# Patient Record
Sex: MALE | Race: WHITE | Employment: UNEMPLOYED | URBAN - NONMETROPOLITAN AREA
[De-identification: names, ages, dates, MRNs, and addresses within clinical notes are randomized per-mention and may not be internally consistent; named-entity substitution may affect disease eponyms.]

---

## 2017-01-02 PROBLEM — G06.2 EPIDURAL ABSCESS: Status: ACTIVE | Noted: 2017-01-02

## 2017-01-02 PROBLEM — F11.90 HEROIN USE: Status: ACTIVE | Noted: 2017-01-02

## 2017-01-02 PROBLEM — Z91.14 NON COMPLIANCE W MEDICATION REGIMEN: Status: ACTIVE | Noted: 2017-01-02

## 2017-01-02 PROBLEM — Z91.148 NON COMPLIANCE W MEDICATION REGIMEN: Status: ACTIVE | Noted: 2017-01-02

## 2017-01-02 PROBLEM — I10 HYPERTENSION: Status: ACTIVE | Noted: 2017-01-02

## 2017-01-02 PROBLEM — E87.1 HYPONATREMIA: Status: ACTIVE | Noted: 2017-01-02

## 2017-01-10 ENCOUNTER — OFFICE VISIT (OUTPATIENT)
Dept: FAMILY MEDICINE CLINIC | Age: 42
End: 2017-01-10

## 2017-01-10 VITALS
HEIGHT: 74 IN | DIASTOLIC BLOOD PRESSURE: 80 MMHG | HEART RATE: 74 BPM | SYSTOLIC BLOOD PRESSURE: 126 MMHG | WEIGHT: 246 LBS | BODY MASS INDEX: 31.57 KG/M2

## 2017-01-10 DIAGNOSIS — L02.212 ABSCESS OF BACK: Primary | ICD-10-CM

## 2017-01-10 PROCEDURE — 99212 OFFICE O/P EST SF 10 MIN: CPT | Performed by: FAMILY MEDICINE

## 2017-01-10 RX ORDER — HYDROCODONE BITARTRATE AND ACETAMINOPHEN 5; 325 MG/1; MG/1
1 TABLET ORAL EVERY 6 HOURS PRN
Qty: 40 TABLET | Refills: 0 | Status: ON HOLD | OUTPATIENT
Start: 2017-01-10 | End: 2017-02-10 | Stop reason: HOSPADM

## 2017-01-10 RX ORDER — HYDROCODONE BITARTRATE AND ACETAMINOPHEN 5; 325 MG/1; MG/1
1 TABLET ORAL EVERY 6 HOURS PRN
Status: ON HOLD | COMMUNITY
End: 2017-02-10 | Stop reason: HOSPADM

## 2017-01-11 ENCOUNTER — TELEPHONE (OUTPATIENT)
Dept: FAMILY MEDICINE CLINIC | Age: 42
End: 2017-01-11

## 2017-02-13 ENCOUNTER — TELEPHONE (OUTPATIENT)
Dept: FAMILY MEDICINE CLINIC | Age: 42
End: 2017-02-13

## 2017-06-27 PROBLEM — M46.20 OSTEOMYELITIS OF SPINE (HCC): Status: ACTIVE | Noted: 2017-06-27

## 2017-06-27 PROBLEM — M46.40 DISKITIS: Status: ACTIVE | Noted: 2017-06-27

## 2017-06-27 PROBLEM — M79.652 PAIN IN LEFT THIGH: Status: ACTIVE | Noted: 2017-06-27

## 2017-07-04 PROBLEM — F11.10 OPIATE ABUSE, CONTINUOUS (HCC): Status: ACTIVE | Noted: 2017-01-02

## 2017-07-05 ENCOUNTER — TELEPHONE (OUTPATIENT)
Dept: FAMILY MEDICINE CLINIC | Age: 42
End: 2017-07-05

## 2017-07-05 PROBLEM — M46.46 DISCITIS OF LUMBAR REGION: Status: ACTIVE | Noted: 2017-06-27
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
## 2017-07-07 PROBLEM — D50.9 MICROCYTIC ANEMIA: Status: ACTIVE | Noted: 2017-07-07

## 2017-07-07 PROBLEM — F19.90 IVDU (INTRAVENOUS DRUG USER): Status: ACTIVE | Noted: 2017-07-07

## 2017-07-07 PROBLEM — R76.8 HEPATITIS C ANTIBODY TEST POSITIVE: Status: ACTIVE | Noted: 2017-07-07
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
## 2017-07-07 PROBLEM — R20.0 RIGHT LEG NUMBNESS: Status: ACTIVE | Noted: 2017-07-07

## 2017-07-28 ENCOUNTER — OFFICE VISIT (OUTPATIENT)
Dept: FAMILY MEDICINE CLINIC | Age: 42
End: 2017-07-28
Payer: MEDICAID

## 2017-07-28 VITALS
SYSTOLIC BLOOD PRESSURE: 130 MMHG | BODY MASS INDEX: 36.57 KG/M2 | WEIGHT: 270 LBS | DIASTOLIC BLOOD PRESSURE: 90 MMHG | TEMPERATURE: 98.6 F | HEART RATE: 86 BPM | HEIGHT: 72 IN

## 2017-07-28 DIAGNOSIS — G89.29 CHRONIC BILATERAL LOW BACK PAIN WITH RIGHT-SIDED SCIATICA: Primary | ICD-10-CM

## 2017-07-28 DIAGNOSIS — M54.41 CHRONIC BILATERAL LOW BACK PAIN WITH RIGHT-SIDED SCIATICA: Primary | ICD-10-CM

## 2017-07-28 DIAGNOSIS — R20.0 RIGHT LEG NUMBNESS: ICD-10-CM

## 2017-07-28 DIAGNOSIS — F11.10 OPIATE ABUSE, CONTINUOUS (HCC): ICD-10-CM

## 2017-07-28 PROCEDURE — 99213 OFFICE O/P EST LOW 20 MIN: CPT | Performed by: FAMILY MEDICINE

## 2017-09-18 ENCOUNTER — HOSPITAL ENCOUNTER (OUTPATIENT)
Dept: GENERAL RADIOLOGY | Age: 42
Discharge: HOME | End: 2017-09-18
Payer: MEDICAID

## 2017-09-18 ENCOUNTER — HOSPITAL ENCOUNTER (OUTPATIENT)
Age: 42
Discharge: HOME | End: 2017-09-18
Payer: MEDICAID

## 2017-09-18 DIAGNOSIS — Z12.5 ENCOUNTER FOR PROSTATE CANCER SCREENING: ICD-10-CM

## 2017-09-18 LAB
EKG ATRIAL RATE: 63 BPM
EKG P AXIS: 44 DEGREES
EKG P-R INTERVAL: 148 MS
EKG Q-T INTERVAL: 406 MS
EKG QRS DURATION: 98 MS
EKG QTC CALCULATION (BAZETT): 415 MS
EKG R AXIS: 66 DEGREES
EKG T AXIS: 39 DEGREES
EKG VENTRICULAR RATE: 63 BPM

## 2017-09-18 PROCEDURE — 71010 XR CHEST LIMITED: CPT

## 2017-09-18 PROCEDURE — 93005 ELECTROCARDIOGRAM TRACING: CPT

## 2017-12-06 ENCOUNTER — APPOINTMENT (OUTPATIENT)
Dept: CT IMAGING | Age: 42
End: 2017-12-06
Payer: MEDICAID

## 2017-12-06 ENCOUNTER — HOSPITAL ENCOUNTER (EMERGENCY)
Age: 42
Discharge: HOME | End: 2017-12-06
Attending: EMERGENCY MEDICINE
Payer: MEDICAID

## 2017-12-06 VITALS
DIASTOLIC BLOOD PRESSURE: 80 MMHG | HEIGHT: 74 IN | SYSTOLIC BLOOD PRESSURE: 123 MMHG | OXYGEN SATURATION: 97 % | BODY MASS INDEX: 30.8 KG/M2 | HEART RATE: 71 BPM | TEMPERATURE: 98.8 F | RESPIRATION RATE: 16 BRPM | WEIGHT: 240 LBS

## 2017-12-06 DIAGNOSIS — G89.29 ACUTE EXACERBATION OF CHRONIC LOW BACK PAIN: Primary | ICD-10-CM

## 2017-12-06 DIAGNOSIS — M54.50 ACUTE EXACERBATION OF CHRONIC LOW BACK PAIN: Primary | ICD-10-CM

## 2017-12-06 DIAGNOSIS — M43.10 ANTEROLISTHESIS: ICD-10-CM

## 2017-12-06 DIAGNOSIS — M54.16 LUMBAR BACK PAIN WITH RADICULOPATHY AFFECTING RIGHT LOWER EXTREMITY: ICD-10-CM

## 2017-12-06 LAB
ANION GAP SERPL CALCULATED.3IONS-SCNC: 15 MEQ/L (ref 8–16)
ANISOCYTOSIS: ABNORMAL
BASOPHILS # BLD: 0.8 %
BASOPHILS ABSOLUTE: 0 THOU/MM3 (ref 0–0.1)
BUN BLDV-MCNC: 9 MG/DL (ref 7–22)
C-REACTIVE PROTEIN: 0.8 MG/DL (ref 0–1)
CALCIUM SERPL-MCNC: 8.8 MG/DL (ref 8.5–10.5)
CHLORIDE BLD-SCNC: 101 MEQ/L (ref 98–111)
CO2: 25 MEQ/L (ref 23–33)
CREAT SERPL-MCNC: 0.8 MG/DL (ref 0.4–1.2)
EOSINOPHIL # BLD: 3 %
EOSINOPHILS ABSOLUTE: 0.2 THOU/MM3 (ref 0–0.4)
GFR SERPL CREATININE-BSD FRML MDRD: > 90 ML/MIN/1.73M2
GLUCOSE BLD-MCNC: 105 MG/DL (ref 70–108)
HCT VFR BLD CALC: 42.4 % (ref 42–52)
HEMOGLOBIN: 13.9 GM/DL (ref 14–18)
HYPOCHROMIA: ABNORMAL
LYMPHOCYTES # BLD: 11.6 %
LYMPHOCYTES ABSOLUTE: 0.7 THOU/MM3 (ref 1–4.8)
MCH RBC QN AUTO: 24.8 PG (ref 27–31)
MCHC RBC AUTO-ENTMCNC: 32.7 GM/DL (ref 33–37)
MCV RBC AUTO: 75.9 FL (ref 80–94)
MICROCYTES: ABNORMAL
MONOCYTES # BLD: 7.6 %
MONOCYTES ABSOLUTE: 0.4 THOU/MM3 (ref 0.4–1.3)
NUCLEATED RED BLOOD CELLS: 0 /100 WBC
OSMOLALITY CALCULATION: 280.3 MOSMOL/KG (ref 275–300)
PDW BLD-RTO: 17.1 % (ref 11.5–14.5)
PLATELET # BLD: 229 THOU/MM3 (ref 130–400)
PMV BLD AUTO: 7.8 MCM (ref 7.4–10.4)
POTASSIUM SERPL-SCNC: 4.6 MEQ/L (ref 3.5–5.2)
RBC # BLD: 5.6 MILL/MM3 (ref 4.7–6.1)
SEG NEUTROPHILS: 77 %
SEGMENTED NEUTROPHILS ABSOLUTE COUNT: 4.5 THOU/MM3 (ref 1.8–7.7)
SODIUM BLD-SCNC: 141 MEQ/L (ref 135–145)
WBC # BLD: 5.8 THOU/MM3 (ref 4.8–10.8)

## 2017-12-06 PROCEDURE — 85025 COMPLETE CBC W/AUTO DIFF WBC: CPT

## 2017-12-06 PROCEDURE — 6360000002 HC RX W HCPCS: Performed by: EMERGENCY MEDICINE

## 2017-12-06 PROCEDURE — 72132 CT LUMBAR SPINE W/DYE: CPT

## 2017-12-06 PROCEDURE — 36415 COLL VENOUS BLD VENIPUNCTURE: CPT

## 2017-12-06 PROCEDURE — 96375 TX/PRO/DX INJ NEW DRUG ADDON: CPT

## 2017-12-06 PROCEDURE — 2580000003 HC RX 258: Performed by: EMERGENCY MEDICINE

## 2017-12-06 PROCEDURE — 86140 C-REACTIVE PROTEIN: CPT

## 2017-12-06 PROCEDURE — 99284 EMERGENCY DEPT VISIT MOD MDM: CPT

## 2017-12-06 PROCEDURE — 6360000004 HC RX CONTRAST MEDICATION: Performed by: EMERGENCY MEDICINE

## 2017-12-06 PROCEDURE — 96374 THER/PROPH/DIAG INJ IV PUSH: CPT

## 2017-12-06 PROCEDURE — 80048 BASIC METABOLIC PNL TOTAL CA: CPT

## 2017-12-06 RX ORDER — TIZANIDINE 4 MG/1
TABLET ORAL
Qty: 24 TABLET | Refills: 0 | Status: SHIPPED | OUTPATIENT
Start: 2017-12-06

## 2017-12-06 RX ORDER — OXYCODONE HYDROCHLORIDE AND ACETAMINOPHEN 5; 325 MG/1; MG/1
1 TABLET ORAL EVERY 6 HOURS PRN
Qty: 15 TABLET | Refills: 0 | Status: SHIPPED | OUTPATIENT
Start: 2017-12-06

## 2017-12-06 RX ORDER — PREDNISONE 10 MG/1
TABLET ORAL
Qty: 16 TABLET | Refills: 0 | Status: SHIPPED | OUTPATIENT
Start: 2017-12-06

## 2017-12-06 RX ORDER — SODIUM CHLORIDE 9 MG/ML
INJECTION, SOLUTION INTRAVENOUS CONTINUOUS
Status: DISCONTINUED | OUTPATIENT
Start: 2017-12-06 | End: 2017-12-06 | Stop reason: HOSPADM

## 2017-12-06 RX ORDER — FENTANYL CITRATE 50 UG/ML
50 INJECTION, SOLUTION INTRAMUSCULAR; INTRAVENOUS ONCE
Status: COMPLETED | OUTPATIENT
Start: 2017-12-06 | End: 2017-12-06

## 2017-12-06 RX ORDER — DEXAMETHASONE SODIUM PHOSPHATE 4 MG/ML
8 INJECTION, SOLUTION INTRA-ARTICULAR; INTRALESIONAL; INTRAMUSCULAR; INTRAVENOUS; SOFT TISSUE ONCE
Status: COMPLETED | OUTPATIENT
Start: 2017-12-06 | End: 2017-12-06

## 2017-12-06 RX ADMIN — DEXAMETHASONE SODIUM PHOSPHATE 8 MG: 4 INJECTION, SOLUTION INTRAMUSCULAR; INTRAVENOUS at 13:42

## 2017-12-06 RX ADMIN — IOPAMIDOL 80 ML: 755 INJECTION, SOLUTION INTRAVENOUS at 13:54

## 2017-12-06 RX ADMIN — FENTANYL CITRATE 50 MCG: 50 INJECTION INTRAMUSCULAR; INTRAVENOUS at 13:36

## 2017-12-06 RX ADMIN — SODIUM CHLORIDE: 9 INJECTION, SOLUTION INTRAVENOUS at 13:38

## 2017-12-06 ASSESSMENT — PAIN DESCRIPTION - LOCATION
LOCATION: BACK

## 2017-12-06 ASSESSMENT — PAIN DESCRIPTION - PAIN TYPE
TYPE: ACUTE PAIN

## 2017-12-06 ASSESSMENT — ENCOUNTER SYMPTOMS
SORE THROAT: 0
BLOOD IN STOOL: 0
EYE REDNESS: 0
ABDOMINAL PAIN: 0
BACK PAIN: 1
EYE DISCHARGE: 0
SHORTNESS OF BREATH: 0
WHEEZING: 0
DIARRHEA: 0
NAUSEA: 0
VOMITING: 0
COUGH: 0
CONSTIPATION: 0
RHINORRHEA: 0

## 2017-12-06 ASSESSMENT — PAIN DESCRIPTION - ORIENTATION
ORIENTATION: LOWER;RIGHT
ORIENTATION: LOWER;RIGHT
ORIENTATION: RIGHT;LOWER

## 2017-12-06 ASSESSMENT — PAIN SCALES - GENERAL
PAINLEVEL_OUTOF10: 8

## 2017-12-06 ASSESSMENT — PAIN DESCRIPTION - DESCRIPTORS
DESCRIPTORS: SHARP

## 2017-12-06 ASSESSMENT — PAIN DESCRIPTION - FREQUENCY
FREQUENCY: CONTINUOUS
FREQUENCY: CONTINUOUS

## 2017-12-06 NOTE — ED TRIAGE NOTES
Patient presents to ER with complaints of lower right back pain. Patient states that he needs a referral to Dr. Edmundo Bishop.

## 2017-12-06 NOTE — ED PROVIDER NOTES
[address] [address]   eMERGENCY dEPARTMENT eNCOUnter        CHIEF COMPLAINT    Chief Complaint   Patient presents with    Back Pain     lower right    Other     needs referral to Dr. Christopher Gilliam       Nurses Notes reviewed and I agree except as noted in the HPI. HPI    Tami Carter is a 40 y.o. male who presents to the ED for evaluation of right-sided lower back pain. Patient states that he has been experiencing the pain for over 1 year. He states that he has a history of IV drug abuse following which he developed an epidural abscess. He reports that he had surgery to remove that abscess on 12/30/16 by Dr. Jimmie Alex. He reports that he then received repeat bilateral laminectomies by Dr. Christopher Gilliam on 2/17/17. Patient reports that he was being followed by Dr. Christopher Gilliam and receiving therapy until he had insurance problems. He states that he then received a lumbar laminectomy, L3-S1 decompression, and had a reema placed by Dr. Tran Read on 6/30/17. Patient reports that his right-sided lower back pain is still present, and that he called Dr. Lora Null office today where he was advised to receive a referral in order to be seen in his office. He rates his pain at an 8/10 in severity, and states that the pain is sharp and aching in quality. He states that the pain is continuous in duration, and that it radiates down his left leg. Patient denies experiencing any chest pain, shortness of breath, abdominal pain, loss of bowel or bladder control, weakness, numbness, or tingling. He states that he has only been taking over the counter medications for the pain. He denies any illicit drug abuse currently. Patient denies further complaints at initial encounter. REVIEW OF SYSTEMS    Review of Systems   Constitutional: Negative for appetite change, chills, diaphoresis, fatigue and fever. HENT: Negative for congestion, ear pain, rhinorrhea and sore throat.     Eyes: Negative for discharge, redness and body mass index is 30.81 kg/m² as calculated from the following:    Height as of this encounter: 6' 2\" (1.88 m). Weight as of this encounter: 240 lb (108.9 kg). Physical Exam   Constitutional: He is oriented to person, place, and time. He appears well-developed and well-nourished. He is active and cooperative. Non-toxic appearance. HENT:   Head: Normocephalic and atraumatic. Right Ear: External ear normal.   Left Ear: External ear normal.   Eyes: Conjunctivae and EOM are normal. Right eye exhibits no discharge. Left eye exhibits no discharge. No scleral icterus. Neck: Normal range of motion. Neck supple. No JVD present. No tracheal deviation present. Cardiovascular: Normal rate, normal heart sounds, intact distal pulses and normal pulses. Exam reveals no gallop. No murmur heard. Pulmonary/Chest: Effort normal and breath sounds normal. No accessory muscle usage or stridor. No respiratory distress. He has no decreased breath sounds. He has no wheezes. He has no rhonchi. He has no rales. Abdominal: Soft. He exhibits no distension. There is no tenderness. There is no rigidity, no rebound and no guarding. Musculoskeletal: Normal range of motion. He exhibits no edema. Right hip: Normal. He exhibits normal range of motion, normal strength, no tenderness, no swelling, no crepitus and no deformity. Thoracic back: Normal. He exhibits normal range of motion, no tenderness, no bony tenderness, no deformity, no spasm and normal pulse. Lumbar back: He exhibits tenderness (right-sided paraspinal). He exhibits normal range of motion, no swelling, no deformity and normal pulse. Neurological: He is alert and oriented to person, place, and time. He is not disoriented. No cranial nerve deficit or sensory deficit. He exhibits normal muscle tone. GCS eye subscore is 4. GCS verbal subscore is 5. GCS motor subscore is 6. Patient's sensation is intact distally in his lower extremities. Skin: Skin is warm and dry. He is not diaphoretic. No erythema. Psychiatric: He has a normal mood and affect. His speech is normal and behavior is normal.   Nursing note and vitals reviewed. MEDICAL DECISION MAKING    DIFFERENTIAL DIAGNOSIS:  Lumbar radiculopathy, chronic back pain      DIAGNOSTIC RESULTS    RADIOLOGY:  I have reviewed radiologic plain film image(s). The plain films will be read or overread by the radiologist.  All other non-plain film images(s) such as CT, Ultrasound and MRI have been read by the radiologist.  [place] [address]   Final Result   Interval change of the further collapse of the L4 endplate and anterior listhesis of L3 on 4. Active discitis or osteomyelitis is not excludable. **This report has been created using voice recognition software. It may contain minor errors which are inherent in voice recognition technology. **      Final report electronically signed by Dr. Rebecca Randall on 12/6/2017 2:30 PM          LABS:   Labs Reviewed   CBC WITH AUTO DIFFERENTIAL - Abnormal; Notable for the following:        Result Value    Hemoglobin 13.9 (*)     MCV 75.9 (*)     MCH 24.8 (*)     MCHC 32.7 (*)     RDW 17.1 (*)     Lymphocytes # 0.7 (*)     All other components within normal limits   BASIC METABOLIC PANEL   C-REACTIVE PROTEIN   ANION GAP   OSMOLALITY   GLOMERULAR FILTRATION RATE, ESTIMATED     All other unresulted laboratory test above are normal:    Vitals:    Vitals:    12/06/17 1054 12/06/17 1129 12/06/17 1253 12/06/17 1344   BP: (!) 137/91 (!) 140/96 135/87 123/80   Pulse:  92 74 71   Resp:  18 16 16   Temp:  98.8 °F (37.1 °C)     TempSrc:  Oral     SpO2:  96% 97% 97%   Weight:       Height:           EMERGENCY DEPARTMENT COURSE:    Medications   Dexamethasone Sodium Phosphate injection 8 mg (8 mg Intravenous Given 12/6/17 1342)   fentaNYL (SUBLIMAZE) injection 50 mcg (50 mcg Intravenous Given 12/6/17 1336)   iopamidol (ISOVUE-370) 76 % injection 80 mL (80 mLs Intravenous Given 12/6/17 9993)       The pt was seen and evaluated by me. Within the department, I observed the pt's vital signs to be within acceptable range except for his blood pressure. Laboratory and Radiological studies were performed, results were reviewed with the patient. Within the department, the pt was treated with sodium chloride, dexamethasone sodium phosphate, and fentanyl. I observed the pt's condition to improve during the duration of their stay. Pain improved to 2/10. I explained my proposed course of treatment to the pt, and they were amenable to my decision. Discussed the case with neurosurgery service as Dr. Paulette Abad who  accepted the case including follow-up. They were discharged home, and they will return to the ED if their symptoms become more severe in nature, or otherwise change. Controlled Substances Monitoring:   Attestation: The Prescription Monitoring Report for this patient was reviewed today. Tavo Kumar MD)  Documentation: Possible medication side effects, risk of tolerance and/or dependence, and alternative treatments discussed., No signs of potential drug abuse or diversion identified. Tavo Kumar MD)    CRITICAL CARE:   None. CONSULTS:  Dr. Alexx Wilde:  None. FINAL IMPRESSION       1. Acute exacerbation of chronic low back pain    2. Lumbar back pain with radiculopathy affecting right lower extremity    3.  Anterolisthesis L3 on L4          DISPOSITION/PLAN  PATIENT REFERRED TO:  Lauri Kelly MD  [facility]  [facility]  [address] [place] [address] [address] [place] [address]    Schedule an appointment as soon as possible for a visit in 2 days      [facility] EM[facility] DEPT  [address]    As needed, If symptoms worsen    DISCHARGE MEDICATIONS:  Discharge Medication List as of 12/6/2017  2:54 PM      START taking these medications    Details   predniSONE (DELTASONE) 10 MG tablet 2 tablets 2 times a day for 2 days then 1 Tablet 2 times a day for 2 days, then 1 tablet daily till gone, Disp-16 tablet, R-0Print      tiZANidine (ZANAFLEX) 4 MG tablet 1 tablet every 6-8 hours for muscle spasm when necessary, Disp-24 tablet, R-0Print      oxyCODONE-acetaminophen (PERCOCET) 5-325 MG per tablet Take 1 tablet by mouth every 6 hours as needed for Pain ., Disp-15 tablet, R-0Print             (Please note that portions of this note were completed with a voice recognition program.  Efforts were made to edit the dictations but occasionally words are mis-transcribed.)      Scribe:  Carrie Garcia 12/08/17 11:38 AM Scribing for and in the presence of Donell Samson M.D. Signed by: Alejandro Abraham, 12/08/17 12:24 PM    Provider:  I personally performed the services described in the documentation, reviewed and edited the documentation which was dictated to the scribe in my presence, and it accurately records my words and actions.      12/08/17 12:24 PM      Mariah Linda MD        Emergency room physician        Mariah Linda MD  12/08/17 [id number]

## 2018-09-02 ENCOUNTER — HOSPITAL ENCOUNTER (EMERGENCY)
Age: 43
Discharge: HOME | End: 2018-09-02
Payer: MEDICAID

## 2018-09-02 VITALS
SYSTOLIC BLOOD PRESSURE: 112 MMHG | OXYGEN SATURATION: 100 % | RESPIRATION RATE: 16 BRPM | DIASTOLIC BLOOD PRESSURE: 95 MMHG | HEART RATE: 99 BPM

## 2018-09-02 DIAGNOSIS — T50.904A DRUG OVERDOSE, UNDETERMINED INTENT, INITIAL ENCOUNTER: Primary | ICD-10-CM

## 2018-09-02 PROCEDURE — 99284 EMERGENCY DEPT VISIT MOD MDM: CPT

## 2018-09-02 NOTE — ED NOTES
Bed: 003A  Expected date: 9/2/18  Expected time: 2:18 PM  Means of arrival: [facility]  Comments:  Possible MARS Lerma, RN  09/02/18 [id number]

## 2018-09-02 NOTE — ED PROVIDER NOTES
2:34 PM The patient was seen in the ED following an overdose. He was alert and orientated on arrival. The patient refused any treatment, workup, or exam. Despite discussion with patient about reasons for and importance of admission and/or further testing, the patient refuses admission and/or any further testing and wants to leave against medical advice. The patient is alert and oriented times three, not altered or intoxicated in any fashion, and appears capable of making informed medical decisions. I explained plainly to the patient and any available family, the possible risks of leaving against medical advice, including worsening of medical condition that could result in their death, disability, or chronic vegetative state. They verbalize understanding to these risks and accept sole responsibility for leaving today. I did explain to them, that although they are leaving against medical advice today, they should not hesitate to return to the emergency room at any time should they change their mind, need re-evaluation or desire further care. (Please note that portions of this note were completed with a voice recognition program.  Efforts were made to edit the dictations but occasionally words are mis-transcribed.)    Scribe:  Paty Montes De Oca 9/2/18 2:35 PM Scribing for and in the presence of Margie Gutierrez CNP    Signed by: Alejandro Hernandez, 09/02/18 2:35 PM    Provider:  I personally performed the services described in the documentation, reviewed and edited the documentation which was dictated to the scribe in my presence, and it accurately records my words and actions.     Sumit Gutierrez CNP 9/2/18 2:35 PM              PRATIBHA Ortiz CNP  09/02/18 [id number]

## 2018-09-02 NOTE — ED NOTES
Pt refusing to do any further testing wants to leave and go home after he gets his ticket.   Pt leaves after signing out AMA>     Isaiah Moore RN  09/02/18 [id number]

## 2024-07-18 ENCOUNTER — APPOINTMENT (OUTPATIENT)
Dept: MRI IMAGING | Age: 49
End: 2024-07-18
Payer: COMMERCIAL

## 2024-07-18 ENCOUNTER — HOSPITAL ENCOUNTER (INPATIENT)
Age: 49
LOS: 6 days | Discharge: NURSING, REHAB OR LONG-TERM CARE | End: 2024-07-24
Attending: STUDENT IN AN ORGANIZED HEALTH CARE EDUCATION/TRAINING PROGRAM | Admitting: STUDENT IN AN ORGANIZED HEALTH CARE EDUCATION/TRAINING PROGRAM
Payer: COMMERCIAL

## 2024-07-18 DIAGNOSIS — M86.10 OTHER ACUTE OSTEOMYELITIS, UNSPECIFIED SITE (HCC): ICD-10-CM

## 2024-07-18 DIAGNOSIS — M54.9 INTRACTABLE BACK PAIN: Primary | ICD-10-CM

## 2024-07-18 PROBLEM — M86.9 OSTEOMYELITIS (HCC): Status: ACTIVE | Noted: 2024-07-18

## 2024-07-18 LAB
ALBUMIN SERPL BCG-MCNC: 3.8 G/DL (ref 3.5–5.1)
ALP SERPL-CCNC: 74 U/L (ref 38–126)
ALT SERPL W/O P-5'-P-CCNC: 9 U/L (ref 11–66)
AMPHETAMINES UR QL SCN: POSITIVE
ANION GAP SERPL CALC-SCNC: 10 MEQ/L (ref 8–16)
AST SERPL-CCNC: 13 U/L (ref 5–40)
BACTERIA: ABNORMAL
BARBITURATES UR QL SCN: NEGATIVE
BASOPHILS ABSOLUTE: 0 THOU/MM3 (ref 0–0.1)
BASOPHILS NFR BLD AUTO: 0.3 %
BENZODIAZ UR QL SCN: POSITIVE
BILIRUB SERPL-MCNC: 0.5 MG/DL (ref 0.3–1.2)
BILIRUB UR QL STRIP: NEGATIVE
BUN SERPL-MCNC: 16 MG/DL (ref 7–22)
BZE UR QL SCN: NEGATIVE
CALCIUM SERPL-MCNC: 8.4 MG/DL (ref 8.5–10.5)
CANNABINOIDS UR QL SCN: NEGATIVE
CASTS #/AREA URNS LPF: ABNORMAL /LPF
CASTS #/AREA URNS LPF: ABNORMAL /LPF
CHARACTER UR: CLEAR
CHARCOAL URNS QL MICRO: ABNORMAL
CHLORIDE SERPL-SCNC: 102 MEQ/L (ref 98–111)
CO2 SERPL-SCNC: 26 MEQ/L (ref 23–33)
COLOR UR: YELLOW
CREAT SERPL-MCNC: 0.7 MG/DL (ref 0.4–1.2)
CRP SERPL-MCNC: 3.95 MG/DL (ref 0–1)
CRYSTALS URNS QL MICRO: ABNORMAL
DEPRECATED RDW RBC AUTO: 43.9 FL (ref 35–45)
EKG ATRIAL RATE: 91 BPM
EKG P AXIS: 39 DEGREES
EKG P-R INTERVAL: 152 MS
EKG Q-T INTERVAL: 350 MS
EKG QRS DURATION: 90 MS
EKG QTC CALCULATION (BAZETT): 430 MS
EKG R AXIS: 83 DEGREES
EKG T AXIS: 61 DEGREES
EKG VENTRICULAR RATE: 91 BPM
EOSINOPHIL NFR BLD AUTO: 0.1 %
EOSINOPHILS ABSOLUTE: 0 THOU/MM3 (ref 0–0.4)
EPITHELIAL CELLS, UA: ABNORMAL /HPF
ERYTHROCYTE [DISTWIDTH] IN BLOOD BY AUTOMATED COUNT: 14.4 % (ref 11.5–14.5)
ERYTHROCYTE [SEDIMENTATION RATE] IN BLOOD BY WESTERGREN METHOD: 12 MM/HR (ref 0–10)
FENTANYL: POSITIVE
GFR SERPL CREATININE-BSD FRML MDRD: > 90 ML/MIN/1.73M2
GLUCOSE SERPL-MCNC: 114 MG/DL (ref 70–108)
GLUCOSE UR QL STRIP.AUTO: NEGATIVE MG/DL
HCT VFR BLD AUTO: 42.1 % (ref 42–52)
HGB BLD-MCNC: 13.1 GM/DL (ref 14–18)
HGB UR QL STRIP.AUTO: NEGATIVE
IMM GRANULOCYTES # BLD AUTO: 0.05 THOU/MM3 (ref 0–0.07)
IMM GRANULOCYTES NFR BLD AUTO: 0.4 %
KETONES UR QL STRIP.AUTO: ABNORMAL
LEUKOCYTE ESTERASE UR QL STRIP.AUTO: NEGATIVE
LYMPHOCYTES ABSOLUTE: 1.6 THOU/MM3 (ref 1–4.8)
LYMPHOCYTES NFR BLD AUTO: 12.9 %
MCH RBC QN AUTO: 26.3 PG (ref 26–33)
MCHC RBC AUTO-ENTMCNC: 31.1 GM/DL (ref 32.2–35.5)
MCV RBC AUTO: 84.5 FL (ref 80–94)
MONOCYTES ABSOLUTE: 1 THOU/MM3 (ref 0.4–1.3)
MONOCYTES NFR BLD AUTO: 8.3 %
NEUTROPHILS ABSOLUTE: 9.6 THOU/MM3 (ref 1.8–7.7)
NEUTROPHILS NFR BLD AUTO: 78 %
NITRITE UR QL STRIP.AUTO: NEGATIVE
NRBC BLD AUTO-RTO: 0 /100 WBC
OPIATES UR QL SCN: NEGATIVE
OXYCODONE: NEGATIVE
PCP UR QL SCN: NEGATIVE
PH UR STRIP.AUTO: 5.5 [PH] (ref 5–9)
PLATELET # BLD AUTO: 224 THOU/MM3 (ref 130–400)
PMV BLD AUTO: 9.7 FL (ref 9.4–12.4)
POTASSIUM SERPL-SCNC: 3.9 MEQ/L (ref 3.5–5.2)
PROT SERPL-MCNC: 6.8 G/DL (ref 6.1–8)
PROT UR STRIP.AUTO-MCNC: ABNORMAL MG/DL
RBC # BLD AUTO: 4.98 MILL/MM3 (ref 4.7–6.1)
RBC #/AREA URNS HPF: ABNORMAL /HPF
RENAL EPI CELLS #/AREA URNS HPF: ABNORMAL /[HPF]
SODIUM SERPL-SCNC: 138 MEQ/L (ref 135–145)
SP GR UR REFRACT.AUTO: > 1.03 (ref 1–1.03)
UROBILINOGEN UR QL STRIP.AUTO: 1 EU/DL (ref 0–1)
WBC # BLD AUTO: 12.3 THOU/MM3 (ref 4.8–10.8)
WBC #/AREA URNS HPF: ABNORMAL /HPF
YEAST LIKE FUNGI URNS QL MICRO: ABNORMAL

## 2024-07-18 PROCEDURE — 93010 ELECTROCARDIOGRAM REPORT: CPT | Performed by: INTERNAL MEDICINE

## 2024-07-18 PROCEDURE — 36415 COLL VENOUS BLD VENIPUNCTURE: CPT

## 2024-07-18 PROCEDURE — 81001 URINALYSIS AUTO W/SCOPE: CPT

## 2024-07-18 PROCEDURE — 96374 THER/PROPH/DIAG INJ IV PUSH: CPT

## 2024-07-18 PROCEDURE — 2580000003 HC RX 258

## 2024-07-18 PROCEDURE — 85025 COMPLETE CBC W/AUTO DIFF WBC: CPT

## 2024-07-18 PROCEDURE — 87040 BLOOD CULTURE FOR BACTERIA: CPT

## 2024-07-18 PROCEDURE — 86140 C-REACTIVE PROTEIN: CPT

## 2024-07-18 PROCEDURE — 1200000000 HC SEMI PRIVATE

## 2024-07-18 PROCEDURE — 80307 DRUG TEST PRSMV CHEM ANLYZR: CPT

## 2024-07-18 PROCEDURE — 72158 MRI LUMBAR SPINE W/O & W/DYE: CPT

## 2024-07-18 PROCEDURE — 93005 ELECTROCARDIOGRAM TRACING: CPT | Performed by: STUDENT IN AN ORGANIZED HEALTH CARE EDUCATION/TRAINING PROGRAM

## 2024-07-18 PROCEDURE — 80053 COMPREHEN METABOLIC PANEL: CPT

## 2024-07-18 PROCEDURE — 6360000002 HC RX W HCPCS

## 2024-07-18 PROCEDURE — A9579 GAD-BASE MR CONTRAST NOS,1ML: HCPCS | Performed by: PHYSICIAN ASSISTANT

## 2024-07-18 PROCEDURE — 99223 1ST HOSP IP/OBS HIGH 75: CPT | Performed by: STUDENT IN AN ORGANIZED HEALTH CARE EDUCATION/TRAINING PROGRAM

## 2024-07-18 PROCEDURE — 6360000002 HC RX W HCPCS: Performed by: PHYSICIAN ASSISTANT

## 2024-07-18 PROCEDURE — 85651 RBC SED RATE NONAUTOMATED: CPT

## 2024-07-18 PROCEDURE — 6360000004 HC RX CONTRAST MEDICATION: Performed by: PHYSICIAN ASSISTANT

## 2024-07-18 PROCEDURE — 6370000000 HC RX 637 (ALT 250 FOR IP)

## 2024-07-18 PROCEDURE — 87641 MR-STAPH DNA AMP PROBE: CPT

## 2024-07-18 PROCEDURE — 99285 EMERGENCY DEPT VISIT HI MDM: CPT

## 2024-07-18 PROCEDURE — 96375 TX/PRO/DX INJ NEW DRUG ADDON: CPT

## 2024-07-18 RX ORDER — ORPHENADRINE CITRATE 30 MG/ML
60 INJECTION INTRAMUSCULAR; INTRAVENOUS ONCE
Status: COMPLETED | OUTPATIENT
Start: 2024-07-18 | End: 2024-07-18

## 2024-07-18 RX ORDER — ACETAMINOPHEN 325 MG/1
650 TABLET ORAL EVERY 6 HOURS PRN
Status: DISCONTINUED | OUTPATIENT
Start: 2024-07-18 | End: 2024-07-24 | Stop reason: HOSPADM

## 2024-07-18 RX ORDER — ACETAMINOPHEN 650 MG/1
650 SUPPOSITORY RECTAL EVERY 6 HOURS PRN
Status: DISCONTINUED | OUTPATIENT
Start: 2024-07-18 | End: 2024-07-24 | Stop reason: HOSPADM

## 2024-07-18 RX ORDER — SODIUM CHLORIDE 9 MG/ML
INJECTION, SOLUTION INTRAVENOUS PRN
Status: DISCONTINUED | OUTPATIENT
Start: 2024-07-18 | End: 2024-07-24 | Stop reason: SDUPTHER

## 2024-07-18 RX ORDER — ENOXAPARIN SODIUM 100 MG/ML
30 INJECTION SUBCUTANEOUS 2 TIMES DAILY
Status: DISCONTINUED | OUTPATIENT
Start: 2024-07-19 | End: 2024-07-24 | Stop reason: HOSPADM

## 2024-07-18 RX ORDER — ONDANSETRON 4 MG/1
4 TABLET, ORALLY DISINTEGRATING ORAL EVERY 8 HOURS PRN
Status: DISCONTINUED | OUTPATIENT
Start: 2024-07-18 | End: 2024-07-24 | Stop reason: HOSPADM

## 2024-07-18 RX ORDER — SODIUM CHLORIDE 0.9 % (FLUSH) 0.9 %
5-40 SYRINGE (ML) INJECTION EVERY 12 HOURS SCHEDULED
Status: DISCONTINUED | OUTPATIENT
Start: 2024-07-18 | End: 2024-07-24 | Stop reason: HOSPADM

## 2024-07-18 RX ORDER — SODIUM CHLORIDE 0.9 % (FLUSH) 0.9 %
5-40 SYRINGE (ML) INJECTION PRN
Status: DISCONTINUED | OUTPATIENT
Start: 2024-07-18 | End: 2024-07-24 | Stop reason: HOSPADM

## 2024-07-18 RX ORDER — LIDOCAINE 4 G/G
1 PATCH TOPICAL DAILY
Status: DISCONTINUED | OUTPATIENT
Start: 2024-07-18 | End: 2024-07-22

## 2024-07-18 RX ORDER — MORPHINE SULFATE 4 MG/ML
4 INJECTION, SOLUTION INTRAMUSCULAR; INTRAVENOUS ONCE
Status: COMPLETED | OUTPATIENT
Start: 2024-07-18 | End: 2024-07-18

## 2024-07-18 RX ORDER — ONDANSETRON 2 MG/ML
4 INJECTION INTRAMUSCULAR; INTRAVENOUS EVERY 6 HOURS PRN
Status: DISCONTINUED | OUTPATIENT
Start: 2024-07-18 | End: 2024-07-24 | Stop reason: HOSPADM

## 2024-07-18 RX ORDER — POTASSIUM CHLORIDE 20 MEQ/1
40 TABLET, EXTENDED RELEASE ORAL PRN
Status: DISCONTINUED | OUTPATIENT
Start: 2024-07-18 | End: 2024-07-24 | Stop reason: HOSPADM

## 2024-07-18 RX ORDER — KETOROLAC TROMETHAMINE 30 MG/ML
30 INJECTION, SOLUTION INTRAMUSCULAR; INTRAVENOUS ONCE
Status: COMPLETED | OUTPATIENT
Start: 2024-07-18 | End: 2024-07-18

## 2024-07-18 RX ORDER — POTASSIUM CHLORIDE 7.45 MG/ML
10 INJECTION INTRAVENOUS PRN
Status: DISCONTINUED | OUTPATIENT
Start: 2024-07-18 | End: 2024-07-24 | Stop reason: HOSPADM

## 2024-07-18 RX ORDER — MORPHINE SULFATE 4 MG/ML
4 INJECTION, SOLUTION INTRAMUSCULAR; INTRAVENOUS
Status: DISCONTINUED | OUTPATIENT
Start: 2024-07-18 | End: 2024-07-22

## 2024-07-18 RX ORDER — MAGNESIUM SULFATE IN WATER 40 MG/ML
2000 INJECTION, SOLUTION INTRAVENOUS PRN
Status: DISCONTINUED | OUTPATIENT
Start: 2024-07-18 | End: 2024-07-24 | Stop reason: HOSPADM

## 2024-07-18 RX ORDER — LORAZEPAM 2 MG/ML
1 INJECTION INTRAMUSCULAR EVERY 6 HOURS PRN
Status: DISCONTINUED | OUTPATIENT
Start: 2024-07-18 | End: 2024-07-24 | Stop reason: HOSPADM

## 2024-07-18 RX ORDER — POLYETHYLENE GLYCOL 3350 17 G/17G
17 POWDER, FOR SOLUTION ORAL DAILY PRN
Status: DISCONTINUED | OUTPATIENT
Start: 2024-07-18 | End: 2024-07-24 | Stop reason: HOSPADM

## 2024-07-18 RX ORDER — MORPHINE SULFATE 2 MG/ML
2 INJECTION, SOLUTION INTRAMUSCULAR; INTRAVENOUS
Status: DISCONTINUED | OUTPATIENT
Start: 2024-07-18 | End: 2024-07-22

## 2024-07-18 RX ORDER — TIZANIDINE 4 MG/1
4 TABLET ORAL EVERY 6 HOURS PRN
Status: DISCONTINUED | OUTPATIENT
Start: 2024-07-18 | End: 2024-07-18

## 2024-07-18 RX ADMIN — MORPHINE SULFATE 4 MG: 4 INJECTION, SOLUTION INTRAMUSCULAR; INTRAVENOUS at 20:15

## 2024-07-18 RX ADMIN — ORPHENADRINE CITRATE 60 MG: 60 INJECTION INTRAMUSCULAR; INTRAVENOUS at 09:31

## 2024-07-18 RX ADMIN — KETOROLAC TROMETHAMINE 30 MG: 30 INJECTION, SOLUTION INTRAMUSCULAR at 09:29

## 2024-07-18 RX ADMIN — MORPHINE SULFATE 4 MG: 4 INJECTION, SOLUTION INTRAMUSCULAR; INTRAVENOUS at 18:08

## 2024-07-18 RX ADMIN — SODIUM CHLORIDE, PRESERVATIVE FREE 10 ML: 5 INJECTION INTRAVENOUS at 21:59

## 2024-07-18 RX ADMIN — VANCOMYCIN HYDROCHLORIDE 2500 MG: 5 INJECTION, POWDER, LYOPHILIZED, FOR SOLUTION INTRAVENOUS at 22:39

## 2024-07-18 RX ADMIN — MORPHINE SULFATE 4 MG: 4 INJECTION, SOLUTION INTRAMUSCULAR; INTRAVENOUS at 15:13

## 2024-07-18 RX ADMIN — LORAZEPAM 1 MG: 2 INJECTION INTRAMUSCULAR; INTRAVENOUS at 20:07

## 2024-07-18 RX ADMIN — GADOTERIDOL 20 ML: 279.3 INJECTION, SOLUTION INTRAVENOUS at 13:01

## 2024-07-18 RX ADMIN — CEFTRIAXONE SODIUM 1000 MG: 1 INJECTION, POWDER, FOR SOLUTION INTRAMUSCULAR; INTRAVENOUS at 21:57

## 2024-07-18 RX ADMIN — ACETAMINOPHEN 650 MG: 325 TABLET ORAL at 23:35

## 2024-07-18 RX ADMIN — MORPHINE SULFATE 4 MG: 4 INJECTION, SOLUTION INTRAMUSCULAR; INTRAVENOUS at 23:22

## 2024-07-18 ASSESSMENT — PAIN SCALES - GENERAL
PAINLEVEL_OUTOF10: 10
PAINLEVEL_OUTOF10: 9
PAINLEVEL_OUTOF10: 10
PAINLEVEL_OUTOF10: 10
PAINLEVEL_OUTOF10: 5
PAINLEVEL_OUTOF10: 10

## 2024-07-18 ASSESSMENT — PAIN DESCRIPTION - ORIENTATION
ORIENTATION: RIGHT;LEFT
ORIENTATION: MID
ORIENTATION: RIGHT
ORIENTATION: LOWER

## 2024-07-18 ASSESSMENT — PAIN - FUNCTIONAL ASSESSMENT
PAIN_FUNCTIONAL_ASSESSMENT: NONE - DENIES PAIN
PAIN_FUNCTIONAL_ASSESSMENT: 0-10
PAIN_FUNCTIONAL_ASSESSMENT: 0-10
PAIN_FUNCTIONAL_ASSESSMENT: PREVENTS OR INTERFERES SOME ACTIVE ACTIVITIES AND ADLS

## 2024-07-18 ASSESSMENT — PAIN DESCRIPTION - DESCRIPTORS
DESCRIPTORS: ACHING

## 2024-07-18 ASSESSMENT — PAIN DESCRIPTION - LOCATION
LOCATION: BACK
LOCATION: BACK;GROIN
LOCATION: BACK

## 2024-07-18 ASSESSMENT — PAIN DESCRIPTION - PAIN TYPE: TYPE: ACUTE PAIN

## 2024-07-18 ASSESSMENT — PAIN DESCRIPTION - FREQUENCY: FREQUENCY: CONTINUOUS

## 2024-07-18 ASSESSMENT — PAIN DESCRIPTION - ONSET: ONSET: ON-GOING

## 2024-07-18 NOTE — ED NOTES
Pt resting in cot. RN reminded pt that we still need a urine sample from him. Pt verbalized understanding. Urinal is at bedside for pt to use when needed.

## 2024-07-18 NOTE — ED NOTES
Pt's sister Rachel called and was given an update on pt's status. Rachel's number is [phone].

## 2024-07-18 NOTE — ED NOTES
Pt resting in cot with eyes closed, respirations even and unlabored. RN informed pt that we still need a urine sample. RN turned on lights and gave pt a urinal to obtain urine sample at this time.

## 2024-07-18 NOTE — ED NOTES
RN medicated pt per MAR and gave pt a lunch box at this time. Pt is resting in cot with eyes closed, respirations even and unlabored. Call light is within reach.

## 2024-07-18 NOTE — ED NOTES
Pt is resting in cot with eyes closed, respirations even and unlabored. No distress noted. Call light is within reach.

## 2024-07-18 NOTE — ED NOTES
ED to inpatient nurses report      Chief Complaint:  Chief Complaint   Patient presents with    Back Pain    Groin Pain     Present to ED from: home    MOA:     LOC: alert and orientated to name, place, date  Mobility: Independent  Oxygen Baseline: room air     Current needs required: room air      Code Status:   Prior    What abnormal results were found and what did you give/do to treat them? NA> pain medications  Any procedures or intervention occur? NA    Mental Status:  Level of Consciousness: Alert (0)    Psych Assessment:        Vitals:  Patient Vitals for the past 24 hrs:   BP Temp Temp src Pulse Resp SpO2 Height Weight   07/18/24 1601 -- -- -- 80 17 97 % -- --   07/18/24 1507 (!) 141/86 -- Oral 83 18 98 % -- --   07/18/24 1309 124/79 -- -- 86 18 96 % -- --   07/18/24 1134 119/65 -- -- 83 18 98 % -- --   07/18/24 1041 (!) 107/48 -- -- 99 15 97 % -- --   07/18/24 0952 (!) 125/109 -- -- 86 18 97 % -- --   07/18/24 0902 137/87 97.6 °F (36.4 °C) Oral 95 18 100 % 1.88 m (6' 2\") 104.3 kg (230 lb)        LDAs:   Peripheral IV 07/18/24 Left (Active)   Site Assessment Clean, dry & intact 07/18/24 1054   Line Status Blood return noted;Flushed 07/18/24 1054   Dressing Status Clean, dry & intact 07/18/24 1054       Ambulatory Status:  No data recorded    Diagnosis:  DISPOSITION Admitted 07/18/2024 04:02:58 PM   Final diagnoses:   Intractable back pain        Consults:  IP CONSULT TO ORTHOPEDIC SURGERY     Pain Score:  Pain Assessment  Pain Assessment: None - Denies Pain  Pain Level: 9  Patient's Stated Pain Goal: 5  Pain Location: Back  Pain Orientation: Right    C-SSRS:   Risk of Suicide: No Risk    Sepsis Screening:       [place] Fall Risk:       Swallow Screening        Preferred Language:   English      ALLERGIES     Patient has no known allergies.    SURGICAL HISTORY       Past Surgical History:   Procedure Laterality Date    BACK SURGERY  02/17/2017    L5-S1 bilateral repeat laminectomies with resection of epidural

## 2024-07-18 NOTE — ED PROVIDER NOTES
[facility] ORTHOPEDICS 7K      EMERGENCY MEDICINE     Pt Name: Jonathan Goldsmith  MRN: [id number]  Birthdate 1975  Date of evaluation: 7/18/2024  Provider: EJ Johnson    CHIEF COMPLAINT       Chief Complaint   Patient presents with    Back Pain    Groin Pain     HISTORY OF PRESENT ILLNESS   Jonathan Goldsmith is a pleasant 49 y.o. male who presents to the emergency department for evaluation of lower back pain and groin pain. He states his back pain is chronic and midline, and that he has a history of back surgeries. His groin pain began a few days ago and he describes it as right-sided, constant, sharp, and radiating down his R testicle and R leg. He notes R leg numbness and tingling but he is still able to feel the tips of his toes. The back and groin pains are not worsened with movement and is not relieved with use of Motrin. He denies recent injury as well as pain in his L leg. He also denies fevers, chills, nausea, headaches, urinary retention, incontinence, hematuria, and hematochezia.    Patient has a history of IV drug abuse as well as history of osteomyelitis of the spine.  Patient had a recent visit to [facility] and had a CT scan of the lumbar spine.    PASTMEDICAL HISTORY     Past Medical History:   Diagnosis Date    Anemia     Discitis of lumbar region 6/27/2017    Epidural abscess     IVDU (intravenous drug user)     Osteomyelitis of spine (HCC)  multilevels  from t11 to l5 6/27/2017       Patient Active Problem List   Diagnosis Code    Epidural abscess  drained G06.2    Opiate abuse, continuous (HCC) F11.10    Hypertension I10    Non compliance w medication regimen  left AMA yesterday from 4A Z91.148    Postoperative pain G89.18    Hyponatremia E87.1    Discitis of lumbar region M46.46    Osteomyelitis of spine (HCC)  multilevels  from t11 to l5 M46.20    Pain in left thigh M79.652    Subcutaneous nodules R22.9    Injection site reaction T80.90XA    Tobacco chew use Z72.0    Right leg numbness R20.0    magnesium sulfate 2000 mg in 50 mL IVPB premix (has no administration in time range)   enoxaparin Sodium (LOVENOX) injection 30 mg (has no administration in time range)   ondansetron (ZOFRAN-ODT) disintegrating tablet 4 mg (has no administration in time range)     Or   ondansetron (ZOFRAN) injection 4 mg (has no administration in time range)   polyethylene glycol (GLYCOLAX) packet 17 g (has no administration in time range)   acetaminophen (TYLENOL) tablet 650 mg (has no administration in time range)     Or   acetaminophen (TYLENOL) suppository 650 mg (has no administration in time range)   cefTRIAXone (ROCEPHIN) 1,000 mg in sodium chloride 0.9 % 50 mL IVPB (mini-bag) (has no administration in time range)   lidocaine 4 % external patch 1 patch (1 patch TransDERmal Patch Applied 7/18/24 2007)   morphine (PF) injection 2 mg ( IntraVENous See Alternative 7/18/24 2015)     Or   morphine (PF) injection 4 mg (4 mg IntraVENous Given 7/18/24 2015)   vancomycin (VANCOCIN) intermittent dosing (placeholder) (has no administration in time range)   vancomycin (VANCOCIN) 2500 mg in sodium chloride 0.9 % 500 mL IVPB (has no administration in time range)     Followed by   vancomycin (VANCOCIN) 1750 mg in sodium chloride 0.9 % 500 mL IVPB (has no administration in time range)   orphenadrine (NORFLEX) injection 60 mg (60 mg IntraMUSCular Given 7/18/24 0931)   ketorolac (TORADOL) injection 30 mg (30 mg IntraMUSCular Given 7/18/24 0929)   gadoteridol (PROHANCE) injection 20 mL (20 mLs IntraVENous Given 7/18/24 1301)   morphine (PF) injection 4 mg (4 mg IntraVENous Given 7/18/24 1513)         PROCEDURES: (None if blank)  Procedures:     CRITICAL CARE: (None if blank)      DISCHARGE PRESCRIPTIONS: (None if blank)  Current Discharge Medication List          FINAL IMPRESSION      1. Intractable back pain          DISPOSITION/PLAN   DISPOSITION Admitted 07/18/2024 04:33:37 PM      OUTPATIENT FOLLOW UP THE PATIENT:  No follow-up provider

## 2024-07-18 NOTE — H&P
History & Physical  Internal Medicine Resident         Patient: Jonathan Goldsmith 49 y.o. male      : 1975  Date of Admission: 2024  Date of Service: Pt seen/examined on 24 and Admitted to Inpatient with expected LOS greater than two midnights due to medical therapy.       ASSESSMENT AND PLAN  Suspected osteomyelitis: Patient has history of lumbar laminectomy of L5-S1 in 2016.  Patient had epidural abscess and discitis in 2017.  Patient had lumbar decompression, I&D in 2017.  Patient has had osteomyelitis of the spine in the past. MRI of the lumbar spine showed abnormal appearance at L2-L3 level.  There is edema and enhancement at this level.  Possible edema extending into the left psoas muscle. Infection at the L2-3 level is also a consideration.  Repeat MRI of the lumbar spine w/wo contrast in 48 hours may be beneficial.  Patient having lumbar back pain radiating down right leg today  Start IV ceftriaxone, and IV vancomycin  Morphine as needed for pain  Blood cultures x 2 ordered  Daily CBC, BMP  MRSA screen ordered  Infectious disease consulted, appreciate recommendations  Hx of IV drug use: Patient has history of IV drug abuse.  States he used drugs 2 days ago  Urine drug screen ordered  Hypertension: Patient has diagnosis of hypertension, denies use of any hypertensive medications.  Had 1 reading of elevated BP at 141/86 in ED today  We will continue to monitor and make changes as necessary    Data reviewed (unless otherwise discussed in assessment/plan)  EKG:  I have reviewed the EKG with the following interpretation: Normal sinus rhythm  Normal ECG  When compared with ECG of 18-SEP-2017 07:43,  No significant change was found  Imaging: I have reviewed MRI lumbar spine with the following interpretation: 1. Abnormal appearance to the L2-L3 level. This is the level above the fusion.  There is edema and enhancement. There is possible edema extending into the left  psoas muscle. The postcontrast

## 2024-07-18 NOTE — ED NOTES
Spoke to Santosh on 7K who approved patient transfer. Patient transported upstairs in stable condition

## 2024-07-18 NOTE — ED TRIAGE NOTES
Pt presents to the ED from home with complaints of back pain and groin pain. Pt states this has been going on for the past 3 days. States he was seen at [facility] and they discharged him. Pt states the pain goes down his right groin area/leg. Pt rates pain a 10/10. States he is an IV drug user.

## 2024-07-19 PROBLEM — M54.9 INTRACTABLE BACK PAIN: Status: ACTIVE | Noted: 2024-07-19

## 2024-07-19 LAB
ANION GAP SERPL CALC-SCNC: 8 MEQ/L (ref 8–16)
BASOPHILS ABSOLUTE: 0 THOU/MM3 (ref 0–0.1)
BASOPHILS NFR BLD AUTO: 0.2 %
BUN SERPL-MCNC: 17 MG/DL (ref 7–22)
CALCIUM SERPL-MCNC: 8.1 MG/DL (ref 8.5–10.5)
CHLORIDE SERPL-SCNC: 100 MEQ/L (ref 98–111)
CO2 SERPL-SCNC: 26 MEQ/L (ref 23–33)
CREAT SERPL-MCNC: 0.7 MG/DL (ref 0.4–1.2)
DEPRECATED RDW RBC AUTO: 44.1 FL (ref 35–45)
EOSINOPHIL NFR BLD AUTO: 0.2 %
EOSINOPHILS ABSOLUTE: 0 THOU/MM3 (ref 0–0.4)
ERYTHROCYTE [DISTWIDTH] IN BLOOD BY AUTOMATED COUNT: 14.2 % (ref 11.5–14.5)
GFR SERPL CREATININE-BSD FRML MDRD: > 90 ML/MIN/1.73M2
GLUCOSE SERPL-MCNC: 87 MG/DL (ref 70–108)
HCT VFR BLD AUTO: 42.4 % (ref 42–52)
HGB BLD-MCNC: 13.4 GM/DL (ref 14–18)
IMM GRANULOCYTES # BLD AUTO: 0.03 THOU/MM3 (ref 0–0.07)
IMM GRANULOCYTES NFR BLD AUTO: 0.2 %
LYMPHOCYTES ABSOLUTE: 2 THOU/MM3 (ref 1–4.8)
LYMPHOCYTES NFR BLD AUTO: 15.9 %
MCH RBC QN AUTO: 27 PG (ref 26–33)
MCHC RBC AUTO-ENTMCNC: 31.6 GM/DL (ref 32.2–35.5)
MCV RBC AUTO: 85.3 FL (ref 80–94)
MONOCYTES ABSOLUTE: 1.3 THOU/MM3 (ref 0.4–1.3)
MONOCYTES NFR BLD AUTO: 10.2 %
MRSA DNA SPEC QL NAA+PROBE: NEGATIVE
NEUTROPHILS ABSOLUTE: 9.2 THOU/MM3 (ref 1.8–7.7)
NEUTROPHILS NFR BLD AUTO: 73.3 %
NRBC BLD AUTO-RTO: 0 /100 WBC
OSMOLALITY SERPL CALC.SUM OF ELEC: 269.1 MOSMOL/KG (ref 275–300)
PLATELET # BLD AUTO: 196 THOU/MM3 (ref 130–400)
PMV BLD AUTO: 9.9 FL (ref 9.4–12.4)
POTASSIUM SERPL-SCNC: 4.1 MEQ/L (ref 3.5–5.2)
RBC # BLD AUTO: 4.97 MILL/MM3 (ref 4.7–6.1)
SODIUM SERPL-SCNC: 134 MEQ/L (ref 135–145)
WBC # BLD AUTO: 12.5 THOU/MM3 (ref 4.8–10.8)

## 2024-07-19 PROCEDURE — 1200000000 HC SEMI PRIVATE

## 2024-07-19 PROCEDURE — 36415 COLL VENOUS BLD VENIPUNCTURE: CPT

## 2024-07-19 PROCEDURE — 6360000002 HC RX W HCPCS

## 2024-07-19 PROCEDURE — 99233 SBSQ HOSP IP/OBS HIGH 50: CPT | Performed by: STUDENT IN AN ORGANIZED HEALTH CARE EDUCATION/TRAINING PROGRAM

## 2024-07-19 PROCEDURE — 80048 BASIC METABOLIC PNL TOTAL CA: CPT

## 2024-07-19 PROCEDURE — 6370000000 HC RX 637 (ALT 250 FOR IP)

## 2024-07-19 PROCEDURE — 85025 COMPLETE CBC W/AUTO DIFF WBC: CPT

## 2024-07-19 PROCEDURE — 2580000003 HC RX 258

## 2024-07-19 RX ORDER — HYDROCODONE BITARTRATE AND ACETAMINOPHEN 5; 325 MG/1; MG/1
1 TABLET ORAL EVERY 6 HOURS PRN
Status: DISCONTINUED | OUTPATIENT
Start: 2024-07-19 | End: 2024-07-20

## 2024-07-19 RX ADMIN — MORPHINE SULFATE 4 MG: 4 INJECTION, SOLUTION INTRAMUSCULAR; INTRAVENOUS at 07:20

## 2024-07-19 RX ADMIN — SODIUM CHLORIDE, PRESERVATIVE FREE 10 ML: 5 INJECTION INTRAVENOUS at 09:42

## 2024-07-19 RX ADMIN — MORPHINE SULFATE 4 MG: 4 INJECTION, SOLUTION INTRAMUSCULAR; INTRAVENOUS at 02:18

## 2024-07-19 RX ADMIN — SODIUM CHLORIDE, PRESERVATIVE FREE 10 ML: 5 INJECTION INTRAVENOUS at 21:35

## 2024-07-19 RX ADMIN — MORPHINE SULFATE 4 MG: 4 INJECTION, SOLUTION INTRAMUSCULAR; INTRAVENOUS at 23:15

## 2024-07-19 RX ADMIN — ACETAMINOPHEN 650 MG: 325 TABLET ORAL at 12:56

## 2024-07-19 RX ADMIN — VANCOMYCIN HYDROCHLORIDE 1750 MG: 5 INJECTION, POWDER, LYOPHILIZED, FOR SOLUTION INTRAVENOUS at 10:35

## 2024-07-19 RX ADMIN — CEFTRIAXONE SODIUM 1000 MG: 1 INJECTION, POWDER, FOR SOLUTION INTRAMUSCULAR; INTRAVENOUS at 09:45

## 2024-07-19 RX ADMIN — VANCOMYCIN HYDROCHLORIDE 1750 MG: 5 INJECTION, POWDER, LYOPHILIZED, FOR SOLUTION INTRAVENOUS at 21:34

## 2024-07-19 RX ADMIN — MORPHINE SULFATE 4 MG: 4 INJECTION, SOLUTION INTRAMUSCULAR; INTRAVENOUS at 09:38

## 2024-07-19 RX ADMIN — MORPHINE SULFATE 4 MG: 4 INJECTION, SOLUTION INTRAMUSCULAR; INTRAVENOUS at 12:56

## 2024-07-19 RX ADMIN — ENOXAPARIN SODIUM 30 MG: 100 INJECTION SUBCUTANEOUS at 09:43

## 2024-07-19 RX ADMIN — MORPHINE SULFATE 4 MG: 4 INJECTION, SOLUTION INTRAMUSCULAR; INTRAVENOUS at 19:18

## 2024-07-19 RX ADMIN — MORPHINE SULFATE 4 MG: 4 INJECTION, SOLUTION INTRAMUSCULAR; INTRAVENOUS at 16:34

## 2024-07-19 RX ADMIN — HYDROCODONE BITARTRATE AND ACETAMINOPHEN 1 TABLET: 5; 325 TABLET ORAL at 20:46

## 2024-07-19 RX ADMIN — CEFTRIAXONE SODIUM 1000 MG: 1 INJECTION, POWDER, FOR SOLUTION INTRAMUSCULAR; INTRAVENOUS at 20:46

## 2024-07-19 ASSESSMENT — PAIN SCALES - GENERAL
PAINLEVEL_OUTOF10: 10
PAINLEVEL_OUTOF10: 8
PAINLEVEL_OUTOF10: 10

## 2024-07-19 ASSESSMENT — PAIN DESCRIPTION - DESCRIPTORS
DESCRIPTORS: ACHING

## 2024-07-19 ASSESSMENT — PAIN DESCRIPTION - LOCATION
LOCATION: BACK

## 2024-07-19 ASSESSMENT — PAIN DESCRIPTION - ORIENTATION
ORIENTATION: MID
ORIENTATION: LOWER

## 2024-07-19 ASSESSMENT — PAIN - FUNCTIONAL ASSESSMENT
PAIN_FUNCTIONAL_ASSESSMENT: PREVENTS OR INTERFERES SOME ACTIVE ACTIVITIES AND ADLS
PAIN_FUNCTIONAL_ASSESSMENT: ACTIVITIES ARE NOT PREVENTED
PAIN_FUNCTIONAL_ASSESSMENT: PREVENTS OR INTERFERES SOME ACTIVE ACTIVITIES AND ADLS
PAIN_FUNCTIONAL_ASSESSMENT: ACTIVITIES ARE NOT PREVENTED

## 2024-07-19 NOTE — PROGRESS NOTES
Hospitalist Progress Note  Internal Medicine Resident      Patient: Jonathan Goldsmith 49 y.o. male      Unit/Bed: K-23/023-A    Admit Date: 7/18/2024      ASSESSMENT AND PLAN  Active Problems  Suspected osteomyelitis: Patient has history of lumbar laminectomy of L5-S1 in 2016.  Patient had epidural abscess and discitis in 2017.  Patient had lumbar decompression, I&D in 2017.  Patient has had osteomyelitis of the spine in the past. MRI of the lumbar spine showed abnormal appearance at L2-L3 level.  There is edema and enhancement at this level.  Possible edema extending into the left psoas muscle. Infection at the L2-3 level is also a consideration.  Repeat MRI of the lumbar spine w/wo contrast in 48 hours may be beneficial.  Patient having lumbar back pain radiating down right leg today.  Patient had 1 fever episode overnight at 101.9, resolved with Tylenol  Continue IV ceftriaxone, and IV vancomycin  Morphine as needed for pain  Tylenol as needed for fevers  Blood cultures x 2 ordered, pending  Daily CBC, BMP  MRSA screen negative  Infectious disease consulted, appreciate recommendations  Hx of IV drug use: Patient has history of IV drug abuse.  States he used drugs 2 days ago  Urine drug screen positive for methamphetamine, fentanyl, benzodiazepines  Hypertension: Patient has diagnosis of hypertension, denies use of any hypertensive medications.  Had 1 reading of elevated BP at 141/86 in ED 7/18/24.  Blood pressure stable today at 133/62  We will continue to monitor and make changes as necessary      LDA: []CVC / []PICC / []Midline / []Marquez / []Drains / []Mediport / []None  Antibiotics: Ceftriaxone, vancomycin  Steroids: None  Labs (still needed?): [x]Yes / []No  IVF (still needed?): [x]Yes / []No    Level of care: []Step Down / [x]Med-Surg  Bed Status: [x]Inpatient / []Observation  Telemetry: []Yes / [x]No  PT/OT: [x]Yes / []No    DVT Prophylaxis: [] Lovenox / [] Heparin / [] SCDs / [] Already on Systemic  provider] enoxaparin  30 mg SubCUTAneous BID    cefTRIAXone (ROCEPHIN) IV  1,000 mg IntraVENous Q12H    lidocaine  1 patch TransDERmal Daily    vancomycin (VANCOCIN) intermittent dosing (placeholder)   Other RX Placeholder    vancomycin  1,750 mg IntraVENous Q12H    PRN Meds: LORazepam, sodium chloride flush, sodium chloride, potassium chloride **OR** potassium alternative oral replacement **OR** potassium chloride, magnesium sulfate, ondansetron **OR** ondansetron, polyethylene glycol, acetaminophen **OR** acetaminophen, morphine **OR** morphine    Exam:  /62   Pulse 95   Temp 99 °F (37.2 °C) (Oral)   Resp 16   Ht 1.88 m (6' 2\")   Wt 104.3 kg (230 lb)   SpO2 100%   BMI 29.53 kg/m²   General: In pain, appears stated age.  Eyes:  PERRL. Conjunctivae/corneas clear.  HENT: Head normal appearing. Nares normal. Oral mucosa moist.  Hearing intact.   Neck: Supple, with full range of motion. Trachea midline.  No gross JVD appreciated.  Respiratory:  Normal effort. Clear to auscultation, without rales or wheezes or rhonchi.  Cardiovascular: Normal rate, regular rhythm with normal S1/S2 without murmurs.    No lower extremity edema.   Abdomen: Soft, non-tender, non-distended with normal bowel sounds.  Musculoskeletal: No joint swelling or tenderness. Normal tone. No abnormal movements.   Skin: Warm and dry. No rashes or lesions.  Neurologic: Pain in bilateral lower extremities, test limited  Psychiatric: Alert and oriented, normal insight and thought content.   Peripheral Pulses: +2 palpable, equal bilaterally.       Labs/Radiology: See chart or assessment above.     Electronically signed by Kyler Dockery DO on 7/19/2024 at 6:30 PM  Case was discussed with Attending, Dr. Glasgow.

## 2024-07-19 NOTE — PLAN OF CARE
Problem: Discharge Planning  Goal: Discharge to home or other facility with appropriate resources  Outcome: Progressing  Flowsheets (Taken 7/19/2024 1709)  Discharge to home or other facility with appropriate resources: Identify barriers to discharge with patient and caregiver     Problem: Pain  Goal: Verbalizes/displays adequate comfort level or baseline comfort level  Outcome: Progressing  Flowsheets (Taken 7/19/2024 1709)  Verbalizes/displays adequate comfort level or baseline comfort level: Encourage patient to monitor pain and request assistance     Problem: Safety - Adult  Goal: Free from fall injury  Outcome: Progressing  Flowsheets (Taken 7/19/2024 1709)  Free From Fall Injury: Instruct family/caregiver on patient safety     Problem: Skin/Tissue Integrity - Adult  Goal: Skin integrity remains intact  Outcome: Progressing  Flowsheets (Taken 7/19/2024 1709)  Skin Integrity Remains Intact: Monitor for areas of redness and/or skin breakdown     Problem: Musculoskeletal - Adult  Goal: Return mobility to safest level of function  Outcome: Progressing  Flowsheets (Taken 7/19/2024 1709)  Return Mobility to Safest Level of Function: Assess patient stability and activity tolerance for standing, transferring and ambulating with or without assistive devices     Problem: Metabolic/Fluid and Electrolytes - Adult  Goal: Electrolytes maintained within normal limits  Outcome: Progressing  Flowsheets (Taken 7/19/2024 1709)  Electrolytes maintained within normal limits: Monitor labs and assess patient for signs and symptoms of electrolyte imbalances     Problem: Hematologic - Adult  Goal: Maintains hematologic stability  Outcome: Progressing  Flowsheets (Taken 7/19/2024 1709)  Maintains hematologic stability: Assess for signs and symptoms of bleeding or hemorrhage   Care plan reviewed with patient.  Patient verbalize understanding of the plan of care and contribute to goal setting.

## 2024-07-19 NOTE — CARE COORDINATION
follow POC.            07/19/24 1444   Service Assessment   Patient Orientation Alert and Oriented   Cognition Alert   History Provided By Patient   Primary Caregiver Self   Accompanied By/Relationship unaccomp.   Support Systems Friends/Neighbors   Patient's Healthcare Decision Maker is: Legal Next of Kin  (sister Rachel)   PCP Verified by CM Yes   Last Visit to PCP Within last two years   Prior Functional Level Independent in ADLs/IADLs   Current Functional Level Assistance with the following:;Bathing;Housework;Shopping;Mobility   Can patient return to prior living arrangement Unknown at present   Ability to make needs known: Good   Family able to assist with home care needs: Yes  (sister can)   Would you like for me to discuss the discharge plan with any other family members/significant others, and if so, who? No   Financial Resources Medicaid   Community Resources None   CM/SW Referral DME   Social/Functional History   Lives With Alone   Type of Home House   Home Layout One level   Active  No   Patient's  Info sister can provide rides, lives in [place]   Discharge Planning   Type of Residence House   Living Arrangements Alone   Current Services Prior To Admission Durable Medical Equipment   Current DME Prior to Arrival Cane;Bedside Commode;Walker  (has RW)   Potential Assistance Needed N/A   DME Ordered? No   Potential Assistance Purchasing Medications No   Type of Home Care Services None   Patient expects to be discharged to: House   Follow Up Appointment: Best Day/Time  Tuesday PM   One/Two Story Residence One story   History of falls? 0   Services At/After Discharge   Transition of Care Consult (CM Consult) Home Health;Discharge Planning   Services At/After Discharge Home Health;PT;OT;Nursing services  (patient unsure at this time, TBD)   Confirm Follow Up Transport Family   Condition of Participation: Discharge Planning   The Plan for Transition of Care is related to the following treatment  goals: better pain control, take a few steps

## 2024-07-19 NOTE — PROGRESS NOTES
[facility]   Pharmacy Pharmacokinetic Monitoring Service - Vancomycin     Jonathan Goldsmith is a 49 y.o. male starting on vancomycin therapy for Bone and Joint Infection. Pharmacy consulted by Dr. Dockery for monitoring and adjustment.    Target Concentration: Goal AUC/-600 mg*hr/L    Additional Antimicrobials: Rocephin    Pertinent Laboratory Values:   Wt Readings from Last 1 Encounters:   07/18/24 104.3 kg (230 lb)     Temp Readings from Last 1 Encounters:   07/18/24 99.5 °F (37.5 °C) (Oral)     Estimated Creatinine Clearance: 166 mL/min (based on SCr of 0.7 mg/dL).  Recent Labs     07/18/24  0940 07/18/24  0941   CREATININE  --  0.7   BUN  --  16   WBC 12.3*  --      Pertinent Cultures:  Date Source Results   7/18/24 Blood Sent   MRSA Nasal Swab: N/A. Non-respiratory infection.    Plan:  Dosing recommendations based on Bayesian software  Start Vancomycin 2500 mg IVPB x 1 loading dose followed by Vancomycin 1750 mg IVPB Q12H  Anticipated AUC of 494 and trough concentration of 12.3 mcg/mL at steady state  Renal labs as indicated   Vancomycin concentration to be ordered within 48 hours   Pharmacy will continue to monitor patient and adjust therapy as indicated    Thank you for the consult,    Haresh Burgess, TrinyD, BCPS  7/18/2024  8:19 PM

## 2024-07-19 NOTE — PROGRESS NOTES
Spoke with sister, she provided HIPAA code. Updated on plan for patient and how patient was doing. No questions or concerns at this time.

## 2024-07-19 NOTE — CONSULTS
CONSULTATION NOTE :ID       Patient - Jonathan Goldsmith,  Age - 49 y.o.    - 1975      Room Number - 7K-23/023-A   MRN -  [id number]   [id number]  Date of Admission -  2024  9:01 AM  Patient's PCP: Lan Davenport MD     Requesting Physician: Adrian Glasgow MD    REASON FOR CONSULTATION   Osteomyelitis  CHIEF COMPLAINT   Back and groin pain    HISTORY OF PRESENT ILLNESS     This is a very pleasant 49 y.o. male who was admitted to the hospital with a chief complaints of back and groin pain for 3 day duration also associated with subjective fever. PMHx HTN, HLD, IV drug use last used about a week ago . Reports 10/10 pain in back and groin starting around 3-4 days ago, with radiation down the right leg. Originally presented to outside facility and was discharged with naproxen, prednisone, flexeril for pain control with minimal effectiveness. Reports surgical history significant for lumbar laminectomy of L5-S1 in 2016 complicated with epidural abscess and discitis in 2017 requiring lumbar decompression and I&D in 2017 at that time tissue biopsy growing Serratia marcescens.     MRI of the spine showed abnormal appearance at L2-L3 level.  There is edema and enhancement at this level.  Possible edema extending into the left psoas muscle.  Infection at the L2-3 level is also a consideration. Moderate severity bilateral foraminal stenosis at the L3-4 level. Recommendations from radiology for repeat imaging 48 hours from presentation. He was started on IV vanc and rocephin for suspected osteomyelitis. Blood cultures x2 pending, MRAS negative.     Overnight fever peaking at 101.6    PAST MEDICAL  HISTORY       Past Medical History:   Diagnosis Date    Anemia     Discitis of lumbar region 2017    Epidural abscess     IVDU (intravenous drug user)     Osteomyelitis of spine (HCC)  multilevels  from t11 to l5 2017       PAST SURGICAL HISTORY     Past Surgical History:

## 2024-07-19 NOTE — PROGRESS NOTES
Pt admitted to  7K23 in a wheelchair.     Complaints: Back pain.     IV site free of s/s of infection or infiltration. Vital signs obtained. Assessment and data collection initiated.     Two nurse skin assessment performed by Marina RN and Reji RN. Oriented to room.     Explained patients right to have family, representative or physician notified of their admission.  Patient has Declined for physician to be notified.  Patient has Declined for family/representative to be notified.    The patient is interested in [facility] meds to beds program?:  No    Policies and procedures for  explained. All questions answered with no further questions at this time. Fall prevention and safety brochure discussed with patient.  Bed alarm on. Call light in reach.

## 2024-07-19 NOTE — PLAN OF CARE
Problem: Discharge Planning  Goal: Discharge to home or other facility with appropriate resources  Outcome: Progressing  Flowsheets (Taken 7/18/2024 2256)  Discharge to home or other facility with appropriate resources:   Identify barriers to discharge with patient and caregiver   Identify discharge learning needs (meds, wound care, etc)     Problem: Pain  Goal: Verbalizes/displays adequate comfort level or baseline comfort level  Outcome: Progressing  Flowsheets (Taken 7/18/2024 2256)  Verbalizes/displays adequate comfort level or baseline comfort level:   Encourage patient to monitor pain and request assistance   Administer analgesics based on type and severity of pain and evaluate response   Consider cultural and social influences on pain and pain management   Assess pain using appropriate pain scale   Implement non-pharmacological measures as appropriate and evaluate response     Problem: Safety - Adult  Goal: Free from fall injury  Outcome: Progressing  Flowsheets (Taken 7/18/2024 2256)  Free From Fall Injury: Instruct family/caregiver on patient safety  Care plan reviewed with patient.  Patient verbalize understanding of the plan of care and contribute to goal setting.

## 2024-07-19 NOTE — PROGRESS NOTES
1552 Pt scheduled for 7-22-24 at 0930. Pt to be NPO at 0500 and hold Lovenox for 24 hours prior to procedure. Call IR at  with any questions.

## 2024-07-20 ENCOUNTER — APPOINTMENT (OUTPATIENT)
Dept: ULTRASOUND IMAGING | Age: 49
End: 2024-07-20
Payer: COMMERCIAL

## 2024-07-20 ENCOUNTER — APPOINTMENT (OUTPATIENT)
Dept: CT IMAGING | Age: 49
End: 2024-07-20
Payer: COMMERCIAL

## 2024-07-20 LAB
ANION GAP SERPL CALC-SCNC: 9 MEQ/L (ref 8–16)
BASOPHILS ABSOLUTE: 0 THOU/MM3 (ref 0–0.1)
BASOPHILS NFR BLD AUTO: 0.3 %
BUN SERPL-MCNC: 15 MG/DL (ref 7–22)
CALCIUM SERPL-MCNC: 8.1 MG/DL (ref 8.5–10.5)
CHLORIDE SERPL-SCNC: 99 MEQ/L (ref 98–111)
CK SERPL-CCNC: 159 U/L (ref 55–170)
CO2 SERPL-SCNC: 26 MEQ/L (ref 23–33)
CREAT SERPL-MCNC: 0.7 MG/DL (ref 0.4–1.2)
DEPRECATED RDW RBC AUTO: 42.1 FL (ref 35–45)
EOSINOPHIL NFR BLD AUTO: 0.5 %
EOSINOPHILS ABSOLUTE: 0.1 THOU/MM3 (ref 0–0.4)
ERYTHROCYTE [DISTWIDTH] IN BLOOD BY AUTOMATED COUNT: 13.8 % (ref 11.5–14.5)
GFR SERPL CREATININE-BSD FRML MDRD: > 90 ML/MIN/1.73M2
GLUCOSE SERPL-MCNC: 94 MG/DL (ref 70–108)
HCT VFR BLD AUTO: 40.3 % (ref 42–52)
HGB BLD-MCNC: 12.9 GM/DL (ref 14–18)
IMM GRANULOCYTES # BLD AUTO: 0.07 THOU/MM3 (ref 0–0.07)
IMM GRANULOCYTES NFR BLD AUTO: 0.5 %
LACTATE SERPL-SCNC: 1.1 MMOL/L (ref 0.5–2)
LYMPHOCYTES ABSOLUTE: 1.7 THOU/MM3 (ref 1–4.8)
LYMPHOCYTES NFR BLD AUTO: 13.3 %
MCH RBC QN AUTO: 26.8 PG (ref 26–33)
MCHC RBC AUTO-ENTMCNC: 32 GM/DL (ref 32.2–35.5)
MCV RBC AUTO: 83.6 FL (ref 80–94)
MONOCYTES ABSOLUTE: 1.3 THOU/MM3 (ref 0.4–1.3)
MONOCYTES NFR BLD AUTO: 10.1 %
NEUTROPHILS ABSOLUTE: 9.9 THOU/MM3 (ref 1.8–7.7)
NEUTROPHILS NFR BLD AUTO: 75.3 %
NRBC BLD AUTO-RTO: 0 /100 WBC
PLATELET # BLD AUTO: 184 THOU/MM3 (ref 130–400)
PMV BLD AUTO: 9.5 FL (ref 9.4–12.4)
POTASSIUM SERPL-SCNC: 4.3 MEQ/L (ref 3.5–5.2)
RBC # BLD AUTO: 4.82 MILL/MM3 (ref 4.7–6.1)
SODIUM SERPL-SCNC: 134 MEQ/L (ref 135–145)
VANCOMYCIN SERPL-MCNC: 11.3 UG/ML (ref 0.1–39.9)
WBC # BLD AUTO: 13.1 THOU/MM3 (ref 4.8–10.8)

## 2024-07-20 PROCEDURE — 2580000003 HC RX 258

## 2024-07-20 PROCEDURE — 36415 COLL VENOUS BLD VENIPUNCTURE: CPT

## 2024-07-20 PROCEDURE — 6370000000 HC RX 637 (ALT 250 FOR IP)

## 2024-07-20 PROCEDURE — 80202 ASSAY OF VANCOMYCIN: CPT

## 2024-07-20 PROCEDURE — 72131 CT LUMBAR SPINE W/O DYE: CPT

## 2024-07-20 PROCEDURE — 82550 ASSAY OF CK (CPK): CPT

## 2024-07-20 PROCEDURE — 6360000002 HC RX W HCPCS

## 2024-07-20 PROCEDURE — 1200000000 HC SEMI PRIVATE

## 2024-07-20 PROCEDURE — 99233 SBSQ HOSP IP/OBS HIGH 50: CPT | Performed by: STUDENT IN AN ORGANIZED HEALTH CARE EDUCATION/TRAINING PROGRAM

## 2024-07-20 PROCEDURE — 83605 ASSAY OF LACTIC ACID: CPT

## 2024-07-20 PROCEDURE — 80048 BASIC METABOLIC PNL TOTAL CA: CPT

## 2024-07-20 PROCEDURE — 6370000000 HC RX 637 (ALT 250 FOR IP): Performed by: PHYSICIAN ASSISTANT

## 2024-07-20 PROCEDURE — 85025 COMPLETE CBC W/AUTO DIFF WBC: CPT

## 2024-07-20 PROCEDURE — 76870 US EXAM SCROTUM: CPT

## 2024-07-20 RX ORDER — GABAPENTIN 100 MG/1
100 CAPSULE ORAL 3 TIMES DAILY
Status: DISCONTINUED | OUTPATIENT
Start: 2024-07-20 | End: 2024-07-22

## 2024-07-20 RX ORDER — HYDROCODONE BITARTRATE AND ACETAMINOPHEN 5; 325 MG/1; MG/1
1 TABLET ORAL EVERY 4 HOURS PRN
Status: DISCONTINUED | OUTPATIENT
Start: 2024-07-20 | End: 2024-07-22

## 2024-07-20 RX ORDER — HYDROCODONE BITARTRATE AND ACETAMINOPHEN 5; 325 MG/1; MG/1
2 TABLET ORAL EVERY 4 HOURS PRN
Status: DISCONTINUED | OUTPATIENT
Start: 2024-07-20 | End: 2024-07-22

## 2024-07-20 RX ORDER — CYCLOBENZAPRINE HCL 10 MG
10 TABLET ORAL 3 TIMES DAILY PRN
Status: DISCONTINUED | OUTPATIENT
Start: 2024-07-20 | End: 2024-07-24 | Stop reason: HOSPADM

## 2024-07-20 RX ADMIN — HYDROCODONE BITARTRATE AND ACETAMINOPHEN 1 TABLET: 5; 325 TABLET ORAL at 01:21

## 2024-07-20 RX ADMIN — Medication 1250 MG: at 23:43

## 2024-07-20 RX ADMIN — GABAPENTIN 100 MG: 100 CAPSULE ORAL at 21:32

## 2024-07-20 RX ADMIN — SODIUM CHLORIDE, PRESERVATIVE FREE 10 ML: 5 INJECTION INTRAVENOUS at 08:07

## 2024-07-20 RX ADMIN — MORPHINE SULFATE 4 MG: 4 INJECTION, SOLUTION INTRAMUSCULAR; INTRAVENOUS at 21:27

## 2024-07-20 RX ADMIN — MORPHINE SULFATE 4 MG: 4 INJECTION, SOLUTION INTRAMUSCULAR; INTRAVENOUS at 08:00

## 2024-07-20 RX ADMIN — Medication 1250 MG: at 11:17

## 2024-07-20 RX ADMIN — CEFTRIAXONE SODIUM 1000 MG: 1 INJECTION, POWDER, FOR SOLUTION INTRAMUSCULAR; INTRAVENOUS at 10:11

## 2024-07-20 RX ADMIN — MORPHINE SULFATE 4 MG: 4 INJECTION, SOLUTION INTRAMUSCULAR; INTRAVENOUS at 03:40

## 2024-07-20 RX ADMIN — HYDROCODONE BITARTRATE AND ACETAMINOPHEN 2 TABLET: 5; 325 TABLET ORAL at 20:02

## 2024-07-20 RX ADMIN — MORPHINE SULFATE 4 MG: 4 INJECTION, SOLUTION INTRAMUSCULAR; INTRAVENOUS at 18:06

## 2024-07-20 RX ADMIN — CYCLOBENZAPRINE 10 MG: 10 TABLET, FILM COATED ORAL at 16:26

## 2024-07-20 RX ADMIN — HYDROCODONE BITARTRATE AND ACETAMINOPHEN 2 TABLET: 5; 325 TABLET ORAL at 13:50

## 2024-07-20 RX ADMIN — MORPHINE SULFATE 4 MG: 4 INJECTION, SOLUTION INTRAMUSCULAR; INTRAVENOUS at 23:44

## 2024-07-20 RX ADMIN — Medication 1250 MG: at 16:25

## 2024-07-20 RX ADMIN — CEFTRIAXONE SODIUM 1000 MG: 1 INJECTION, POWDER, FOR SOLUTION INTRAMUSCULAR; INTRAVENOUS at 21:31

## 2024-07-20 RX ADMIN — HYDROCODONE BITARTRATE AND ACETAMINOPHEN 2 TABLET: 5; 325 TABLET ORAL at 09:24

## 2024-07-20 ASSESSMENT — PAIN DESCRIPTION - ORIENTATION
ORIENTATION: LOWER;RIGHT;MID
ORIENTATION: MID;LOWER
ORIENTATION: RIGHT;MID;LOWER
ORIENTATION: LOWER
ORIENTATION: RIGHT
ORIENTATION: RIGHT;LOWER;MID
ORIENTATION: LOWER
ORIENTATION: RIGHT;MID;LOWER

## 2024-07-20 ASSESSMENT — PAIN DESCRIPTION - DESCRIPTORS
DESCRIPTORS: BURNING
DESCRIPTORS: ACHING
DESCRIPTORS: ACHING
DESCRIPTORS: ACHING;SORE
DESCRIPTORS: ACHING;SHARP

## 2024-07-20 ASSESSMENT — PAIN DESCRIPTION - LOCATION
LOCATION: BACK;LEG;PENIS
LOCATION: BACK
LOCATION: BACK;LEG
LOCATION: BACK
LOCATION: BACK;GROIN;LEG

## 2024-07-20 ASSESSMENT — PAIN SCALES - GENERAL
PAINLEVEL_OUTOF10: 8
PAINLEVEL_OUTOF10: 10
PAINLEVEL_OUTOF10: 10
PAINLEVEL_OUTOF10: 9
PAINLEVEL_OUTOF10: 10
PAINLEVEL_OUTOF10: 10
PAINLEVEL_OUTOF10: 9
PAINLEVEL_OUTOF10: 10
PAINLEVEL_OUTOF10: 8
PAINLEVEL_OUTOF10: 8

## 2024-07-20 ASSESSMENT — PAIN DESCRIPTION - ONSET: ONSET: ON-GOING

## 2024-07-20 ASSESSMENT — PAIN DESCRIPTION - PAIN TYPE: TYPE: ACUTE PAIN

## 2024-07-20 ASSESSMENT — PAIN DESCRIPTION - FREQUENCY: FREQUENCY: CONTINUOUS

## 2024-07-20 NOTE — PROGRESS NOTES
[facility]   Pharmacy Pharmacokinetic Monitoring Service - Vancomycin    Indication: bone and joint infection  Target Concentration: Goal AUC/-600 mg*hr/L  Day of Therapy: 3  Additional Antimicrobials: ceftriaxone    Pertinent Laboratory Values:   Wt Readings from Last 1 Encounters:   07/18/24 104.3 kg (230 lb)     Temp Readings from Last 1 Encounters:   07/20/24 98.4 °F (36.9 °C) (Oral)     Estimated Creatinine Clearance: 166 mL/min (based on SCr of 0.7 mg/dL).  Recent Labs     07/19/24  0605 07/20/24  0409   CREATININE 0.7 0.7   BUN 17 15   WBC 12.5* 13.1*     Pertinent Cultures:  Date Source Results   07/18/24 Blood x 2 ngtd   MRSA Nasal Swab: N/A. Non-respiratory infection.    Recent vancomycin administrations                     vancomycin (VANCOCIN) 1750 mg in sodium chloride 0.9 % 500 mL IVPB (mg) 1,750 mg New Bag 07/19/24 2134     1,750 mg New Bag  1035    vancomycin (VANCOCIN) 2500 mg in sodium chloride 0.9 % 500 mL IVPB (mg) 2,500 mg New Bag 07/18/24 2239                    Assessment:  Date/Time Current Dose Concentration Timing of Concentration (h) AUC C trough,ss   07/20/24 1750 mg IV Q12H 11.3 6 h 35 min 354 7.8   Note: Serum concentrations collected for AUC dosing may appear elevated if collected in close proximity to the dose administered, this is not necessarily an indication of toxicity    Plan:  Current dosing regimen is sub-therapeutic  Increase dose to 1500 mg IV Q8H for a predicted AUC of 446 and trough of 12.9  Repeat vancomycin concentration ordered for tomorrow AM  Pharmacy will continue to monitor patient and adjust therapy as indicated    Thank you for the consult,    Janet Dominique, PharmD, BCPS  7/20/2024  7:15 AM

## 2024-07-20 NOTE — PROGRESS NOTES
Department of Orthopedic Surgery  Spine Service  Attending Progress Note        Subjective:  C/o back pain that radiates into the right groin/testicle and down the posterolateral leg into the foot. Denies left leg symptoms. Denies fever, chills, chest pain, shortness of breath, n/v. Denies bowel or bladder incontinence.  WBC 13.1. Blood cultures pending. Afebrile    Vitals  VITALS:  /72   Pulse 88   Temp 98.5 °F (36.9 °C) (Oral)   Resp 20   Ht 1.88 m (6' 2\")   Wt 104.3 kg (230 lb)   SpO2 98%   BMI 29.53 kg/m²   24HR INTAKE/OUTPUT:    Intake/Output Summary (Last 24 hours) at 7/20/2024 1118  Last data filed at 7/20/2024 0738  Gross per 24 hour   Intake 840 ml   Output 930 ml   Net -90 ml     URINARY CATHETER OUTPUT (Marquez):     DRAIN/TUBE OUTPUT:         PHYSICAL EXAM:    Orientation:  alert and oriented to person, place and time    Lower Extremity Motor :  quadriceps, extensor hallucis longus, dorsiflexion, plantarflexion 5/5 bilaterally - patient able to somewhat lift right leg off bed, but c/o back pain  Lower Extremity Sensory:  Intact L1-S1    LABS:    HgB:    Lab Results   Component Value Date/Time    HGB 12.9 07/20/2024 04:09 AM     CBC with Differential:    Lab Results   Component Value Date/Time    WBC 13.1 07/20/2024 04:09 AM    RBC 4.82 07/20/2024 04:09 AM    HGB 12.9 07/20/2024 04:09 AM    HCT 40.3 07/20/2024 04:09 AM     07/20/2024 04:09 AM    MCV 83.6 07/20/2024 04:09 AM    MCH 26.8 07/20/2024 04:09 AM    MCHC 32.0 07/20/2024 04:09 AM    RDW 17.1 12/06/2017 11:33 AM    NRBC 0 07/20/2024 04:09 AM    LYMPHOPCT 23.4 05/30/2017 05:42 PM    MONOPCT 10.1 07/20/2024 04:09 AM    EOSPCT 1.8 05/30/2017 05:42 PM    BASOPCT 0.7 05/30/2017 05:42 PM    MONOSABS 1.3 07/20/2024 04:09 AM    LYMPHSABS 1.7 07/20/2024 04:09 AM    EOSABS 0.1 07/20/2024 04:09 AM    BASOSABS 0.0 07/20/2024 04:09 AM     BMP:    Lab Results   Component Value Date/Time     07/20/2024 04:09 AM    K 4.3 07/20/2024 04:09 AM     CL 99 07/20/2024 04:09 AM    CO2 26 07/20/2024 04:09 AM    BUN 15 07/20/2024 04:09 AM    CREATININE 0.7 07/20/2024 04:09 AM    CALCIUM 8.1 07/20/2024 04:09 AM    LABGLOM > 90 07/20/2024 04:09 AM    LABGLOM >90 12/06/2017 11:33 AM    GLUCOSE 94 07/20/2024 04:09 AM    GLUCOSE 95 05/30/2017 05:42 PM       ASSESSMENT AND PLAN:    1) Prior L3-S1 posterior spinal fusion for osteomyelitis/epidural abscess in 2017  2) ? L2-3 discitis  3) Low back pain  4) IVDA    1:  Monitor labs  2:  Activity Level:  As tolerated  3:  Pain Control:  Poor  4:  Discharge Planning:  Pending  5:  Antibiotics per ID  6:  IR consult L2-3 disc space biopsy   7:  No acute surgical plans at this time  8:  Consider repeat imaging of MRI given patient moving due to pain, pain not currently controlled, do not think it would be beneficial at this time    EJ Mccracken

## 2024-07-20 NOTE — PLAN OF CARE
Problem: Discharge Planning  Goal: Discharge to home or other facility with appropriate resources  7/20/2024 0010 by María Gutierrez RN  Outcome: Progressing  Flowsheets (Taken 7/20/2024 0010)  Discharge to home or other facility with appropriate resources:   Identify barriers to discharge with patient and caregiver   Arrange for needed discharge resources and transportation as appropriate   Identify discharge learning needs (meds, wound care, etc)     Problem: Pain  Goal: Verbalizes/displays adequate comfort level or baseline comfort level  7/20/2024 0010 by María Gutierrez RN  Outcome: Progressing  Flowsheets (Taken 7/20/2024 0010)  Verbalizes/displays adequate comfort level or baseline comfort level:   Encourage patient to monitor pain and request assistance   Assess pain using appropriate pain scale   Administer analgesics based on type and severity of pain and evaluate response   Implement non-pharmacological measures as appropriate and evaluate response     Problem: Safety - Adult  Goal: Free from fall injury  7/20/2024 0010 by María Gutierrez RN  Outcome: Progressing  Flowsheets (Taken 7/20/2024 0010)  Free From Fall Injury: Instruct family/caregiver on patient safety     Problem: Skin/Tissue Integrity - Adult  Goal: Skin integrity remains intact  7/20/2024 0010 by María Gutierrez RN  Outcome: Progressing  Flowsheets  Taken 7/20/2024 0010 by María Gutierrez RN  Skin Integrity Remains Intact: Monitor for areas of redness and/or skin breakdown  Taken 7/19/2024 1710 by Erendira De Leon RN  Skin Integrity Remains Intact: Monitor for areas of redness and/or skin breakdown     Problem: Musculoskeletal - Adult  Goal: Return mobility to safest level of function  7/20/2024 0010 by María Gutierrez RN  Outcome: Progressing  Flowsheets (Taken 7/20/2024 0010)  Return Mobility to Safest Level of Function:   Assess patient stability and activity tolerance for standing, transferring and ambulating with or without assistive

## 2024-07-20 NOTE — CONSULTS
into the left psoas muscle. The postcontrast images are degraded by motion. This could represent degenerative marrow changes and a strain of the left psoas muscle. Infection at the L2-3 level is also a consideration. There are no gross abnormalities in the spinal canal. A repeat MRI of the lumbar spine without and with contrast in 48 hours may be beneficial if the patient would be able to have his pain controlled at that time. 2. Moderate severity bilateral foraminal stenosis at the L3-4 level **This report has been created using voice recognition software. It may contain minor errors which are inherent in voice recognition technology.** Electronically signed by Dr. Scarlet Linder    CT LUMBAR SPINE WO CONTRAST    Result Date: 7/17/2024  CT LUMBAR SPINE WITHOUT CONTRAST RPID: CT LUMBAR SPINE WITHOUT IV CONTRAST CLINICAL INDICATION: 49 years old Male with a history of  Low back pain, cauda equina syndrome suspected; low back pain COMPARISON: None TECHNIQUE: Contiguous axial CT images were obtained of the lumbar spine without IV contrast.  From this data, sagittal and coronal reformatted images were generated. RADIATION DOSE REDUCTION: This exam was performed according to our departmental dose-optimization program, which includes automated exposure control, adjustment of the mA and kV according to patient size, and iterative reconstruction technique if available. FINDINGS: Posterior rods and pedicle screws transfixing L3-S1. The fusion hardware appears intact without evidence of lucency around the screws. The L4 pedicle screws closely approximate the superior endplate of L4. Interbody fusion cages at L5-S1. Laminectomy changes are noted at L3-L4 and 5. L2-L3 disc space narrowing with 6 mm retrolisthesis of L2 on L3. Advanced facet arthropathy at this level with widening of the facet joints. Mild spondylosis in the upper lumbar spine. Clips from cholecystectomy. Granulomas in the spleen, which appears enlarged although  only partially visualized. No retroperitoneal abnormalities are seen.    IMPRESSION: 1. Fusion of L3-S1 with posterior rods and pedicle screws. Laminectomies at L3, L4 and L5 were performed. 2. Retrolisthesis of L2 on L3 6 mm with advanced facet arthropathy noted. Overall alignment is unchanged relative to a abdominal CT 11/25/2023. 3. No levels of high-grade upper lumbar central canal stenosis are seen. Electronically signed by:  Danny Gilmore MD  07/17/2024 12:32 AM EDT  Workstation: [id number]

## 2024-07-20 NOTE — PROGRESS NOTES
Progress note: Infectious diseases    Patient - Jonathan Goldsmith,  Age - 49 y.o.    - 1975      Room Number - 7K-23/023-A   MRN -  [id number]   [facility] # - [id number]  Date of Admission -  2024  9:01 AM    SUBJECTIVE:   He has lower back pain  No fever or chills, no bowel or urinary incontinence  OBJECTIVE   VITALS    height is 1.88 m (6' 2\") and weight is 104.3 kg (230 lb). His oral temperature is 98.5 °F (36.9 °C). His blood pressure is 133/72 and his pulse is 88. His respiration is 20 and oxygen saturation is 98%.       Wt Readings from Last 3 Encounters:   24 104.3 kg (230 lb)   24 104.3 kg (230 lb)   17 108.9 kg (240 lb)       I/O (24 Hours)    Intake/Output Summary (Last 24 hours) at 2024 1025  Last data filed at 2024 0738  Gross per 24 hour   Intake 1140 ml   Output 930 ml   Net 210 ml       General Appearance  Awake, alert, oriented,  not  In acute distress  HEENT - normocephalic, atraumatic, pink conjunctiva,  anicteric sclera  Neck - Supple, no mass  Lungs -  Bilateral good air entry, no rhonchi, no wheeze  Cardiovascular - Heart sounds are normal.     Abdomen - soft, not distended, nontender,   Neurologic -oriented  Skin - No bruising or bleeding  Extremities - No edema, no cyanosis, clubbing     MEDICATIONS:      vancomycin  1,250 mg IntraVENous Q8H    sodium chloride flush  5-40 mL IntraVENous 2 times per day    [Held by provider] enoxaparin  30 mg SubCUTAneous BID    cefTRIAXone (ROCEPHIN) IV  1,000 mg IntraVENous Q12H    lidocaine  1 patch TransDERmal Daily    vancomycin (VANCOCIN) intermittent dosing (placeholder)   Other RX Placeholder      sodium chloride       HYDROcodone 5 mg - acetaminophen **OR** HYDROcodone 5 mg - acetaminophen, LORazepam, sodium chloride flush, sodium chloride, potassium chloride **OR** potassium alternative oral replacement **OR** potassium chloride,

## 2024-07-20 NOTE — PROGRESS NOTES
Possible edema extending into the left psoas muscle.  Infection at the L2-3 level is also a consideration.  Repeat MRI of the lumbar spine w/wo contrast in 48 hours may be beneficial.  Vitals in ED were RR 18, HR 83, /86.  Patient was given Toradol, morphine, Norflex in the ED.\"    Medications:    Infusion Medications    sodium chloride      Scheduled Medications    vancomycin  1,250 mg IntraVENous Q8H    sodium chloride flush  5-40 mL IntraVENous 2 times per day    [Held by provider] enoxaparin  30 mg SubCUTAneous BID    cefTRIAXone (ROCEPHIN) IV  1,000 mg IntraVENous Q12H    lidocaine  1 patch TransDERmal Daily    vancomycin (VANCOCIN) intermittent dosing (placeholder)   Other RX Placeholder    PRN Meds: HYDROcodone 5 mg - acetaminophen **OR** HYDROcodone 5 mg - acetaminophen, cyclobenzaprine, LORazepam, sodium chloride flush, sodium chloride, potassium chloride **OR** potassium alternative oral replacement **OR** potassium chloride, magnesium sulfate, ondansetron **OR** ondansetron, polyethylene glycol, acetaminophen **OR** acetaminophen, morphine **OR** morphine    Exam:  /71   Pulse 82   Temp 98.5 °F (36.9 °C) (Oral)   Resp 18   Ht 1.88 m (6' 2\")   Wt 104.3 kg (230 lb)   SpO2 98%   BMI 29.53 kg/m²   General: In pain, appears stated age.  Restless, acutely ill-appearing  Eyes:  PERRL. Conjunctivae/corneas clear.  HENT: Head normal appearing. Nares normal. Oral mucosa moist.  Hearing intact.   Neck: Supple, with full range of motion. Trachea midline.  No gross JVD appreciated.  Respiratory:  Normal effort. Clear to auscultation, without rales or wheezes or rhonchi.  Cardiovascular: Normal rate, regular rhythm with normal S1/S2 without murmurs.    No lower extremity edema.   Abdomen: Soft, non-tender, non-distended with normal bowel sounds.  Musculoskeletal: No joint swelling or tenderness. Normal tone. No abnormal movements.   Skin: Warm and dry. No rashes or lesions.  Neurologic: Pain in

## 2024-07-21 LAB
ANION GAP SERPL CALC-SCNC: 10 MEQ/L (ref 8–16)
BASOPHILS ABSOLUTE: 0 THOU/MM3 (ref 0–0.1)
BASOPHILS NFR BLD AUTO: 0.2 %
BUN SERPL-MCNC: 15 MG/DL (ref 7–22)
CALCIUM SERPL-MCNC: 8.4 MG/DL (ref 8.5–10.5)
CHLORIDE SERPL-SCNC: 96 MEQ/L (ref 98–111)
CO2 SERPL-SCNC: 28 MEQ/L (ref 23–33)
CREAT SERPL-MCNC: 0.9 MG/DL (ref 0.4–1.2)
DEPRECATED RDW RBC AUTO: 41.1 FL (ref 35–45)
EOSINOPHIL NFR BLD AUTO: 3.2 %
EOSINOPHILS ABSOLUTE: 0.3 THOU/MM3 (ref 0–0.4)
ERYTHROCYTE [DISTWIDTH] IN BLOOD BY AUTOMATED COUNT: 13.5 % (ref 11.5–14.5)
GFR SERPL CREATININE-BSD FRML MDRD: > 90 ML/MIN/1.73M2
GLUCOSE SERPL-MCNC: 95 MG/DL (ref 70–108)
HCT VFR BLD AUTO: 39.5 % (ref 42–52)
HGB BLD-MCNC: 12.8 GM/DL (ref 14–18)
IMM GRANULOCYTES # BLD AUTO: 0.04 THOU/MM3 (ref 0–0.07)
IMM GRANULOCYTES NFR BLD AUTO: 0.4 %
LYMPHOCYTES ABSOLUTE: 1.2 THOU/MM3 (ref 1–4.8)
LYMPHOCYTES NFR BLD AUTO: 11.9 %
MCH RBC QN AUTO: 26.9 PG (ref 26–33)
MCHC RBC AUTO-ENTMCNC: 32.4 GM/DL (ref 32.2–35.5)
MCV RBC AUTO: 83 FL (ref 80–94)
MONOCYTES ABSOLUTE: 1.2 THOU/MM3 (ref 0.4–1.3)
MONOCYTES NFR BLD AUTO: 11.9 %
NEUTROPHILS ABSOLUTE: 7.4 THOU/MM3 (ref 1.8–7.7)
NEUTROPHILS NFR BLD AUTO: 72.4 %
NRBC BLD AUTO-RTO: 0 /100 WBC
PLATELET # BLD AUTO: 195 THOU/MM3 (ref 130–400)
PMV BLD AUTO: 9.6 FL (ref 9.4–12.4)
POTASSIUM SERPL-SCNC: 4.8 MEQ/L (ref 3.5–5.2)
RBC # BLD AUTO: 4.76 MILL/MM3 (ref 4.7–6.1)
SODIUM SERPL-SCNC: 134 MEQ/L (ref 135–145)
VANCOMYCIN SERPL-MCNC: 16.9 UG/ML (ref 0.1–39.9)
WBC # BLD AUTO: 10.2 THOU/MM3 (ref 4.8–10.8)

## 2024-07-21 PROCEDURE — 80048 BASIC METABOLIC PNL TOTAL CA: CPT

## 2024-07-21 PROCEDURE — 1200000000 HC SEMI PRIVATE

## 2024-07-21 PROCEDURE — 6370000000 HC RX 637 (ALT 250 FOR IP)

## 2024-07-21 PROCEDURE — 36415 COLL VENOUS BLD VENIPUNCTURE: CPT

## 2024-07-21 PROCEDURE — 51798 US URINE CAPACITY MEASURE: CPT

## 2024-07-21 PROCEDURE — 2580000003 HC RX 258

## 2024-07-21 PROCEDURE — 85025 COMPLETE CBC W/AUTO DIFF WBC: CPT

## 2024-07-21 PROCEDURE — 6360000002 HC RX W HCPCS

## 2024-07-21 PROCEDURE — 99233 SBSQ HOSP IP/OBS HIGH 50: CPT | Performed by: STUDENT IN AN ORGANIZED HEALTH CARE EDUCATION/TRAINING PROGRAM

## 2024-07-21 PROCEDURE — 80202 ASSAY OF VANCOMYCIN: CPT

## 2024-07-21 PROCEDURE — 6370000000 HC RX 637 (ALT 250 FOR IP): Performed by: PHYSICIAN ASSISTANT

## 2024-07-21 RX ADMIN — CEFTRIAXONE SODIUM 1000 MG: 1 INJECTION, POWDER, FOR SOLUTION INTRAMUSCULAR; INTRAVENOUS at 20:29

## 2024-07-21 RX ADMIN — MORPHINE SULFATE 4 MG: 4 INJECTION, SOLUTION INTRAMUSCULAR; INTRAVENOUS at 04:56

## 2024-07-21 RX ADMIN — CYCLOBENZAPRINE 10 MG: 10 TABLET, FILM COATED ORAL at 10:43

## 2024-07-21 RX ADMIN — HYDROCODONE BITARTRATE AND ACETAMINOPHEN 2 TABLET: 5; 325 TABLET ORAL at 16:12

## 2024-07-21 RX ADMIN — HYDROCODONE BITARTRATE AND ACETAMINOPHEN 2 TABLET: 5; 325 TABLET ORAL at 06:49

## 2024-07-21 RX ADMIN — GABAPENTIN 100 MG: 100 CAPSULE ORAL at 08:58

## 2024-07-21 RX ADMIN — CYCLOBENZAPRINE 10 MG: 10 TABLET, FILM COATED ORAL at 02:15

## 2024-07-21 RX ADMIN — HYDROCODONE BITARTRATE AND ACETAMINOPHEN 2 TABLET: 5; 325 TABLET ORAL at 20:24

## 2024-07-21 RX ADMIN — Medication 1250 MG: at 08:57

## 2024-07-21 RX ADMIN — CEFTRIAXONE SODIUM 1000 MG: 1 INJECTION, POWDER, FOR SOLUTION INTRAMUSCULAR; INTRAVENOUS at 10:40

## 2024-07-21 RX ADMIN — GABAPENTIN 100 MG: 100 CAPSULE ORAL at 20:25

## 2024-07-21 RX ADMIN — MORPHINE SULFATE 4 MG: 4 INJECTION, SOLUTION INTRAMUSCULAR; INTRAVENOUS at 14:49

## 2024-07-21 RX ADMIN — HYDROCODONE BITARTRATE AND ACETAMINOPHEN 2 TABLET: 5; 325 TABLET ORAL at 02:15

## 2024-07-21 RX ADMIN — GABAPENTIN 100 MG: 100 CAPSULE ORAL at 14:49

## 2024-07-21 RX ADMIN — HYDROCODONE BITARTRATE AND ACETAMINOPHEN 2 TABLET: 5; 325 TABLET ORAL at 10:38

## 2024-07-21 RX ADMIN — Medication 1250 MG: at 16:14

## 2024-07-21 RX ADMIN — MORPHINE SULFATE 4 MG: 4 INJECTION, SOLUTION INTRAMUSCULAR; INTRAVENOUS at 09:00

## 2024-07-21 ASSESSMENT — PAIN SCALES - GENERAL
PAINLEVEL_OUTOF10: 9
PAINLEVEL_OUTOF10: 8
PAINLEVEL_OUTOF10: 8
PAINLEVEL_OUTOF10: 9
PAINLEVEL_OUTOF10: 10
PAINLEVEL_OUTOF10: 8
PAINLEVEL_OUTOF10: 9
PAINLEVEL_OUTOF10: 7
PAINLEVEL_OUTOF10: 9
PAINLEVEL_OUTOF10: 10
PAINLEVEL_OUTOF10: 7
PAINLEVEL_OUTOF10: 8
PAINLEVEL_OUTOF10: 9
PAINLEVEL_OUTOF10: 8

## 2024-07-21 ASSESSMENT — PAIN DESCRIPTION - FREQUENCY
FREQUENCY: CONTINUOUS
FREQUENCY: CONTINUOUS

## 2024-07-21 ASSESSMENT — PAIN DESCRIPTION - ORIENTATION
ORIENTATION: LOWER;MID
ORIENTATION: LOWER;MID
ORIENTATION: RIGHT
ORIENTATION: RIGHT
ORIENTATION: LOWER;MID
ORIENTATION: LOWER
ORIENTATION: LOWER;MID

## 2024-07-21 ASSESSMENT — PAIN DESCRIPTION - DESCRIPTORS
DESCRIPTORS: ACHING;SPASM;SHARP
DESCRIPTORS: SHARP
DESCRIPTORS: ACHING;SHARP
DESCRIPTORS: ACHING;SHARP
DESCRIPTORS: SHARP
DESCRIPTORS: ACHING;SHARP
DESCRIPTORS: DISCOMFORT;ACHING

## 2024-07-21 ASSESSMENT — PAIN DESCRIPTION - DIRECTION: RADIATING_TOWARDS: DOWN RIGHT LEG

## 2024-07-21 ASSESSMENT — PAIN DESCRIPTION - ONSET: ONSET: ON-GOING

## 2024-07-21 ASSESSMENT — PAIN DESCRIPTION - LOCATION
LOCATION: BACK;LEG
LOCATION: BACK
LOCATION: BACK
LOCATION: BACK;LEG;HIP
LOCATION: BACK
LOCATION: BACK

## 2024-07-21 ASSESSMENT — PAIN DESCRIPTION - PAIN TYPE: TYPE: ACUTE PAIN

## 2024-07-21 NOTE — PROGRESS NOTES
Hospitalist Progress Note  Internal Medicine Resident      Patient: Jonathan Goldsmith 49 y.o. male      Unit/Bed: K-23/023-A    Admit Date: 7/18/2024      ASSESSMENT AND PLAN  Active Problems  Suspected osteomyelitis: Patient has history of lumbar laminectomy of L5-S1 in 2016.  Patient had epidural abscess and discitis in 2017.  Patient had lumbar decompression, I&D in 2017.  Patient has had osteomyelitis of the spine in the past. MRI of the lumbar spine showed abnormal appearance at L2-L3 level.  There is edema and enhancement at this level.  Possible edema extending into the left psoas muscle. Infection at the L2-3 level is also a consideration.  Repeat MRI of the lumbar spine w/wo contrast in 48 hours may be beneficial.  Patient having lumbar back pain radiating down right leg today.  Patient had 1 fever episode overnight at 101.9, resolved with Tylenol  Continue IV ceftriaxone, and IV vancomycin  Morphine as needed for pain  Tylenol as needed for fevers  Patient will have biopsy on Monday, will hold Lovenox tonight and make n.p.o. at midnight  Blood cultures x 2 ordered, pending  Daily CBC, BMP  MRSA screen negative  Infectious disease consulted, appreciate recommendations  Right testicular pain: Patient states pain feels like it is radiating in his low back to his right testicle.  Ultrasound of the scrotum and testicles was normal, showed no evidence of torsion  Continue gabapentin 100 mg 3 times daily  Hx of IV drug use: Patient has history of IV drug abuse.  States he used drugs 2 days before admission  Urine drug screen positive for methamphetamine, fentanyl, benzodiazepines  Hypertension: Patient has diagnosis of hypertension, denies use of any hypertensive medications.  Had 1 reading of elevated BP at 141/86 in ED 7/18/24.  Blood pressure stable today at 115/96  We will continue to monitor and make changes as necessary      LDA: []CVC / []PICC / []Midline / []Marquez / []Drains / []Mediport /  []None  Antibiotics: Ceftriaxone, vancomycin  Steroids: None  Labs (still needed?): [x]Yes / []No  IVF (still needed?): [x]Yes / []No    Level of care: []Step Down / [x]Med-Surg  Bed Status: [x]Inpatient / []Observation  Telemetry: []Yes / [x]No  PT/OT: [x]Yes / []No    DVT Prophylaxis: [] Lovenox / [] Heparin / [] SCDs / [] Already on Systemic Anticoagulation / [x] None     Expected discharge date: To be determined  Disposition: To be determined     Code status: Full Code     ===================================================================    Chief Complaint: Low back pain  Subjective (past 24 hours): Patient seen and examined at bedside today.  Is able to move legs and feet more compared to day of admission.  Reports pain is the same.  Did not spike any fevers overnight.     HPI / Hospital Course:  Per initial HPI  \"Jonathan Goldsmith is a 49 y.o. male with PMHx of hypertension, hyperlipidemia, IV drug use who presents to [facility] with complaints of back and groin pain for the past 3 days.  Patient states pain has been ongoing for the past 3 days.  Pain radiates down his right leg.  Rates it a 10 out of 10.  Patient was seen at [facility] yesterday and was discharged on naproxen, Flexeril, prednisone.  Patient has had past surgeries on his back which include lumbar laminectomy of L5-S1 in 2016. Patient had epidural abscess and discitis in 2017.  Patient also had lumbar decompression and I&D in 2017.   Patient denies any loss of control and bowel and bladder.  States he had said no recent injury.      ED course: Patient denies any fever, chills, chest pain.  States he is feeling little short of breath.  States he has not had pain like this in the past.  Patient stated that he did not  his medications from discharge from [facility].  MRI of the lumbar spine showed abnormal appearance at L2-L3 level.  There is edema and enhancement at this level.  Possible edema extending into the

## 2024-07-21 NOTE — PROCEDURES
PROCEDURE NOTE  Date: 7/21/2024   Name: Jonathan Goldsmith  YOB: 1975    Procedures  Bladder scan completed at 0725 and results told to Toyin COX. Scan showed 0 mL in the patients bladder. Last void was unknown.

## 2024-07-21 NOTE — PLAN OF CARE
Problem: Discharge Planning  Goal: Discharge to home or other facility with appropriate resources  Outcome: Progressing  Flowsheets (Taken 7/21/2024 1240)  Discharge to home or other facility with appropriate resources:   Identify barriers to discharge with patient and caregiver   Arrange for needed discharge resources and transportation as appropriate     Problem: Pain  Goal: Verbalizes/displays adequate comfort level or baseline comfort level  Outcome: Progressing  Flowsheets (Taken 7/21/2024 1240)  Verbalizes/displays adequate comfort level or baseline comfort level:   Assess pain using appropriate pain scale   Encourage patient to monitor pain and request assistance     Problem: Safety - Adult  Goal: Free from fall injury  Outcome: Progressing  Flowsheets (Taken 7/21/2024 1240)  Free From Fall Injury: Instruct family/caregiver on patient safety     Problem: Skin/Tissue Integrity - Adult  Goal: Skin integrity remains intact  Outcome: Progressing  Flowsheets (Taken 7/21/2024 1240)  Skin Integrity Remains Intact: Monitor for areas of redness and/or skin breakdown     Problem: Hematologic - Adult  Goal: Maintains hematologic stability  Outcome: Progressing  Flowsheets (Taken 7/21/2024 1240)  Maintains hematologic stability:   Assess for signs and symptoms of bleeding or hemorrhage   Monitor labs for bleeding or clotting disorders     Problem: Gastrointestinal - Adult  Goal: Maintains or returns to baseline bowel function  Outcome: Progressing  Flowsheets (Taken 7/21/2024 1240)  Maintains or returns to baseline bowel function:   Assess bowel function   Encourage oral fluids to ensure adequate hydration     Problem: Infection - Adult  Goal: Absence of infection at discharge  Outcome: Progressing  Flowsheets (Taken 7/21/2024 1240)  Absence of infection at discharge:   Assess and monitor for signs and symptoms of infection   Administer medications as ordered     Problem: Skin/Tissue Integrity  Goal: Absence of new  skin breakdown  Description: 1.  Monitor for areas of redness and/or skin breakdown  2.  Assess vascular access sites hourly  3.  Every 4-6 hours minimum:  Change oxygen saturation probe site  4.  Every 4-6 hours:  If on nasal continuous positive airway pressure, respiratory therapy assess nares and determine need for appliance change or resting period.  Outcome: Progressing  Note: Patient turns self in bed, pt skin intact. Pt encouraged to turn q 2 hours    Policies and procedures for [facility] explained. All questions answered with no further questions at this time. Fall prevention and safety brochure discussed with patient.  Bed alarm on. Call light in reach.

## 2024-07-21 NOTE — PLAN OF CARE
Problem: Discharge Planning  Goal: Discharge to home or other facility with appropriate resources  Outcome: Progressing  Flowsheets (Taken 7/20/2024 2150)  Discharge to home or other facility with appropriate resources:   Identify barriers to discharge with patient and caregiver   Arrange for needed discharge resources and transportation as appropriate   Identify discharge learning needs (meds, wound care, etc)     Problem: Pain  Goal: Verbalizes/displays adequate comfort level or baseline comfort level  Outcome: Progressing  Flowsheets (Taken 7/20/2024 2150)  Verbalizes/displays adequate comfort level or baseline comfort level:   Encourage patient to monitor pain and request assistance   Assess pain using appropriate pain scale   Administer analgesics based on type and severity of pain and evaluate response   Implement non-pharmacological measures as appropriate and evaluate response     Problem: Safety - Adult  Goal: Free from fall injury  Outcome: Progressing  Flowsheets (Taken 7/20/2024 2150)  Free From Fall Injury: Instruct family/caregiver on patient safety     Problem: Skin/Tissue Integrity - Adult  Goal: Skin integrity remains intact  Outcome: Progressing  Flowsheets (Taken 7/20/2024 2150)  Skin Integrity Remains Intact:   Monitor for areas of redness and/or skin breakdown   Assess vascular access sites hourly     Problem: Musculoskeletal - Adult  Goal: Return mobility to safest level of function  Outcome: Progressing  Flowsheets (Taken 7/20/2024 2150)  Return Mobility to Safest Level of Function:   Assess patient stability and activity tolerance for standing, transferring and ambulating with or without assistive devices   Assist with transfers and ambulation using safe patient handling equipment as needed   Ensure adequate protection for wounds/incisions during mobilization   Obtain physical therapy/occupational therapy consults as needed     Problem: Metabolic/Fluid and Electrolytes - Adult  Goal:

## 2024-07-21 NOTE — PROGRESS NOTES
[facility]   Pharmacy Pharmacokinetic Monitoring Service - Vancomycin    Indication: bone and joint infection  Target Concentration: Goal AUC/-600 mg*hr/L  Day of Therapy: 4  Additional Antimicrobials: ceftriaxone    Pertinent Laboratory Values:   Wt Readings from Last 1 Encounters:   07/18/24 104.3 kg (230 lb)     Temp Readings from Last 1 Encounters:   07/21/24 98 °F (36.7 °C) (Oral)     Estimated Creatinine Clearance: 129 mL/min (based on SCr of 0.9 mg/dL).  Recent Labs     07/20/24  0409 07/21/24  0421   CREATININE 0.7 0.9   BUN 15 15   WBC 13.1* 10.2     Pertinent Cultures:  Date Source Results   07/18/24 Blood x 2  ngtd   MRSA Nasal Swab: N/A. Non-respiratory infection.    Recent vancomycin administrations                     vancomycin (VANCOCIN) 1250 mg in sodium chloride 0.9% 250 mL IVPB (mg) 1,250 mg New Bag 07/20/24 2343      Restarted  1807     1,250 mg New Bag  1625     1,250 mg New Bag  1117    vancomycin (VANCOCIN) 1750 mg in sodium chloride 0.9 % 500 mL IVPB (mg) 1,750 mg New Bag 07/19/24 2134     1,750 mg New Bag  1035    vancomycin (VANCOCIN) 2500 mg in sodium chloride 0.9 % 500 mL IVPB (mg) 2,500 mg New Bag 07/18/24 2239                    Assessment:  Date/Time Current Dose Concentration Timing of Concentration (h) AUC C trough,ss   07/21/24 1250 mg IV Q8H 16.9 4 h 38 min 455 12.9   Note: Serum concentrations collected for AUC dosing may appear elevated if collected in close proximity to the dose administered, this is not necessarily an indication of toxicity    Plan:  Current dosing regimen is therapeutic  Continue current dose of vancomycin 1250 mg IV Q8H  Repeat vancomycin concentration not ordered at this time.  Will monitor closely due to frequency of doses.    Pharmacy will continue to monitor patient and adjust therapy as indicated    Thank you for the consult,    Janet Dominique, PharmD, BCPS  7/21/2024  7:14 AM

## 2024-07-22 ENCOUNTER — APPOINTMENT (OUTPATIENT)
Dept: INTERVENTIONAL RADIOLOGY/VASCULAR | Age: 49
End: 2024-07-22
Payer: COMMERCIAL

## 2024-07-22 LAB
ANION GAP SERPL CALC-SCNC: 10 MEQ/L (ref 8–16)
BASOPHILS ABSOLUTE: 0 THOU/MM3 (ref 0–0.1)
BASOPHILS NFR BLD AUTO: 0.3 %
BUN SERPL-MCNC: 15 MG/DL (ref 7–22)
CALCIUM SERPL-MCNC: 8.4 MG/DL (ref 8.5–10.5)
CHLORIDE SERPL-SCNC: 98 MEQ/L (ref 98–111)
CO2 SERPL-SCNC: 27 MEQ/L (ref 23–33)
CREAT SERPL-MCNC: 0.7 MG/DL (ref 0.4–1.2)
CRP SERPL-MCNC: 20.94 MG/DL (ref 0–1)
DEPRECATED RDW RBC AUTO: 41.1 FL (ref 35–45)
EOSINOPHIL NFR BLD AUTO: 3.6 %
EOSINOPHILS ABSOLUTE: 0.3 THOU/MM3 (ref 0–0.4)
ERYTHROCYTE [DISTWIDTH] IN BLOOD BY AUTOMATED COUNT: 13.7 % (ref 11.5–14.5)
ERYTHROCYTE [SEDIMENTATION RATE] IN BLOOD BY WESTERGREN METHOD: 56 MM/HR (ref 0–10)
GFR SERPL CREATININE-BSD FRML MDRD: > 90 ML/MIN/1.73M2
GLUCOSE SERPL-MCNC: 84 MG/DL (ref 70–108)
HCT VFR BLD AUTO: 38.8 % (ref 42–52)
HGB BLD-MCNC: 12.4 GM/DL (ref 14–18)
IMM GRANULOCYTES # BLD AUTO: 0.03 THOU/MM3 (ref 0–0.07)
IMM GRANULOCYTES NFR BLD AUTO: 0.3 %
LYMPHOCYTES ABSOLUTE: 1.6 THOU/MM3 (ref 1–4.8)
LYMPHOCYTES NFR BLD AUTO: 16.9 %
MCH RBC QN AUTO: 26.5 PG (ref 26–33)
MCHC RBC AUTO-ENTMCNC: 32 GM/DL (ref 32.2–35.5)
MCV RBC AUTO: 82.9 FL (ref 80–94)
MONOCYTES ABSOLUTE: 1.3 THOU/MM3 (ref 0.4–1.3)
MONOCYTES NFR BLD AUTO: 13.1 %
NEUTROPHILS ABSOLUTE: 6.3 THOU/MM3 (ref 1.8–7.7)
NEUTROPHILS NFR BLD AUTO: 65.8 %
NRBC BLD AUTO-RTO: 0 /100 WBC
PLATELET # BLD AUTO: 237 THOU/MM3 (ref 130–400)
PMV BLD AUTO: 9.6 FL (ref 9.4–12.4)
POTASSIUM SERPL-SCNC: 4.1 MEQ/L (ref 3.5–5.2)
RBC # BLD AUTO: 4.68 MILL/MM3 (ref 4.7–6.1)
SODIUM SERPL-SCNC: 135 MEQ/L (ref 135–145)
WBC # BLD AUTO: 9.6 THOU/MM3 (ref 4.8–10.8)

## 2024-07-22 PROCEDURE — 1200000000 HC SEMI PRIVATE

## 2024-07-22 PROCEDURE — 51798 US URINE CAPACITY MEASURE: CPT

## 2024-07-22 PROCEDURE — 6360000002 HC RX W HCPCS: Performed by: INTERNAL MEDICINE

## 2024-07-22 PROCEDURE — 6370000000 HC RX 637 (ALT 250 FOR IP): Performed by: PHYSICIAN ASSISTANT

## 2024-07-22 PROCEDURE — 0QJY3ZZ INSPECTION OF LOWER BONE, PERCUTANEOUS APPROACH: ICD-10-PCS | Performed by: RADIOLOGY

## 2024-07-22 PROCEDURE — 99222 1ST HOSP IP/OBS MODERATE 55: CPT | Performed by: NURSE PRACTITIONER

## 2024-07-22 PROCEDURE — 85651 RBC SED RATE NONAUTOMATED: CPT

## 2024-07-22 PROCEDURE — 99232 SBSQ HOSP IP/OBS MODERATE 35: CPT | Performed by: STUDENT IN AN ORGANIZED HEALTH CARE EDUCATION/TRAINING PROGRAM

## 2024-07-22 PROCEDURE — 80048 BASIC METABOLIC PNL TOTAL CA: CPT

## 2024-07-22 PROCEDURE — 86140 C-REACTIVE PROTEIN: CPT

## 2024-07-22 PROCEDURE — 2709999900 IR FLUORO GUIDED NEEDLE PLACEMENT

## 2024-07-22 PROCEDURE — 85025 COMPLETE CBC W/AUTO DIFF WBC: CPT

## 2024-07-22 PROCEDURE — 6360000002 HC RX W HCPCS

## 2024-07-22 PROCEDURE — 6370000000 HC RX 637 (ALT 250 FOR IP): Performed by: NURSE PRACTITIONER

## 2024-07-22 PROCEDURE — 36415 COLL VENOUS BLD VENIPUNCTURE: CPT

## 2024-07-22 PROCEDURE — 2580000003 HC RX 258: Performed by: INTERNAL MEDICINE

## 2024-07-22 PROCEDURE — 6370000000 HC RX 637 (ALT 250 FOR IP)

## 2024-07-22 PROCEDURE — 2580000003 HC RX 258

## 2024-07-22 PROCEDURE — 6360000002 HC RX W HCPCS: Performed by: RADIOLOGY

## 2024-07-22 PROCEDURE — 2500000003 HC RX 250 WO HCPCS: Performed by: RADIOLOGY

## 2024-07-22 RX ORDER — LIDOCAINE HYDROCHLORIDE 20 MG/ML
INJECTION, SOLUTION EPIDURAL; INFILTRATION; INTRACAUDAL; PERINEURAL PRN
Status: COMPLETED | OUTPATIENT
Start: 2024-07-22 | End: 2024-07-22

## 2024-07-22 RX ORDER — GABAPENTIN 100 MG/1
200 CAPSULE ORAL 3 TIMES DAILY
Status: DISCONTINUED | OUTPATIENT
Start: 2024-07-22 | End: 2024-07-24 | Stop reason: HOSPADM

## 2024-07-22 RX ORDER — DEXAMETHASONE SODIUM PHOSPHATE 4 MG/ML
10 INJECTION, SOLUTION INTRA-ARTICULAR; INTRALESIONAL; INTRAMUSCULAR; INTRAVENOUS; SOFT TISSUE ONCE
Status: COMPLETED | OUTPATIENT
Start: 2024-07-22 | End: 2024-07-22

## 2024-07-22 RX ORDER — MIDAZOLAM HYDROCHLORIDE 1 MG/ML
INJECTION INTRAMUSCULAR; INTRAVENOUS PRN
Status: COMPLETED | OUTPATIENT
Start: 2024-07-22 | End: 2024-07-22

## 2024-07-22 RX ORDER — OXYCODONE HYDROCHLORIDE 5 MG/1
10 TABLET ORAL EVERY 4 HOURS PRN
Status: DISCONTINUED | OUTPATIENT
Start: 2024-07-22 | End: 2024-07-24 | Stop reason: HOSPADM

## 2024-07-22 RX ORDER — DOCUSATE SODIUM 100 MG/1
100 CAPSULE, LIQUID FILLED ORAL 2 TIMES DAILY
Status: DISCONTINUED | OUTPATIENT
Start: 2024-07-22 | End: 2024-07-24 | Stop reason: HOSPADM

## 2024-07-22 RX ORDER — DEXAMETHASONE SODIUM PHOSPHATE 4 MG/ML
6 INJECTION, SOLUTION INTRA-ARTICULAR; INTRALESIONAL; INTRAMUSCULAR; INTRAVENOUS; SOFT TISSUE EVERY 6 HOURS
Status: DISCONTINUED | OUTPATIENT
Start: 2024-07-22 | End: 2024-07-24 | Stop reason: HOSPADM

## 2024-07-22 RX ORDER — NALOXONE HYDROCHLORIDE 0.4 MG/ML
0.4 INJECTION, SOLUTION INTRAMUSCULAR; INTRAVENOUS; SUBCUTANEOUS PRN
Status: DISCONTINUED | OUTPATIENT
Start: 2024-07-22 | End: 2024-07-24 | Stop reason: HOSPADM

## 2024-07-22 RX ORDER — OXYCODONE HYDROCHLORIDE 5 MG/1
5 TABLET ORAL EVERY 4 HOURS PRN
Status: DISCONTINUED | OUTPATIENT
Start: 2024-07-22 | End: 2024-07-24 | Stop reason: HOSPADM

## 2024-07-22 RX ORDER — DEXAMETHASONE SODIUM PHOSPHATE 4 MG/ML
6 INJECTION, SOLUTION INTRA-ARTICULAR; INTRALESIONAL; INTRAMUSCULAR; INTRAVENOUS; SOFT TISSUE EVERY 6 HOURS
Status: DISCONTINUED | OUTPATIENT
Start: 2024-07-22 | End: 2024-07-22 | Stop reason: SDUPTHER

## 2024-07-22 RX ORDER — FENTANYL CITRATE 50 UG/ML
INJECTION, SOLUTION INTRAMUSCULAR; INTRAVENOUS PRN
Status: COMPLETED | OUTPATIENT
Start: 2024-07-22 | End: 2024-07-22

## 2024-07-22 RX ORDER — SENNOSIDES A AND B 8.6 MG/1
2 TABLET, FILM COATED ORAL NIGHTLY
Status: DISCONTINUED | OUTPATIENT
Start: 2024-07-22 | End: 2024-07-24 | Stop reason: HOSPADM

## 2024-07-22 RX ADMIN — MORPHINE SULFATE 4 MG: 4 INJECTION, SOLUTION INTRAMUSCULAR; INTRAVENOUS at 11:50

## 2024-07-22 RX ADMIN — CEFTRIAXONE SODIUM 1000 MG: 1 INJECTION, POWDER, FOR SOLUTION INTRAMUSCULAR; INTRAVENOUS at 07:52

## 2024-07-22 RX ADMIN — GABAPENTIN 200 MG: 100 CAPSULE ORAL at 21:21

## 2024-07-22 RX ADMIN — MORPHINE SULFATE 4 MG: 4 INJECTION, SOLUTION INTRAMUSCULAR; INTRAVENOUS at 07:49

## 2024-07-22 RX ADMIN — FENTANYL CITRATE 50 MCG: 50 INJECTION, SOLUTION INTRAMUSCULAR; INTRAVENOUS at 10:23

## 2024-07-22 RX ADMIN — HYDROCODONE BITARTRATE AND ACETAMINOPHEN 2 TABLET: 5; 325 TABLET ORAL at 13:33

## 2024-07-22 RX ADMIN — LIDOCAINE HYDROCHLORIDE 20 ML: 20 INJECTION, SOLUTION EPIDURAL; INFILTRATION; INTRACAUDAL; PERINEURAL at 10:38

## 2024-07-22 RX ADMIN — MIDAZOLAM 1 MG: 1 INJECTION INTRAMUSCULAR; INTRAVENOUS at 10:24

## 2024-07-22 RX ADMIN — CYCLOBENZAPRINE 10 MG: 10 TABLET, FILM COATED ORAL at 03:13

## 2024-07-22 RX ADMIN — GABAPENTIN 100 MG: 100 CAPSULE ORAL at 13:38

## 2024-07-22 RX ADMIN — SENNOSIDES 17.2 MG: 8.6 TABLET, FILM COATED ORAL at 21:21

## 2024-07-22 RX ADMIN — HYDROCODONE BITARTRATE AND ACETAMINOPHEN 2 TABLET: 5; 325 TABLET ORAL at 03:13

## 2024-07-22 RX ADMIN — CEFEPIME 2000 MG: 2 INJECTION, POWDER, FOR SOLUTION INTRAVENOUS at 13:32

## 2024-07-22 RX ADMIN — FENTANYL CITRATE 50 MCG: 50 INJECTION, SOLUTION INTRAMUSCULAR; INTRAVENOUS at 10:13

## 2024-07-22 RX ADMIN — DOCUSATE SODIUM 100 MG: 100 CAPSULE, LIQUID FILLED ORAL at 21:21

## 2024-07-22 RX ADMIN — Medication 1250 MG: at 08:34

## 2024-07-22 RX ADMIN — DEXAMETHASONE SODIUM PHOSPHATE 10 MG: 4 INJECTION, SOLUTION INTRA-ARTICULAR; INTRALESIONAL; INTRAMUSCULAR; INTRAVENOUS; SOFT TISSUE at 13:33

## 2024-07-22 RX ADMIN — DEXAMETHASONE SODIUM PHOSPHATE 6 MG: 4 INJECTION, SOLUTION INTRA-ARTICULAR; INTRALESIONAL; INTRAMUSCULAR; INTRAVENOUS; SOFT TISSUE at 21:21

## 2024-07-22 RX ADMIN — CEFEPIME 2000 MG: 2 INJECTION, POWDER, FOR SOLUTION INTRAVENOUS at 21:27

## 2024-07-22 RX ADMIN — OXYCODONE 10 MG: 5 TABLET ORAL at 16:57

## 2024-07-22 RX ADMIN — MIDAZOLAM 1 MG: 1 INJECTION INTRAMUSCULAR; INTRAVENOUS at 10:13

## 2024-07-22 RX ADMIN — MIDAZOLAM 1 MG: 1 INJECTION INTRAMUSCULAR; INTRAVENOUS at 10:18

## 2024-07-22 RX ADMIN — Medication 1250 MG: at 00:13

## 2024-07-22 RX ADMIN — SODIUM CHLORIDE, PRESERVATIVE FREE 10 ML: 5 INJECTION INTRAVENOUS at 21:22

## 2024-07-22 RX ADMIN — MORPHINE SULFATE 4 MG: 4 INJECTION, SOLUTION INTRAMUSCULAR; INTRAVENOUS at 00:07

## 2024-07-22 RX ADMIN — FENTANYL CITRATE 50 MCG: 50 INJECTION, SOLUTION INTRAMUSCULAR; INTRAVENOUS at 10:18

## 2024-07-22 ASSESSMENT — PAIN SCALES - GENERAL
PAINLEVEL_OUTOF10: 8
PAINLEVEL_OUTOF10: 8
PAINLEVEL_OUTOF10: 10
PAINLEVEL_OUTOF10: 8
PAINLEVEL_OUTOF10: 9

## 2024-07-22 ASSESSMENT — PATIENT HEALTH QUESTIONNAIRE - PHQ9
SUM OF ALL RESPONSES TO PHQ QUESTIONS 1-9: 0
SUM OF ALL RESPONSES TO PHQ QUESTIONS 1-9: 0
1. LITTLE INTEREST OR PLEASURE IN DOING THINGS: NOT AT ALL
SUM OF ALL RESPONSES TO PHQ QUESTIONS 1-9: 0
2. FEELING DOWN, DEPRESSED OR HOPELESS: NOT AT ALL
SUM OF ALL RESPONSES TO PHQ QUESTIONS 1-9: 0
SUM OF ALL RESPONSES TO PHQ9 QUESTIONS 1 & 2: 0

## 2024-07-22 ASSESSMENT — PAIN DESCRIPTION - DIRECTION: RADIATING_TOWARDS: DOWN RIGHT LEG

## 2024-07-22 ASSESSMENT — PAIN DESCRIPTION - DESCRIPTORS: DESCRIPTORS: SHARP

## 2024-07-22 ASSESSMENT — PAIN DESCRIPTION - LOCATION
LOCATION: BACK

## 2024-07-22 ASSESSMENT — LIFESTYLE VARIABLES
HOW MANY STANDARD DRINKS CONTAINING ALCOHOL DO YOU HAVE ON A TYPICAL DAY: 3 OR 4
HOW OFTEN DO YOU HAVE A DRINK CONTAINING ALCOHOL: 4 OR MORE TIMES A WEEK

## 2024-07-22 ASSESSMENT — PAIN - FUNCTIONAL ASSESSMENT: PAIN_FUNCTIONAL_ASSESSMENT: PREVENTS OR INTERFERES SOME ACTIVE ACTIVITIES AND ADLS

## 2024-07-22 ASSESSMENT — PAIN DESCRIPTION - ONSET: ONSET: ON-GOING

## 2024-07-22 ASSESSMENT — PAIN DESCRIPTION - ORIENTATION: ORIENTATION: LOWER;RIGHT

## 2024-07-22 ASSESSMENT — PAIN DESCRIPTION - PAIN TYPE: TYPE: ACUTE PAIN

## 2024-07-22 ASSESSMENT — PAIN DESCRIPTION - FREQUENCY: FREQUENCY: CONTINUOUS

## 2024-07-22 NOTE — H&P
[facility]  Sedation/Analgesia History & Physical    Pt Name: Jonathan Goldsmith  MRN: [id number]  YOB: 1975  Provider Performing Procedure: Jean Marie Jack MD, MD  Primary Care Physician: Lan Davenport MD    Formulation and discussion of sedation / procedure plans, risks, benefits, side effects and alternatives with patient and/or responsible adult completed.    PRE-PROCEDURE   DNR-CCA/DNR-CC []Yes [x]No  Brief History/Pre-Procedure Diagnosis: diskitis          MEDICAL HISTORY  []CAD/Valve  []Liver Disease  []Lung Disease []Diabetes  []Hypertension []Renal Disease  []Additional information:       has a past medical history of Anemia, Discitis of lumbar region, Epidural abscess, IVDU (intravenous drug user), and Osteomyelitis of spine (HCC)  multilevels  from t11 to l5.    SURGICAL HISTORY   has a past surgical history that includes Cholecystectomy; lumbar laminectomy (12/30/2016); back surgery (02/17/2017); and back surgery (06/30/2017).  Additional information:       ALLERGIES   Allergies as of 07/18/2024    (No Known Allergies)     Additional information:       MEDICATIONS   Coumadin Use Last 5 Days [x]No []Yes  Antiplatelet drug therapy use last 5 days  [x]No []Yes  Other anticoagulant use last 5 days  [x]No []Yes    Current Facility-Administered Medications:     [COMPLETED] vancomycin (VANCOCIN) 2500 mg in sodium chloride 0.9 % 500 mL IVPB, 2,500 mg, IntraVENous, Once, Stopped at 07/19/24 0217 **FOLLOWED BY** vancomycin (VANCOCIN) 1750 mg in sodium chloride 0.9 % 500 mL IVPB, 1,750 mg, IntraVENous, Q8H, Barbara Adams, [facility]    fentaNYL (SUBLIMAZE) injection, , , PRN, Jean Marie Jack MD, 50 mcg at 07/22/24 1013    midazolam (VERSED) injection, , , PRN, Jean Marie Jack MD, 1 mg at 07/22/24 1013    HYDROcodone-acetaminophen (NORCO) 5-325 MG per tablet 1 tablet, 1 tablet, Oral, Q4H PRN **OR** HYDROcodone-acetaminophen (NORCO) 5-325 MG per tablet 2 tablet, 2 tablet, Oral, Q4H PRN, Madai  Classification:   []1 [x]2 []3 []4    [x]Pre-procedure diagnostic studies complete and results available.   Comment:    [x]Previous sedation/anesthesia experiences assessed.   Comment:    [x]The patient is an appropriate candidate to undergo the planned procedure sedation and anesthesia. (Refer to nursing sedation/analgesia documentation record)  [x]Formulation and discussion of sedation/procedure plan, risks, and expectations with patient and/or responsible adult completed.  [x]Patient examined immediately prior to the procedure. (Refer to nursing sedation/analgesia documentation record)    Jean Marie Jack MD, MD  Electronically signed 7/22/2024 at 10:16 AM

## 2024-07-22 NOTE — PROGRESS NOTES
[facility]   Pharmacy Pharmacokinetic Monitoring Service - Vancomycin    Indication: bone and joint infection  Target Concentration: Goal AUC/-600 mg*hr/L  Day of Therapy: 5  Additional Antimicrobials: ceftriaxone    Pertinent Laboratory Values:   Wt Readings from Last 1 Encounters:   07/18/24 104.3 kg (230 lb)     Temp Readings from Last 1 Encounters:   07/22/24 99.1 °F (37.3 °C) (Oral)     Estimated Creatinine Clearance: 166 mL/min (based on SCr of 0.7 mg/dL).  Recent Labs     07/21/24  0421 07/22/24  0529   CREATININE 0.9 0.7   BUN 15 15   WBC 10.2 9.6     Pertinent Cultures:  Date Source Results   07/18/24 Blood x 2  ngtd   MRSA Nasal Swab: negative (Non-respiratory infection)    Recent vancomycin administrations                     vancomycin (VANCOCIN) 1250 mg in sodium chloride 0.9% 250 mL IVPB (mg) 1,250 mg New Bag 07/22/24 0013     1,250 mg New Bag 07/21/24 1614     1,250 mg New Bag  0857     1,250 mg New Bag 07/20/24 2343      Restarted  1807     1,250 mg New Bag  1625     1,250 mg New Bag  1117    vancomycin (VANCOCIN) 1750 mg in sodium chloride 0.9 % 500 mL IVPB (mg) 1,750 mg New Bag 07/19/24 2134     1,750 mg New Bag  1035                    Assessment:  Date/Time Current Dose Concentration Timing of Concentration (h) AUC C trough,ss   07/21/24 1250 mg IV Q8H 16.9 4 h 38 min 455 12.9   Note: Serum concentrations collected for AUC dosing may appear elevated if collected in close proximity to the dose administered, this is not necessarily an indication of toxicity    Plan:  Current dosing regimen is sub-therapeutic  Increase dose to 1750 mg q8h    New predicted AUC is 470 with a trough of 12.3  Repeat vancomycin concentration ordered for tomorrow in the AM   Pharmacy will continue to monitor patient and adjust therapy as indicated    Thank you for the consult,    Barbara Adams, PharmD  PGY2 Resident   7/22/2024 8:31 AM

## 2024-07-22 NOTE — PLAN OF CARE
Problem: Discharge Planning  Goal: Discharge to home or other facility with appropriate resources  7/21/2024 2133 by Marylin Skinner RN  Outcome: Progressing  Flowsheets (Taken 7/21/2024 2133)  Discharge to home or other facility with appropriate resources: Identify barriers to discharge with patient and caregiver     Problem: Pain  Goal: Verbalizes/displays adequate comfort level or baseline comfort level  7/21/2024 2133 by Marylin Skinner RN  Outcome: Progressing  Flowsheets (Taken 7/21/2024 2133)  Verbalizes/displays adequate comfort level or baseline comfort level:   Administer analgesics based on type and severity of pain and evaluate response   Encourage patient to monitor pain and request assistance   Assess pain using appropriate pain scale   Implement non-pharmacological measures as appropriate and evaluate response     Problem: Safety - Adult  Goal: Free from fall injury  7/21/2024 2133 by Marylin Skinner RN  Outcome: Progressing  Flowsheets (Taken 7/21/2024 2133)  Free From Fall Injury: Instruct family/caregiver on patient safety  Note: Patient has remained free of physical injury during this shift. Safe environment provided, call light within reach, and hourly rounding completed.    Care plan reviewed with patient.  Patient  verbalize understanding of the plan of care and contribute to goal setting.

## 2024-07-22 NOTE — H&P
Formulation and discussion of sedation / procedure plans, risks, benefits, side effects and alternatives with patient and/or responsible adult completed.    History and Physical reviewed and unchanged.    Electronically signed by Jean Marie Jack MD on 7/22/24 at 10:16 AM EDT

## 2024-07-22 NOTE — PROGRESS NOTES
0955 Patient received in IR for Biopsy of L2-L3 to rule out infection under conscious sedation.   0957 This procedure has been fully reviewed with the patient and written informed consent has been obtained.  1015 Procedure started with   1025 Procedure completed; patient tolerated well. Dressing to lower back, bandaid; no bleeding noted.  1030 Patient on bed; comfort ensured.  1035 Patient taken to 7k23 via bed. Pt alert and oriented x3; follows commands. Skin pink, warm, and dry. Respirations easy, regular, and nonlabored.

## 2024-07-22 NOTE — PROCEDURES
PROCEDURE NOTE  Date: 7/22/2024   Name: Jonathan Goldsmith  YOB: 1975    Procedures  Bladder scan completed at 1240 and results told to Kary COX. Scan showed 345 mL in the patients bladder. Last void was unknown. Ksenia MACARIO

## 2024-07-22 NOTE — OP NOTE
Department of Radiology  Post Procedure Progress Note      Pre-Procedure Diagnosis:  apparent diskitis    Procedure Performed:  attempted Disk biopsy    Anesthesia: local / versed and fentanyl    Findings: pt could not tolerate , procedure aborted     Immediate Complications:  None    Estimated Blood Loss: minimal    SEE DICTATED PROCEDURE NOTE FOR COMPLETE DETAILS.    Jean Marie Jack MD   7/22/2024 10:26 AM

## 2024-07-22 NOTE — PROGRESS NOTES
ID Progress Note    Patient - Jonathan Goldsmith,  Age - 49 y.o.    - 1975      Room Number - 7K-23/023-A   MRN -  [id number]   [id number]  Date of Admission -  2024  9:01 AM    SUBJECTIVE:   Continues to have low back pain complaining of right-sided radiculopathy radiating down leg.  No fever or chills overnight.  Complaining of difficulty with urination today will order bladder scan    OBJECTIVE   VITALS    height is 1.88 m (6' 2\") and weight is 104.3 kg (230 lb). His oral temperature is 99.1 °F (37.3 °C). His blood pressure is 129/79 and his pulse is 77. His respiration is 16 and oxygen saturation is 94%.       Wt Readings from Last 3 Encounters:   24 104.3 kg (230 lb)   24 104.3 kg (230 lb)   17 108.9 kg (240 lb)       I/O (24 Hours)    Intake/Output Summary (Last 24 hours) at 2024 0936  Last data filed at 2024 0925  Gross per 24 hour   Intake 630 ml   Output 1200 ml   Net -570 ml       General Appearance  Awake, alert, oriented,  not  In acute distress  HEENT - normocephalic, atraumatic, pink conjunctiva,  anicteric sclera  Neck - Supple, no mass  Lungs -  Bilateral good air entry, no rhonchi, no wheeze  Cardiovascular - Heart sounds are normal.  Regular rate and rhythm without murmur, gallop or rub.  Abdomen - soft, not distended, nontender, no organomegally,  Neurologic -cranial nerves grossly intact  Skin - No bruising or bleeding  Extremities - No edema, no cyanosis, clubbing     MEDICATIONS:      vancomycin  1,750 mg IntraVENous Q8H    gabapentin  100 mg Oral TID    sodium chloride flush  5-40 mL IntraVENous 2 times per day    [Held by provider] enoxaparin  30 mg SubCUTAneous BID    cefTRIAXone (ROCEPHIN) IV  1,000 mg IntraVENous Q12H    lidocaine  1 patch TransDERmal Daily    vancomycin (VANCOCIN) intermittent dosing (placeholder)   Other RX Placeholder      sodium chloride       HYDROcodone 5 mg - acetaminophen **OR** HYDROcodone 5 mg - acetaminophen,

## 2024-07-22 NOTE — CONSULTS
Brief Intervention and Referral to Treatment Summary    Patient was provided PHQ-9, AUDIT-C and DAST Screening:      PHQ-9 Score: 0  AUDIT-C Score:  7  DAST Score:  4    Patient’s substance use is considered     Moderate Risk      Patient’s depression is considered:     Minimal    Brief Education Was Provided    Patient was receptive      Brief Intervention Is Provided (Only for AUDIT-C or DAST)     Patient reports readiness to decrease and/or stop use and a plan was discussed       Recommendations/Referrals for Brief and/or Specialized Treatment Provided to Patient:    Patient did accept a resource packet from , however he does not appear interested in hearing what  had to offer. He declined wanting SW to go through the packet with him, just stated \"leave it there on the table and I'll look when I'm ready\". Made him aware that if he does have further questions, he can ask RN to call [facility] SW back to room.

## 2024-07-22 NOTE — CONSULTS
Pain Consult Note      Admitting Physician: Adrian Glasgow MD    Primary Care Provider: Lan Davenport MD     Reason for Consult:  Low back pain and groin pain     History of Present Illness:  Jonathan Goldsmith is a 49 y.o. male admitted to [facility] on 7/18/2024. This patient with a medical history significant for multiple lumbar back surgeries, iv drug abuse, osteomyelitis, discitis of lumbar region, HTN, and hyperlipidemia who presented to [facility] with a chief complaints of low back and groin pain for the past week. He had a recent visit at [facility] last week and was discharged on naproxen, Flexeril, prednisone. Work up was completed and he had a lumbar MRI which showed an abnormal appearance to the L2-L3 level. This is the level above the fusion.There is edema and enhancement. There is possible edema extending into the leftpsoas muscle which could represent degenerative marrow changes and a strain of the left psoas muscle. Infection at the L2-3 level is also a consideration.     Patient underwent a attempt at IR biopsy under conscious sedation, but was aborted due to patient not tolerating.     Off heroin for 2.5 months. When he was using it was every day, around 8 ball a day, that he did for years. Now reports he has been using meth, wanting to get off, but was using it every day. He reports using for the last 5 years.     The patient was seen by the pain team today and at the time of our evaluation the patient complains of pain at a 7/10 currently in right sided low back, that radiates down the side of his leg to his toes. He denies any left sided pain. He describes the pain as a constant burning pain, with spasms but denies numbness or tingling. He states his pain will get up to a 10/10, and with medications will bring his pain down to 5-6/10 at best. He reports that a 5/10 is a tolerable level for him. He reports that this pain started about 3-4 days ago, but

## 2024-07-22 NOTE — CARE COORDINATION
24, 2:28 PM EDT    DISCHARGE ON GOING EVALUATION    [facility]       [facility] day: 4  Location: 7K--A Reason for admit: Osteomyelitis (HCC) [M86.9]  Intractable back pain [M54.9]     Procedures:    IR: Failed spinal biopsy of L2-L3. Patient was unable to tolerate.     Imaging since last note:    CT Lumbar Spine:   1. Endplate sclerosis and loss of disc height at the L2-3 level. There is no endplate destruction. The appearance on CT favors degenerative changes in the endplates. Enlargement of the left psoas muscle compared to the right. There is some trace fat stranding associated with the left psoas muscle. This could represent a muscular hematoma within the psoas muscle. Unfortunately, infection within the muscle or myositis cannot be excluded.    Barriers to Discharge: Hospitalist and ID following. ID reports plans for IV cefepime Q24 x 6 weeks. Will need PICC. IV Decadron Q6. PRN Flexeril. PRN Norco. PRN IV Morphine.     PCP: Lan Davenport MD  Readmission Risk Score: 8.5    Patient Goals/Plan/Treatment Preferences: Jonathan is from home alone. His mother and partner are both  per patient. Plan is to dc to [facility] if accepted.     [id number] Spoke with Jonathan about discharge needs with IV abx and IV steroids. Dr Carroll cannot not safely discharge home with PICC due to hx IV drug use and + UDS on admission. Discussed SNF vs LTACH. Patient would like to proceed to LTACH referral. Gave LTACH list, and he would like to try [facility] as it is the closest.     [id number] Spoke with Jayshree from [facility] who will review patient.     [id number] Called Norma at [employer] to notify of incoming referral.

## 2024-07-22 NOTE — PROGRESS NOTES
TRANSFER - OUT REPORT:    Verbal report given to Scarlet(name) on Jonathan Goldsmith being transferred to [facility](unit) for routine progression of patient care       Report consisted of patient's Situation, Background, Assessment and   Recommendations(SBAR).     Information from the following report(s) Nurse Handoff Report, Surgery Report, and MAR was reviewed with the receiving nurse.    Opportunity for questions and clarification was provided.      Patient transported with:   Monitor

## 2024-07-22 NOTE — PROGRESS NOTES
Hospitalist Progress Note  Internal Medicine Resident      Patient: Jonathan Goldsmith 49 y.o. male      Unit/Bed: K-23/023-A    Admit Date: 7/18/2024      ASSESSMENT AND PLAN  Active Problems  Suspected osteomyelitis: Patient has history of lumbar laminectomy of L5-S1 in 2016.  Patient had epidural abscess and discitis in 2017.  Patient had lumbar decompression, I&D in 2017.  Patient has had osteomyelitis of the spine in the past. MRI of the lumbar spine showed abnormal appearance at L2-L3 level.  There is edema and enhancement at this level.  Possible edema extending into the left psoas muscle. Infection at the L2-3 level is also a consideration.  Repeat MRI of the lumbar spine w/wo contrast in 48 hours may be beneficial.  Patient having lumbar back pain radiating down right leg.  Patient had 1 fever episode 7/20 at 101.9, resolved with Tylenol. MRSA screen negative  Patient could not complete biopsy due to pain  Switch to IV cefepime for 6 weeks  Discontinue Norco and morphine  Added on Decadron 10 mg today, Decadron 6 mg every 6 hours for 3 days  Voltaren gel every 4 hours as needed added  Pain management consulted, appreciate recommendations  Tylenol as needed for fevers  Blood cultures x 2 no growth   Daily CBC, BMP  Infectious disease consulted, appreciate recommendations  Right testicular pain: Patient states pain feels like it is radiating in his low back to his right testicle.  Ultrasound of the scrotum and testicles was normal, showed no evidence of torsion  Increase gabapentin to 200 mg 3 times daily  Hx of IV drug use: Patient has history of IV drug abuse.  States he used drugs 2 days before admission  Urine drug screen positive for methamphetamine, fentanyl, benzodiazepines  Hypertension: Patient has diagnosis of hypertension, denies use of any hypertensive medications.  Had 1 reading of elevated BP at 141/86 in ED 7/18/24.  Blood pressure stable today at 120/79  We will continue to monitor and make  MRI of the lumbar spine showed abnormal appearance at L2-L3 level.  There is edema and enhancement at this level.  Possible edema extending into the left psoas muscle.  Infection at the L2-3 level is also a consideration.  Repeat MRI of the lumbar spine w/wo contrast in 48 hours may be beneficial.  Vitals in ED were RR 18, HR 83, /86.  Patient was given Toradol, morphine, Norflex in the ED.\"    Medications:    Infusion Medications    sodium chloride      Scheduled Medications    dexAMETHasone  6 mg IntraVENous Q6H    cefepime  2,000 mg IntraVENous Q8H    gabapentin  200 mg Oral TID    senna  2 tablet Oral Nightly    docusate sodium  100 mg Oral BID    sodium chloride flush  5-40 mL IntraVENous 2 times per day    [Held by provider] enoxaparin  30 mg SubCUTAneous BID    PRN Meds: naloxone, oxyCODONE **OR** oxyCODONE, diclofenac sodium, cyclobenzaprine, LORazepam, sodium chloride flush, sodium chloride, potassium chloride **OR** potassium alternative oral replacement **OR** potassium chloride, magnesium sulfate, ondansetron **OR** ondansetron, polyethylene glycol, acetaminophen **OR** acetaminophen    Exam:  /79   Pulse 85   Temp 99.8 °F (37.7 °C) (Rectal)   Resp 14   Ht 1.88 m (6' 2\")   Wt 104.3 kg (230 lb)   SpO2 98%   BMI 29.53 kg/m²   General: In pain, appears stated age.  Eyes:  PERRL. Conjunctivae/corneas clear.  HENT: Head normal appearing. Nares normal. Oral mucosa moist.  Hearing intact.   Neck: Supple, with full range of motion. Trachea midline.  No gross JVD appreciated.  Respiratory:  Normal effort. Clear to auscultation, without rales or wheezes or rhonchi.  Cardiovascular: Normal rate, regular rhythm with normal S1/S2 without murmurs.    No lower extremity edema.   Abdomen: Soft, non-tender, non-distended with normal bowel sounds.  Musculoskeletal: No joint swelling or tenderness. Normal tone. No abnormal movements.   Skin: Warm and dry. No rashes or lesions.  Neurologic: Pain in

## 2024-07-23 LAB
ANION GAP SERPL CALC-SCNC: 11 MEQ/L (ref 8–16)
BACTERIA BLD AEROBE CULT: NORMAL
BACTERIA BLD AEROBE CULT: NORMAL
BASOPHILS ABSOLUTE: 0 THOU/MM3 (ref 0–0.1)
BASOPHILS NFR BLD AUTO: 0.1 %
BUN SERPL-MCNC: 20 MG/DL (ref 7–22)
CALCIUM SERPL-MCNC: 8.9 MG/DL (ref 8.5–10.5)
CHLORIDE SERPL-SCNC: 100 MEQ/L (ref 98–111)
CO2 SERPL-SCNC: 24 MEQ/L (ref 23–33)
CREAT SERPL-MCNC: 0.6 MG/DL (ref 0.4–1.2)
DEPRECATED RDW RBC AUTO: 40.5 FL (ref 35–45)
EOSINOPHIL NFR BLD AUTO: 0 %
EOSINOPHILS ABSOLUTE: 0 THOU/MM3 (ref 0–0.4)
ERYTHROCYTE [DISTWIDTH] IN BLOOD BY AUTOMATED COUNT: 13.2 % (ref 11.5–14.5)
GFR SERPL CREATININE-BSD FRML MDRD: > 90 ML/MIN/1.73M2
GLUCOSE SERPL-MCNC: 130 MG/DL (ref 70–108)
HCT VFR BLD AUTO: 45.3 % (ref 42–52)
HGB BLD-MCNC: 14.3 GM/DL (ref 14–18)
IMM GRANULOCYTES # BLD AUTO: 0.04 THOU/MM3 (ref 0–0.07)
IMM GRANULOCYTES NFR BLD AUTO: 0.4 %
LYMPHOCYTES ABSOLUTE: 0.8 THOU/MM3 (ref 1–4.8)
LYMPHOCYTES NFR BLD AUTO: 9.2 %
MCH RBC QN AUTO: 26.5 PG (ref 26–33)
MCHC RBC AUTO-ENTMCNC: 31.6 GM/DL (ref 32.2–35.5)
MCV RBC AUTO: 83.9 FL (ref 80–94)
MONOCYTES ABSOLUTE: 0.4 THOU/MM3 (ref 0.4–1.3)
MONOCYTES NFR BLD AUTO: 4.2 %
NEUTROPHILS ABSOLUTE: 7.7 THOU/MM3 (ref 1.8–7.7)
NEUTROPHILS NFR BLD AUTO: 86.1 %
NRBC BLD AUTO-RTO: 0 /100 WBC
PLATELET # BLD AUTO: 328 THOU/MM3 (ref 130–400)
PMV BLD AUTO: 9.2 FL (ref 9.4–12.4)
POTASSIUM SERPL-SCNC: 5.1 MEQ/L (ref 3.5–5.2)
RBC # BLD AUTO: 5.4 MILL/MM3 (ref 4.7–6.1)
SODIUM SERPL-SCNC: 135 MEQ/L (ref 135–145)
WBC # BLD AUTO: 9 THOU/MM3 (ref 4.8–10.8)

## 2024-07-23 PROCEDURE — 2580000003 HC RX 258: Performed by: INTERNAL MEDICINE

## 2024-07-23 PROCEDURE — 2580000003 HC RX 258

## 2024-07-23 PROCEDURE — 6360000002 HC RX W HCPCS: Performed by: INTERNAL MEDICINE

## 2024-07-23 PROCEDURE — 80048 BASIC METABOLIC PNL TOTAL CA: CPT

## 2024-07-23 PROCEDURE — 36415 COLL VENOUS BLD VENIPUNCTURE: CPT

## 2024-07-23 PROCEDURE — 6360000002 HC RX W HCPCS

## 2024-07-23 PROCEDURE — 99232 SBSQ HOSP IP/OBS MODERATE 35: CPT | Performed by: STUDENT IN AN ORGANIZED HEALTH CARE EDUCATION/TRAINING PROGRAM

## 2024-07-23 PROCEDURE — 6370000000 HC RX 637 (ALT 250 FOR IP): Performed by: NURSE PRACTITIONER

## 2024-07-23 PROCEDURE — 1200000000 HC SEMI PRIVATE

## 2024-07-23 PROCEDURE — 99232 SBSQ HOSP IP/OBS MODERATE 35: CPT | Performed by: NURSE PRACTITIONER

## 2024-07-23 PROCEDURE — 85025 COMPLETE CBC W/AUTO DIFF WBC: CPT

## 2024-07-23 RX ADMIN — DEXAMETHASONE SODIUM PHOSPHATE 6 MG: 4 INJECTION, SOLUTION INTRA-ARTICULAR; INTRALESIONAL; INTRAMUSCULAR; INTRAVENOUS; SOFT TISSUE at 14:10

## 2024-07-23 RX ADMIN — SODIUM CHLORIDE, PRESERVATIVE FREE 10 ML: 5 INJECTION INTRAVENOUS at 01:26

## 2024-07-23 RX ADMIN — DOCUSATE SODIUM 100 MG: 100 CAPSULE, LIQUID FILLED ORAL at 19:55

## 2024-07-23 RX ADMIN — DEXAMETHASONE SODIUM PHOSPHATE 6 MG: 4 INJECTION, SOLUTION INTRA-ARTICULAR; INTRALESIONAL; INTRAMUSCULAR; INTRAVENOUS; SOFT TISSUE at 19:55

## 2024-07-23 RX ADMIN — DEXAMETHASONE SODIUM PHOSPHATE 6 MG: 4 INJECTION, SOLUTION INTRA-ARTICULAR; INTRALESIONAL; INTRAMUSCULAR; INTRAVENOUS; SOFT TISSUE at 08:30

## 2024-07-23 RX ADMIN — OXYCODONE 10 MG: 5 TABLET ORAL at 19:54

## 2024-07-23 RX ADMIN — DEXAMETHASONE SODIUM PHOSPHATE 6 MG: 4 INJECTION, SOLUTION INTRA-ARTICULAR; INTRALESIONAL; INTRAMUSCULAR; INTRAVENOUS; SOFT TISSUE at 01:20

## 2024-07-23 RX ADMIN — CEFEPIME 2000 MG: 2 INJECTION, POWDER, FOR SOLUTION INTRAVENOUS at 20:01

## 2024-07-23 RX ADMIN — CEFEPIME 2000 MG: 2 INJECTION, POWDER, FOR SOLUTION INTRAVENOUS at 12:28

## 2024-07-23 RX ADMIN — CEFEPIME 2000 MG: 2 INJECTION, POWDER, FOR SOLUTION INTRAVENOUS at 05:06

## 2024-07-23 RX ADMIN — OXYCODONE 10 MG: 5 TABLET ORAL at 01:21

## 2024-07-23 RX ADMIN — OXYCODONE 10 MG: 5 TABLET ORAL at 12:23

## 2024-07-23 RX ADMIN — SENNOSIDES 17.2 MG: 8.6 TABLET, FILM COATED ORAL at 19:54

## 2024-07-23 RX ADMIN — GABAPENTIN 200 MG: 100 CAPSULE ORAL at 19:54

## 2024-07-23 RX ADMIN — SODIUM CHLORIDE: 9 INJECTION, SOLUTION INTRAVENOUS at 01:44

## 2024-07-23 ASSESSMENT — PAIN DESCRIPTION - DIRECTION: RADIATING_TOWARDS: DOWN RIGHT LEG

## 2024-07-23 ASSESSMENT — PAIN DESCRIPTION - DESCRIPTORS
DESCRIPTORS: ACHING;DISCOMFORT
DESCRIPTORS: ACHING;DISCOMFORT
DESCRIPTORS: THROBBING
DESCRIPTORS: SHARP;ACHING
DESCRIPTORS: THROBBING

## 2024-07-23 ASSESSMENT — PAIN DESCRIPTION - LOCATION
LOCATION: BACK

## 2024-07-23 ASSESSMENT — PAIN SCALES - GENERAL
PAINLEVEL_OUTOF10: 7
PAINLEVEL_OUTOF10: 8
PAINLEVEL_OUTOF10: 8
PAINLEVEL_OUTOF10: 7
PAINLEVEL_OUTOF10: 8
PAINLEVEL_OUTOF10: 7
PAINLEVEL_OUTOF10: 8

## 2024-07-23 ASSESSMENT — PAIN - FUNCTIONAL ASSESSMENT
PAIN_FUNCTIONAL_ASSESSMENT: ACTIVITIES ARE NOT PREVENTED
PAIN_FUNCTIONAL_ASSESSMENT: PREVENTS OR INTERFERES WITH ALL ACTIVE AND SOME PASSIVE ACTIVITIES
PAIN_FUNCTIONAL_ASSESSMENT: PREVENTS OR INTERFERES SOME ACTIVE ACTIVITIES AND ADLS
PAIN_FUNCTIONAL_ASSESSMENT: PREVENTS OR INTERFERES SOME ACTIVE ACTIVITIES AND ADLS
PAIN_FUNCTIONAL_ASSESSMENT: ACTIVITIES ARE NOT PREVENTED

## 2024-07-23 ASSESSMENT — PAIN DESCRIPTION - ORIENTATION
ORIENTATION: LOWER
ORIENTATION: LOWER;MID
ORIENTATION: LOWER

## 2024-07-23 ASSESSMENT — PAIN DESCRIPTION - ONSET
ONSET: ON-GOING
ONSET: ON-GOING

## 2024-07-23 ASSESSMENT — PAIN DESCRIPTION - PAIN TYPE
TYPE: CHRONIC PAIN
TYPE: ACUTE PAIN

## 2024-07-23 ASSESSMENT — PAIN SCALES - WONG BAKER: WONGBAKER_NUMERICALRESPONSE: NO HURT

## 2024-07-23 ASSESSMENT — PAIN DESCRIPTION - FREQUENCY
FREQUENCY: CONTINUOUS
FREQUENCY: CONTINUOUS

## 2024-07-23 NOTE — PROGRESS NOTES
Pain Management   Progress Note      7/23/2024 2:04 PM     Chief Complaint: Low back pain, right leg pain     SUBJECTIVE:    Jonathan was seen in his room, upon my arrival patient was sleeping and woken for evaluation.  He appeared very comfortable without any visual distress or discomfort today.  He states that pain is much better today and rated at 2 out of 10  \"I don't have much pain at this time. He continues to have pain in his right low back- sharp with movement.  States that he continues to have tingling and right lower extremity.  He has used 3 doses of prn oxy IR in the past 24 hours and feels that this medication is really helping. Has not used any prn Voltaren gel, tylenol, or flexeril. He refused his Neurontin today and when asked about this he states he did not know he refused it and states maybe they caught him when he woke up.   Reports that he slept well last night and that he has been able to rest comfortably  Reviewed ortho spine notes and no plan for surgical intervention   Medications effective:  yes  Pain rating is \"2/10\" right low back   Constipation: Patient states that he had a bowel movement yesterday but none are documented.     Current medications:    cefepime  2,000 mg IntraVENous Q8H    gabapentin  200 mg Oral TID    senna  2 tablet Oral Nightly    docusate sodium  100 mg Oral BID    dexAMETHasone  6 mg IntraVENous Q6H    sodium chloride flush  5-40 mL IntraVENous 2 times per day    [Held by provider] enoxaparin  30 mg SubCUTAneous BID                                        naloxone, oxyCODONE **OR** oxyCODONE, diclofenac sodium, cyclobenzaprine, LORazepam, sodium chloride flush, sodium chloride, potassium chloride **OR** potassium alternative oral replacement **OR** potassium chloride, magnesium sulfate, ondansetron **OR** ondansetron, polyethylene glycol, acetaminophen **OR** acetaminophen                                     @[employer]FUSIONS          Review of Systems:  CONSTITUTIONAL:

## 2024-07-23 NOTE — PROGRESS NOTES
Hospitalist Progress Note  Internal Medicine Resident      Patient: Jonathan Goldsmith 49 y.o. male      Unit/Bed: K-23/023-A    Admit Date: 7/18/2024      ASSESSMENT AND PLAN  Active Problems  Suspected osteomyelitis: Patient has history of lumbar laminectomy of L5-S1 in 2016.  Patient had epidural abscess and discitis in 2017.  Patient had lumbar decompression, I&D in 2017.  Patient has had osteomyelitis of the spine in the past. MRI of the lumbar spine showed abnormal appearance at L2-L3 level.  There is edema and enhancement at this level.  Possible edema extending into the left psoas muscle. Infection at the L2-3 level is also a consideration.  Repeat MRI of the lumbar spine w/wo contrast in 48 hours may be beneficial.  Patient having lumbar back pain radiating down right leg.  Patient had 1 fever episode 7/20 at 101.9, resolved with Tylenol. MRSA screen negative. Patient could not complete biopsy due to pain.  Blood cultures x 2 no growth. Patient waiting placement for [place]  Continue IV cefepime for 6 weeks  Continue Decadron 6 mg every 6 hours for 2 days  Voltaren gel every 4 hours as needed added  Pain management consulted, appreciate recommendations  No surgical plans by Ortho at this time  Tylenol as needed for fevers  Daily CBC, BMP  Infectious disease consulted, appreciate recommendations  Right testicular pain, improved: Patient states pain feels like it is radiating in his low back to his right testicle.  Ultrasound of the scrotum and testicles was normal, showed no evidence of torsion  Continue gabapentin to 200 mg 3 times daily  Hx of IV drug use: Patient has history of IV drug abuse.  States he used drugs 2 days before admission  Urine drug screen positive for methamphetamine, fentanyl, benzodiazepines  Hypertension, stable: Patient has diagnosis of hypertension, denies use of any hypertensive medications.  Had 1 reading of elevated BP at 141/86 in ED 7/18/24.  Blood pressure stable today at

## 2024-07-23 NOTE — PROGRESS NOTES
Department of Orthopedic Surgery  Spine Service  Attending Progress Note        Subjective:  still with back and leg pain but better controlled. IR unable to complete biopsy    Vitals  VITALS:  /68   Pulse 66   Temp 97.9 °F (36.6 °C) (Oral)   Resp 16   Ht 1.88 m (6' 2\")   Wt 104.3 kg (230 lb)   SpO2 97%   BMI 29.53 kg/m²   24HR INTAKE/OUTPUT:    Intake/Output Summary (Last 24 hours) at 7/23/2024 1027  Last data filed at 7/23/2024 0127  Gross per 24 hour   Intake 330 ml   Output 1250 ml   Net -920 ml     URINARY CATHETER OUTPUT (Marquez):     DRAIN/TUBE OUTPUT:         PHYSICAL EXAM:    Orientation:  alert and oriented to person, place and time    Lower Extremity Motor :  quadriceps, extensor hallucis longus, dorsiflexion, plantarflexion 5/5 bilaterally  Lower Extremity Sensory:  Intact L1-S1        LABS:    HgB:    Lab Results   Component Value Date/Time    HGB 14.3 07/23/2024 07:34 AM     ASSESSMENT AND PLAN:    1) Prior L3-S1 posterior spinal fusion for osteomyelitis/epidural abscess in 2017  2) ? L2-3 discitis  3) Low back pain  4) IVDA    1:  Antibiotics per ID. Plan for PICC line placement  2:  No acute surgical plans at this time  3:  Follow up with Dr. Gould 3 weeks in office    EJ Mccracken

## 2024-07-23 NOTE — PROGRESS NOTES
Physician Progress Note      PATIENT:               MOY GREWAL  CSN #:                  [id number]  :                       1975  ADMIT DATE:       2024 9:01 AM  DISCH DATE:  RESPONDING  PROVIDER #:        ASHLY BIRCH          QUERY TEXT:    Pt presented with c/o low back pain.  Documentation of concern for   osteomyelitis. Pt noted to have elevated WBC, increased temperature with   tachycardia. If possible, please document in the progress notes and discharge   summary if you are evaluating and /or treating any of the following:    The medical record reflects the following:  Risk Factors: 50yo with hx IVDU, prior back surgery  Clinical Indicators: WBC 12.3, 12.5. CRP 3.95, 20.94. Temperature of 101.2   with .  Treatment: Rocephin. Vancomycin. Tylenol.  Options provided:  -- Sepsis, present on admission  -- No sepsis  -- Other - I will add my own diagnosis  -- Disagree - Not applicable / Not valid  -- Disagree - Clinically unable to determine / Unknown  -- Refer to Clinical Documentation Reviewer    PROVIDER RESPONSE TEXT:    No sepsis    Query created by: DANA GONGORA on 2024 10:25 AM      Electronically signed by:  ASHLY BIRCH 2024 8:41 AM

## 2024-07-23 NOTE — PROGRESS NOTES
ID Progress Note    Patient - Jonathan Goldsmith,  Age - 49 y.o.    - 1975      Room Number - 7K-23/023-A   MRN -  [id number]   [id number]  Date of Admission -  2024  9:01 AM    SUBJECTIVE:   No new complaints today, pain under control    OBJECTIVE   VITALS    height is 1.88 m (6' 2\") and weight is 104.3 kg (230 lb). His oral temperature is 97.9 °F (36.6 °C). His blood pressure is 112/68 and his pulse is 66. His respiration is 16 and oxygen saturation is 97%.       Wt Readings from Last 3 Encounters:   24 104.3 kg (230 lb)   24 104.3 kg (230 lb)   17 108.9 kg (240 lb)       I/O (24 Hours)    Intake/Output Summary (Last 24 hours) at 2024 0816  Last data filed at 2024 0127  Gross per 24 hour   Intake 330 ml   Output 1500 ml   Net -1170 ml         General Appearance  Awake, alert, oriented,  not  In acute distress  HEENT - normocephalic, atraumatic, pink conjunctiva,  anicteric sclera  Neck - Supple, no mass  Lungs -  Bilateral good air entry, no rhonchi, no wheeze  Cardiovascular - Heart sounds are normal.  Regular rate and rhythm without murmur, gallop or rub.  Abdomen - soft, not distended, nontender, no organomegally,  Neurologic -cranial nerves grossly intact  Skin - No bruising or bleeding  Extremities - No edema, no cyanosis, clubbing     MEDICATIONS:      cefepime  2,000 mg IntraVENous Q8H    gabapentin  200 mg Oral TID    senna  2 tablet Oral Nightly    docusate sodium  100 mg Oral BID    dexAMETHasone  6 mg IntraVENous Q6H    sodium chloride flush  5-40 mL IntraVENous 2 times per day    [Held by provider] enoxaparin  30 mg SubCUTAneous BID      sodium chloride 20 mL/hr at 24 0144     naloxone, oxyCODONE **OR** oxyCODONE, diclofenac sodium, cyclobenzaprine, LORazepam, sodium chloride flush, sodium chloride, potassium chloride **OR** potassium alternative oral replacement **OR** potassium chloride, magnesium sulfate, ondansetron **OR** ondansetron,  negative  Patient was started on cefepime 7/22/2024, will continue IV antibiotics for 6 week duration and plan to place PICC line  Pain management following  History of IV drug abuse    Leandro Richard MD,  7/23/2024 8:16 AM    Patient was seen and examined face-to-face by me  The chart, progress notes, labs and radiographs were reviewed.    Questions and concerns were addressed.  I agree with the progress note.will arrange PICC line and find a placement for iv antibiotics

## 2024-07-23 NOTE — PLAN OF CARE
Problem: Discharge Planning  Goal: Discharge to home or other facility with appropriate resources  Outcome: Progressing  Flowsheets (Taken 7/23/2024 0812)  Discharge to home or other facility with appropriate resources: Identify barriers to discharge with patient and caregiver     Problem: Pain  Goal: Verbalizes/displays adequate comfort level or baseline comfort level  Outcome: Progressing  Flowsheets (Taken 7/22/2024 2351 by Bernadette Sanchez RN)  Verbalizes/displays adequate comfort level or baseline comfort level: Assess pain using appropriate pain scale     Problem: Safety - Adult  Goal: Free from fall injury  Outcome: Progressing     Problem: Skin/Tissue Integrity - Adult  Goal: Skin integrity remains intact  Outcome: Progressing  Flowsheets (Taken 7/23/2024 0812)  Skin Integrity Remains Intact: Monitor for areas of redness and/or skin breakdown     Problem: Hematologic - Adult  Goal: Maintains hematologic stability  Outcome: Progressing  Flowsheets (Taken 7/23/2024 0812)  Maintains hematologic stability: Assess for signs and symptoms of bleeding or hemorrhage     Problem: Skin/Tissue Integrity  Goal: Absence of new skin breakdown  Description: 1.  Monitor for areas of redness and/or skin breakdown  2.  Assess vascular access sites hourly  3.  Every 4-6 hours minimum:  Change oxygen saturation probe site  4.  Every 4-6 hours:  If on nasal continuous positive airway pressure, respiratory therapy assess nares and determine need for appliance change or resting period.  Outcome: Progressing     Problem: Gastrointestinal - Adult  Goal: Maintains or returns to baseline bowel function  Outcome: Progressing  Flowsheets (Taken 7/23/2024 0812)  Maintains or returns to baseline bowel function: Assess bowel function     Problem: Infection - Adult  Goal: Absence of infection at discharge  Outcome: Progressing  Flowsheets (Taken 7/23/2024 0812)  Absence of infection at discharge: Assess and monitor for signs and symptoms of

## 2024-07-24 ENCOUNTER — HOSPITAL ENCOUNTER (OUTPATIENT)
Age: 49
Discharge: HOME | End: 2024-08-07
Attending: STUDENT IN AN ORGANIZED HEALTH CARE EDUCATION/TRAINING PROGRAM | Admitting: STUDENT IN AN ORGANIZED HEALTH CARE EDUCATION/TRAINING PROGRAM
Payer: COMMERCIAL

## 2024-07-24 VITALS
RESPIRATION RATE: 18 BRPM | TEMPERATURE: 97.7 F | WEIGHT: 230 LBS | HEIGHT: 74 IN | OXYGEN SATURATION: 97 % | SYSTOLIC BLOOD PRESSURE: 125 MMHG | HEART RATE: 64 BPM | BODY MASS INDEX: 29.52 KG/M2 | DIASTOLIC BLOOD PRESSURE: 70 MMHG

## 2024-07-24 LAB
ANION GAP SERPL CALC-SCNC: 10 MEQ/L (ref 8–16)
BASOPHILS ABSOLUTE: 0 THOU/MM3 (ref 0–0.1)
BASOPHILS NFR BLD AUTO: 0.1 %
BUN SERPL-MCNC: 23 MG/DL (ref 7–22)
CALCIUM SERPL-MCNC: 8.6 MG/DL (ref 8.5–10.5)
CHLORIDE SERPL-SCNC: 103 MEQ/L (ref 98–111)
CO2 SERPL-SCNC: 24 MEQ/L (ref 23–33)
CREAT SERPL-MCNC: 0.7 MG/DL (ref 0.4–1.2)
DEPRECATED RDW RBC AUTO: 39.8 FL (ref 35–45)
EOSINOPHIL NFR BLD AUTO: 0 %
EOSINOPHILS ABSOLUTE: 0 THOU/MM3 (ref 0–0.4)
ERYTHROCYTE [DISTWIDTH] IN BLOOD BY AUTOMATED COUNT: 13.2 % (ref 11.5–14.5)
GFR SERPL CREATININE-BSD FRML MDRD: > 90 ML/MIN/1.73M2
GLUCOSE SERPL-MCNC: 124 MG/DL (ref 70–108)
HCT VFR BLD AUTO: 40.4 % (ref 42–52)
HGB BLD-MCNC: 13.1 GM/DL (ref 14–18)
IMM GRANULOCYTES # BLD AUTO: 0.1 THOU/MM3 (ref 0–0.07)
IMM GRANULOCYTES NFR BLD AUTO: 0.6 %
LYMPHOCYTES ABSOLUTE: 1.3 THOU/MM3 (ref 1–4.8)
LYMPHOCYTES NFR BLD AUTO: 7.5 %
MCH RBC QN AUTO: 26.9 PG (ref 26–33)
MCHC RBC AUTO-ENTMCNC: 32.4 GM/DL (ref 32.2–35.5)
MCV RBC AUTO: 83 FL (ref 80–94)
MONOCYTES ABSOLUTE: 0.8 THOU/MM3 (ref 0.4–1.3)
MONOCYTES NFR BLD AUTO: 4.5 %
NEUTROPHILS ABSOLUTE: 14.8 THOU/MM3 (ref 1.8–7.7)
NEUTROPHILS NFR BLD AUTO: 87.3 %
NRBC BLD AUTO-RTO: 0 /100 WBC
PLATELET # BLD AUTO: 372 THOU/MM3 (ref 130–400)
PMV BLD AUTO: 9.6 FL (ref 9.4–12.4)
POTASSIUM SERPL-SCNC: 4.7 MEQ/L (ref 3.5–5.2)
RBC # BLD AUTO: 4.87 MILL/MM3 (ref 4.7–6.1)
SODIUM SERPL-SCNC: 137 MEQ/L (ref 135–145)
WBC # BLD AUTO: 16.9 THOU/MM3 (ref 4.8–10.8)

## 2024-07-24 PROCEDURE — 6370000000 HC RX 637 (ALT 250 FOR IP): Performed by: NURSE PRACTITIONER

## 2024-07-24 PROCEDURE — 99232 SBSQ HOSP IP/OBS MODERATE 35: CPT | Performed by: NURSE PRACTITIONER

## 2024-07-24 PROCEDURE — 85025 COMPLETE CBC W/AUTO DIFF WBC: CPT

## 2024-07-24 PROCEDURE — 6360000002 HC RX W HCPCS: Performed by: INTERNAL MEDICINE

## 2024-07-24 PROCEDURE — 6360000002 HC RX W HCPCS

## 2024-07-24 PROCEDURE — 36415 COLL VENOUS BLD VENIPUNCTURE: CPT

## 2024-07-24 PROCEDURE — 2580000003 HC RX 258

## 2024-07-24 PROCEDURE — 80048 BASIC METABOLIC PNL TOTAL CA: CPT

## 2024-07-24 PROCEDURE — 99239 HOSP IP/OBS DSCHRG MGMT >30: CPT | Performed by: STUDENT IN AN ORGANIZED HEALTH CARE EDUCATION/TRAINING PROGRAM

## 2024-07-24 PROCEDURE — 2580000003 HC RX 258: Performed by: INTERNAL MEDICINE

## 2024-07-24 RX ORDER — ACETAMINOPHEN 325 MG/1
650 TABLET ORAL EVERY 6 HOURS PRN
OUTPATIENT
Start: 2024-07-24

## 2024-07-24 RX ORDER — LORAZEPAM 2 MG/ML
1 INJECTION INTRAMUSCULAR EVERY 6 HOURS PRN
OUTPATIENT
Start: 2024-07-24

## 2024-07-24 RX ORDER — SODIUM CHLORIDE 0.9 % (FLUSH) 0.9 %
5-40 SYRINGE (ML) INJECTION EVERY 12 HOURS SCHEDULED
OUTPATIENT
Start: 2024-07-24

## 2024-07-24 RX ORDER — ONDANSETRON 2 MG/ML
4 INJECTION INTRAMUSCULAR; INTRAVENOUS EVERY 6 HOURS PRN
OUTPATIENT
Start: 2024-07-24

## 2024-07-24 RX ORDER — ENOXAPARIN SODIUM 100 MG/ML
30 INJECTION SUBCUTANEOUS 2 TIMES DAILY
Status: ON HOLD | DISCHARGE
Start: 2024-07-24

## 2024-07-24 RX ORDER — PSEUDOEPHEDRINE HCL 30 MG
100 TABLET ORAL 2 TIMES DAILY
Status: ON HOLD | DISCHARGE
Start: 2024-07-24

## 2024-07-24 RX ORDER — POLYETHYLENE GLYCOL 3350 17 G/17G
17 POWDER, FOR SOLUTION ORAL DAILY PRN
OUTPATIENT
Start: 2024-07-24

## 2024-07-24 RX ORDER — MAGNESIUM SULFATE IN WATER 40 MG/ML
2000 INJECTION, SOLUTION INTRAVENOUS PRN
OUTPATIENT
Start: 2024-07-24

## 2024-07-24 RX ORDER — POTASSIUM CHLORIDE 7.45 MG/ML
10 INJECTION INTRAVENOUS PRN
OUTPATIENT
Start: 2024-07-24

## 2024-07-24 RX ORDER — NALOXONE HYDROCHLORIDE 0.4 MG/ML
0.4 INJECTION, SOLUTION INTRAMUSCULAR; INTRAVENOUS; SUBCUTANEOUS PRN
Refills: 0 | Status: ON HOLD | DISCHARGE
Start: 2024-07-24

## 2024-07-24 RX ORDER — DEXAMETHASONE SODIUM PHOSPHATE 4 MG/ML
6 INJECTION, SOLUTION INTRA-ARTICULAR; INTRALESIONAL; INTRAMUSCULAR; INTRAVENOUS; SOFT TISSUE EVERY 6 HOURS
Qty: 8 ML | Refills: 0 | Status: ON HOLD | DISCHARGE
Start: 2024-07-24 | End: 2024-07-25

## 2024-07-24 RX ORDER — SODIUM CHLORIDE 0.9 % (FLUSH) 0.9 %
5-40 SYRINGE (ML) INJECTION PRN
Status: DISCONTINUED | OUTPATIENT
Start: 2024-07-24 | End: 2024-07-24 | Stop reason: HOSPADM

## 2024-07-24 RX ORDER — ENOXAPARIN SODIUM 100 MG/ML
30 INJECTION SUBCUTANEOUS 2 TIMES DAILY
OUTPATIENT
Start: 2024-07-24

## 2024-07-24 RX ORDER — OXYCODONE HYDROCHLORIDE 5 MG/1
5 TABLET ORAL EVERY 4 HOURS PRN
OUTPATIENT
Start: 2024-07-24

## 2024-07-24 RX ORDER — SODIUM CHLORIDE 0.9 % (FLUSH) 0.9 %
5-40 SYRINGE (ML) INJECTION PRN
OUTPATIENT
Start: 2024-07-24

## 2024-07-24 RX ORDER — CYCLOBENZAPRINE HCL 10 MG
10 TABLET ORAL 3 TIMES DAILY PRN
OUTPATIENT
Start: 2024-07-24

## 2024-07-24 RX ORDER — CYCLOBENZAPRINE HCL 10 MG
10 TABLET ORAL 3 TIMES DAILY PRN
Status: ON HOLD | DISCHARGE
Start: 2024-07-24 | End: 2024-08-03

## 2024-07-24 RX ORDER — DEXAMETHASONE SODIUM PHOSPHATE 4 MG/ML
6 INJECTION, SOLUTION INTRA-ARTICULAR; INTRALESIONAL; INTRAMUSCULAR; INTRAVENOUS; SOFT TISSUE EVERY 6 HOURS
OUTPATIENT
Start: 2024-07-24 | End: 2024-07-25

## 2024-07-24 RX ORDER — GABAPENTIN 100 MG/1
200 CAPSULE ORAL 3 TIMES DAILY
OUTPATIENT
Start: 2024-07-24

## 2024-07-24 RX ORDER — SENNOSIDES A AND B 8.6 MG/1
2 TABLET, FILM COATED ORAL NIGHTLY
OUTPATIENT
Start: 2024-07-24

## 2024-07-24 RX ORDER — DOCUSATE SODIUM 100 MG/1
100 CAPSULE, LIQUID FILLED ORAL 2 TIMES DAILY
OUTPATIENT
Start: 2024-07-24

## 2024-07-24 RX ORDER — OXYCODONE HYDROCHLORIDE 5 MG/1
10 TABLET ORAL EVERY 4 HOURS PRN
OUTPATIENT
Start: 2024-07-24

## 2024-07-24 RX ORDER — SODIUM CHLORIDE 9 MG/ML
INJECTION, SOLUTION INTRAVENOUS PRN
Status: DISCONTINUED | OUTPATIENT
Start: 2024-07-24 | End: 2024-07-24 | Stop reason: HOSPADM

## 2024-07-24 RX ORDER — OXYCODONE HYDROCHLORIDE 5 MG/1
5 TABLET ORAL EVERY 4 HOURS PRN
Refills: 0 | Status: ON HOLD | DISCHARGE
Start: 2024-07-24 | End: 2024-07-27

## 2024-07-24 RX ORDER — SODIUM CHLORIDE 0.9 % (FLUSH) 0.9 %
5-40 SYRINGE (ML) INJECTION EVERY 12 HOURS SCHEDULED
Status: DISCONTINUED | OUTPATIENT
Start: 2024-07-24 | End: 2024-07-24 | Stop reason: HOSPADM

## 2024-07-24 RX ORDER — POTASSIUM CHLORIDE 20 MEQ/1
40 TABLET, EXTENDED RELEASE ORAL PRN
OUTPATIENT
Start: 2024-07-24

## 2024-07-24 RX ORDER — ONDANSETRON 4 MG/1
4 TABLET, ORALLY DISINTEGRATING ORAL EVERY 8 HOURS PRN
OUTPATIENT
Start: 2024-07-24

## 2024-07-24 RX ORDER — NALOXONE HYDROCHLORIDE 0.4 MG/ML
0.4 INJECTION, SOLUTION INTRAMUSCULAR; INTRAVENOUS; SUBCUTANEOUS PRN
OUTPATIENT
Start: 2024-07-24

## 2024-07-24 RX ORDER — GABAPENTIN 100 MG/1
200 CAPSULE ORAL 3 TIMES DAILY
Qty: 90 CAPSULE | Refills: 0 | Status: ON HOLD | DISCHARGE
Start: 2024-07-24 | End: 2024-08-23

## 2024-07-24 RX ORDER — ACETAMINOPHEN 650 MG/1
650 SUPPOSITORY RECTAL EVERY 6 HOURS PRN
OUTPATIENT
Start: 2024-07-24

## 2024-07-24 RX ORDER — SENNOSIDES A AND B 8.6 MG/1
2 TABLET, FILM COATED ORAL NIGHTLY
Qty: 60 TABLET | Refills: 0 | Status: ON HOLD | DISCHARGE
Start: 2024-07-24 | End: 2024-08-23

## 2024-07-24 RX ADMIN — DEXAMETHASONE SODIUM PHOSPHATE 6 MG: 4 INJECTION, SOLUTION INTRA-ARTICULAR; INTRALESIONAL; INTRAMUSCULAR; INTRAVENOUS; SOFT TISSUE at 13:57

## 2024-07-24 RX ADMIN — DEXAMETHASONE SODIUM PHOSPHATE 6 MG: 4 INJECTION, SOLUTION INTRA-ARTICULAR; INTRALESIONAL; INTRAMUSCULAR; INTRAVENOUS; SOFT TISSUE at 08:26

## 2024-07-24 RX ADMIN — ENOXAPARIN SODIUM 30 MG: 100 INJECTION SUBCUTANEOUS at 08:26

## 2024-07-24 RX ADMIN — SODIUM CHLORIDE, PRESERVATIVE FREE 10 ML: 5 INJECTION INTRAVENOUS at 08:28

## 2024-07-24 RX ADMIN — OXYCODONE 10 MG: 5 TABLET ORAL at 12:50

## 2024-07-24 RX ADMIN — CEFEPIME 2000 MG: 2 INJECTION, POWDER, FOR SOLUTION INTRAVENOUS at 13:18

## 2024-07-24 RX ADMIN — DEXAMETHASONE SODIUM PHOSPHATE 6 MG: 4 INJECTION, SOLUTION INTRA-ARTICULAR; INTRALESIONAL; INTRAMUSCULAR; INTRAVENOUS; SOFT TISSUE at 03:02

## 2024-07-24 RX ADMIN — GABAPENTIN 200 MG: 100 CAPSULE ORAL at 08:27

## 2024-07-24 RX ADMIN — OXYCODONE 10 MG: 5 TABLET ORAL at 03:02

## 2024-07-24 RX ADMIN — GABAPENTIN 200 MG: 100 CAPSULE ORAL at 13:57

## 2024-07-24 RX ADMIN — OXYCODONE 10 MG: 5 TABLET ORAL at 08:24

## 2024-07-24 RX ADMIN — CEFEPIME 2000 MG: 2 INJECTION, POWDER, FOR SOLUTION INTRAVENOUS at 05:14

## 2024-07-24 RX ADMIN — OXYCODONE 10 MG: 5 TABLET ORAL at 16:54

## 2024-07-24 ASSESSMENT — PAIN - FUNCTIONAL ASSESSMENT
PAIN_FUNCTIONAL_ASSESSMENT: PREVENTS OR INTERFERES SOME ACTIVE ACTIVITIES AND ADLS
PAIN_FUNCTIONAL_ASSESSMENT: ACTIVITIES ARE NOT PREVENTED

## 2024-07-24 ASSESSMENT — PAIN SCALES - GENERAL
PAINLEVEL_OUTOF10: 7
PAINLEVEL_OUTOF10: 7
PAINLEVEL_OUTOF10: 8
PAINLEVEL_OUTOF10: 5
PAINLEVEL_OUTOF10: 7
PAINLEVEL_OUTOF10: 6
PAINLEVEL_OUTOF10: 5
PAINLEVEL_OUTOF10: 7

## 2024-07-24 ASSESSMENT — PAIN DESCRIPTION - ORIENTATION
ORIENTATION: LOWER;MID
ORIENTATION: LOWER

## 2024-07-24 ASSESSMENT — PAIN DESCRIPTION - LOCATION
LOCATION: BACK

## 2024-07-24 ASSESSMENT — PAIN DESCRIPTION - DESCRIPTORS
DESCRIPTORS: ACHING
DESCRIPTORS: ACHING;CRAMPING
DESCRIPTORS: ACHING;DISCOMFORT
DESCRIPTORS: ACHING

## 2024-07-24 NOTE — PLAN OF CARE
Problem: Discharge Planning  Goal: Discharge to home or other facility with appropriate resources  7/23/2024 2247 by Toyin Manuel, RN  Outcome: Progressing  Flowsheets (Taken 7/23/2024 2247)  Discharge to home or other facility with appropriate resources:   Identify barriers to discharge with patient and caregiver   Arrange for needed discharge resources and transportation as appropriate   Identify discharge learning needs (meds, wound care, etc)     Problem: Pain  Goal: Verbalizes/displays adequate comfort level or baseline comfort level  7/23/2024 2247 by Toyin Manuel, RN  Outcome: Progressing  Flowsheets (Taken 7/23/2024 2247)  Verbalizes/displays adequate comfort level or baseline comfort level:   Encourage patient to monitor pain and request assistance   Assess pain using appropriate pain scale   Administer analgesics based on type and severity of pain and evaluate response   Implement non-pharmacological measures as appropriate and evaluate response     Problem: Safety - Adult  Goal: Free from fall injury  7/23/2024 2247 by Toyin Manuel, RN  Outcome: Progressing  Flowsheets  Taken 7/23/2024 2247  Free From Fall Injury: Instruct family/caregiver on patient safety  Taken 7/23/2024 2242  Free From Fall Injury: Instruct family/caregiver on patient safety     Problem: Skin/Tissue Integrity - Adult  Goal: Skin integrity remains intact  7/23/2024 2247 by Toyin Manuel, RN  Outcome: Progressing  Flowsheets  Taken 7/23/2024 2247  Skin Integrity Remains Intact:   Monitor for areas of redness and/or skin breakdown   Assess vascular access sites hourly  Taken 7/23/2024 2243  Skin Integrity Remains Intact:   Monitor for areas of redness and/or skin breakdown   Assess vascular access sites hourly     Problem: Musculoskeletal - Adult  Goal: Return mobility to safest level of function  Outcome: Progressing  Flowsheets (Taken 7/23/2024 2247)  Return Mobility to Safest Level of Function:   Assess patient stability

## 2024-07-24 NOTE — PROGRESS NOTES
Pain Management   Progress Note      7/24/2024 4:37 PM     Chief Complaint: Low back pain, right leg pain     SUBJECTIVE:    Patient seen in his room resting comfortably in bed again without any visual distress. Continues to have pain in right lower back with tingling in right foot. States pain is controlled and tolerable with current pain medications. States Oxy IR is very effective. States he has been able to move better and much more.   He has used 4 doses of prn oxy IR in the past 24 hours. Has not used any prn Voltaren gel, tylenol, or flexeril.   States that leg pain is improved with Neurontin   Awaiting admission to [facility]. Denies any other new needs or concerns today   Medications effective:  yes  Pain rating is 5/10 right low back       Current medications:    cefepime  2,000 mg IntraVENous Q8H    gabapentin  200 mg Oral TID    senna  2 tablet Oral Nightly    docusate sodium  100 mg Oral BID    dexAMETHasone  6 mg IntraVENous Q6H    sodium chloride flush  5-40 mL IntraVENous 2 times per day    enoxaparin  30 mg SubCUTAneous BID                                        naloxone, oxyCODONE **OR** oxyCODONE, diclofenac sodium, cyclobenzaprine, LORazepam, sodium chloride flush, sodium chloride, potassium chloride **OR** potassium alternative oral replacement **OR** potassium chloride, magnesium sulfate, ondansetron **OR** ondansetron, polyethylene glycol, acetaminophen **OR** acetaminophen                                     @MEDSINFUSIONS          Review of Systems:  CONSTITUTIONAL:  negative  EYES:  negative  HEENT:  negative  RESPIRATORY:  negative  CARDIOVASCULAR:  negative  GASTROINTESTINAL:  + BM 7/23/2024 per patient   GENITOURINARY:  negative  SKIN:  negative  HEMATOLOGIC/LYMPHATIC:  negative  MUSCULOSKELETAL:  positive for  myalgias, arthralgias, pain, decreased range of motion, muscle weakness, and bone pain  NEUROLOGICAL:  positive for gait problems,  and pain  BEHAVIOR/PSYCH:  negative   System  medications as pain remains tolerable.   Continue tylenol 650 mg every 6 hours as needed for mild pain of 1-3/10, oxy IR 5 mg to 10 mg every 4 hours as needed for pain while in the hospital. 5 mg for moderate pain (4-6/10), 10 mg for severe pain (7-10/10). Hold for oversedation, confusion, SBP<100, PO2<90%   Continue Narcan prn opioid reversal  Continue Voltaren gel QID prn to painful areas, Neurontin 200 mg scheduled TID for nerve pain, flexeril 10 mg TID prn, educated on pain management alternatives, such as; repositioning, distraction, meditation, ice, heat  Continue bowel medications- colace 100 mg BID, senna 2 tabs at bedtime   Awaiting disposition to [facility] for continued IV antibiotic therapy  Will continue to follow while in the acute hospital      Sabas Chauhan, PRATIBHA - CNP, 7/24/2024, 4:37 PM

## 2024-07-24 NOTE — PLAN OF CARE
Problem: Discharge Planning  Goal: Discharge to home or other facility with appropriate resources  7/24/2024 1118 by Sonia Tian RN  Outcome: Progressing  Flowsheets (Taken 7/24/2024 0821)  Discharge to home or other facility with appropriate resources: Identify barriers to discharge with patient and caregiver  7/23/2024 2247 by Toyin Manuel, RN  Outcome: Progressing  Flowsheets (Taken 7/23/2024 2247)  Discharge to home or other facility with appropriate resources:   Identify barriers to discharge with patient and caregiver   Arrange for needed discharge resources and transportation as appropriate   Identify discharge learning needs (meds, wound care, etc)     Problem: Pain  Goal: Verbalizes/displays adequate comfort level or baseline comfort level  7/24/2024 1118 by Sonia Tian RN  Outcome: Progressing  Flowsheets (Taken 7/24/2024 0821)  Verbalizes/displays adequate comfort level or baseline comfort level: Encourage patient to monitor pain and request assistance  7/23/2024 2247 by Toyin Manuel, RN  Outcome: Progressing  Flowsheets (Taken 7/23/2024 2247)  Verbalizes/displays adequate comfort level or baseline comfort level:   Encourage patient to monitor pain and request assistance   Assess pain using appropriate pain scale   Administer analgesics based on type and severity of pain and evaluate response   Implement non-pharmacological measures as appropriate and evaluate response     Problem: Safety - Adult  Goal: Free from fall injury  7/24/2024 1118 by Sonia Tian RN  Outcome: Progressing  7/23/2024 2247 by Toyin Manuel RN  Outcome: Progressing  Flowsheets  Taken 7/23/2024 2247  Free From Fall Injury: Instruct family/caregiver on patient safety  Taken 7/23/2024 2242  Free From Fall Injury: Instruct family/caregiver on patient safety     Problem: Skin/Tissue Integrity - Adult  Goal: Skin integrity remains intact  Recent Flowsheet Documentation  Taken 7/24/2024 0821 by Sonia Tian  RN  Skin Integrity Remains Intact: Monitor for areas of redness and/or skin breakdown  7/23/2024 2247 by Toyin Manuel, RN  Outcome: Progressing  Flowsheets  Taken 7/23/2024 2247  Skin Integrity Remains Intact:   Monitor for areas of redness and/or skin breakdown   Assess vascular access sites hourly  Taken 7/23/2024 2243  Skin Integrity Remains Intact:   Monitor for areas of redness and/or skin breakdown   Assess vascular access sites hourly     Problem: Musculoskeletal - Adult  Goal: Return mobility to safest level of function  7/24/2024 1118 by Sonia Tian, RN  Outcome: Progressing  7/23/2024 2247 by Toyin Manuel, RN  Outcome: Progressing  Flowsheets (Taken 7/23/2024 2247)  Return Mobility to Safest Level of Function:   Assess patient stability and activity tolerance for standing, transferring and ambulating with or without assistive devices   Assist with transfers and ambulation using safe patient handling equipment as needed   Ensure adequate protection for wounds/incisions during mobilization   Obtain physical therapy/occupational therapy consults as needed     Problem: Hematologic - Adult  Goal: Maintains hematologic stability  7/24/2024 1118 by Sonia Tian, RN  Outcome: Progressing  Flowsheets (Taken 7/24/2024 0821)  Maintains hematologic stability: Assess for signs and symptoms of bleeding or hemorrhage  7/23/2024 2247 by Toyin Manuel, RN  Outcome: Progressing  Flowsheets (Taken 7/23/2024 2247)  Maintains hematologic stability:   Assess for signs and symptoms of bleeding or hemorrhage   Monitor labs for bleeding or clotting disorders     Problem: Skin/Tissue Integrity  Goal: Absence of new skin breakdown  Description: 1.  Monitor for areas of redness and/or skin breakdown  2.  Assess vascular access sites hourly  3.  Every 4-6 hours minimum:  Change oxygen saturation probe site  4.  Every 4-6 hours:  If on nasal continuous positive airway pressure, respiratory therapy assess nares and

## 2024-07-24 NOTE — PLAN OF CARE
Problem: Discharge Planning  Goal: Discharge to home or other facility with appropriate resources  7/24/2024 1808 by Sonia Tian RN  Outcome: Adequate for Discharge  7/24/2024 1118 by Sonia Tian RN  Outcome: Progressing  Flowsheets (Taken 7/24/2024 0821)  Discharge to home or other facility with appropriate resources: Identify barriers to discharge with patient and caregiver     Problem: Pain  Goal: Verbalizes/displays adequate comfort level or baseline comfort level  7/24/2024 1808 by Sonia Tian RN  Outcome: Adequate for Discharge  7/24/2024 1118 by Sonia Tian RN  Outcome: Progressing  Flowsheets (Taken 7/24/2024 0821)  Verbalizes/displays adequate comfort level or baseline comfort level: Encourage patient to monitor pain and request assistance     Problem: Safety - Adult  Goal: Free from fall injury  7/24/2024 1808 by Sonia Tian RN  Outcome: Adequate for Discharge  7/24/2024 1118 by Sonia Tian RN  Outcome: Progressing     Problem: Skin/Tissue Integrity - Adult  Goal: Skin integrity remains intact  Outcome: Adequate for Discharge  Flowsheets (Taken 7/24/2024 0821)  Skin Integrity Remains Intact: Monitor for areas of redness and/or skin breakdown     Problem: Musculoskeletal - Adult  Goal: Return mobility to safest level of function  7/24/2024 1808 by Sonia Tian RN  Outcome: Adequate for Discharge  7/24/2024 1118 by Sonia Tian RN  Outcome: Progressing     Problem: Hematologic - Adult  Goal: Maintains hematologic stability  7/24/2024 1808 by Sonia Tian RN  Outcome: Adequate for Discharge  7/24/2024 1118 by Sonia Tian RN  Outcome: Progressing  Flowsheets (Taken 7/24/2024 0821)  Maintains hematologic stability: Assess for signs and symptoms of bleeding or hemorrhage     Problem: Skin/Tissue Integrity  Goal: Absence of new skin breakdown  Description: 1.  Monitor for areas of redness and/or skin breakdown  2.  Assess vascular access sites hourly  3.

## 2024-07-24 NOTE — DISCHARGE SUMMARY
Resident Discharge Summary (Hospitalist)      Patient: Jonathan Goldsmith 49 y.o. male  : 1975  MRN: [id number]   Account: [id number]   Patient's PCP: Lan Davenport MD    Admit Date: 2024   Discharge Date:   24    Admitting Physician: Adrian Glasgow MD  Discharge Physician: Kyler Dockery DO       Discharge Diagnoses:  Suspected osteomyelitis of lumbar spine: Patient has history of lumbar laminectomy of L5-S1 in 2016.  Patient had epidural abscess and discitis in 2017.  Patient had lumbar decompression, I&D in 2017.  Patient has had osteomyelitis of the spine in the past. MRI of the lumbar spine showed abnormal appearance at L2-L3 level.  There is edema and enhancement at this level.  Possible edema extending into the left psoas muscle. Infection at the L2-3 level is also a consideration.  Repeat MRI of the lumbar spine w/wo contrast in 48 hours may be beneficial.  Patient having lumbar back pain radiating down right leg.  Patient had 1 fever episode  at 101.9, resolved with Tylenol. MRSA screen negative. Patient could not complete biopsy due to pain.  Blood cultures x 2 no growth.  Continue Decadron 6 mg every 6 hours until end of day 24.  DVT prophylaxis with Lovenox.  Voltaren gel every 4 hours as needed.  Tylenol as needed for fevers added.  Patient will be on IV cefepime for 6 weeks.  Being discharged to [facility]  Right testicular pain, improved: Patient states pain feels like it is radiating in his low back to his right testicle.  Ultrasound of the scrotum and testicles was normal, showed no evidence of torsion.  Continue gabapentin 200 mg 3 times daily on discharge  Hx of IV drug use: Patient has history of IV drug abuse.  States he used drugs 2 days before admission. Urine drug screen positive for methamphetamine, fentanyl, benzodiazepines  Hypertension, stable: Patient has diagnosis of hypertension, denies use of any hypertensive medications.  Had 1 reading of elevated BP at

## 2024-07-24 NOTE — PROGRESS NOTES
ID Progress Note    Patient - Jonathan Goldsmith,  Age - 49 y.o.    - 1975      Room Number - 7K-23/023-A   MRN -  [id number]   [facility] [id number]  Date of Admission -  2024  9:01 AM    SUBJECTIVE:   No new issues.he is feeling better today.    OBJECTIVE   VITALS    height is 1.88 m (6' 2\") and weight is 104.3 kg (230 lb). His oral temperature is 97.7 °F (36.5 °C). His blood pressure is 126/67 and his pulse is 73. His respiration is 18 and oxygen saturation is 97%.       Wt Readings from Last 3 Encounters:   24 104.3 kg (230 lb)   24 104.3 kg (230 lb)   17 108.9 kg (240 lb)       I/O (24 Hours)    Intake/Output Summary (Last 24 hours) at 2024 1520  Last data filed at 2024 1309  Gross per 24 hour   Intake 1250 ml   Output 800 ml   Net 450 ml         General Appearance  Awake, alert, oriented,  not  In acute distress  HEENT - normocephalic, atraumatic, pink conjunctiva,  anicteric sclera  Neck - Supple, no mass  Lungs -  Bilateral   air entry, no rhonchi, no wheeze  Cardiovascular - Heart sounds are normal.     Abdomen - soft, not distended, nontender, no organomegally,  Neurologic -cranial nerves grossly intact  Skin - No bruising or bleeding  Extremities - No edema, no cyanosis, clubbing     MEDICATIONS:      cefepime  2,000 mg IntraVENous Q8H    gabapentin  200 mg Oral TID    senna  2 tablet Oral Nightly    docusate sodium  100 mg Oral BID    dexAMETHasone  6 mg IntraVENous Q6H    sodium chloride flush  5-40 mL IntraVENous 2 times per day    enoxaparin  30 mg SubCUTAneous BID      sodium chloride 20 mL/hr at 24 0144     naloxone, oxyCODONE **OR** oxyCODONE, diclofenac sodium, cyclobenzaprine, LORazepam, sodium chloride flush, sodium chloride, potassium chloride **OR** potassium alternative oral replacement **OR** potassium chloride, magnesium sulfate, ondansetron **OR** ondansetron, polyethylene glycol, acetaminophen **OR** acetaminophen  LABS:   CBC   Recent Labs

## 2024-07-24 NOTE — PROGRESS NOTES
Called report to [facility]. Put in transport for 8:00 pm to [facility]. Waiting on Resident to [facility] to obtain Cefepime Medication. Pt. Will be leaving with IV. Spoke to Jayshree and the Patient will have the PICC placed at [facility].

## 2024-07-24 NOTE — CARE COORDINATION
7/24/24, 2:12 PM EDT    DISCHARGE ON GOING EVALUATION    [facility]       [facility] day: 6  Location: 7-23/023-A Reason for admit: Osteomyelitis (HCC) [M86.9]  Intractable back pain [M54.9]     Procedures:   7/22 IR: Failed spinal biopsy of L2-L3. Patient was unable to tolerate.     Imaging since last note: none    Barriers to Discharge: Hospitalist and ID following. IV cefepime Q24 x 6 weeks (started 7/22). IV Decadron Q6. PRN Flexeril. PRN [place].    PCP: Lan Davenport MD  Readmission Risk Score: 9.1    Patient Goals/Plan/Treatment Preferences: From home alone. [facility] referral has been submitted to [place] and precert is pending.

## 2024-07-24 NOTE — PROGRESS NOTES
his medications from discharge from [facility].  MRI of the lumbar spine showed abnormal appearance at L2-L3 level.  There is edema and enhancement at this level.  Possible edema extending into the left psoas muscle.  Infection at the L2-3 level is also a consideration.  Repeat MRI of the lumbar spine w/wo contrast in 48 hours may be beneficial.  Vitals in ED were RR 18, HR 83, /86.  Patient was given Toradol, morphine, Norflex in the ED.\"    Medications:    Infusion Medications    sodium chloride 20 mL/hr at 07/23/24 0144    Scheduled Medications    cefepime  2,000 mg IntraVENous Q8H    gabapentin  200 mg Oral TID    senna  2 tablet Oral Nightly    docusate sodium  100 mg Oral BID    dexAMETHasone  6 mg IntraVENous Q6H    sodium chloride flush  5-40 mL IntraVENous 2 times per day    enoxaparin  30 mg SubCUTAneous BID    PRN Meds: naloxone, oxyCODONE **OR** oxyCODONE, diclofenac sodium, cyclobenzaprine, LORazepam, sodium chloride flush, sodium chloride, potassium chloride **OR** potassium alternative oral replacement **OR** potassium chloride, magnesium sulfate, ondansetron **OR** ondansetron, polyethylene glycol, acetaminophen **OR** acetaminophen    Exam:  /67   Pulse 73   Temp 97.7 °F (36.5 °C) (Oral)   Resp 18   Ht 1.88 m (6' 2\")   Wt 104.3 kg (230 lb)   SpO2 97%   BMI 29.53 kg/m²   General: In pain, controlled, appears stated age.  Eyes:  PERRL. Conjunctivae/corneas clear.  HENT: Head normal appearing. Nares normal. Oral mucosa moist.  Hearing intact.   Neck: Supple, with full range of motion. Trachea midline.  No gross JVD appreciated.  Respiratory:  Normal effort. Clear to auscultation, without rales or wheezes or rhonchi.  Cardiovascular: Normal rate, regular rhythm with normal S1/S2 without murmurs.    No lower extremity edema.   Abdomen: Soft, non-tender, non-distended with normal bowel sounds.  Musculoskeletal: No joint swelling or tenderness. Normal tone. No abnormal movements.

## 2024-07-25 ENCOUNTER — APPOINTMENT (OUTPATIENT)
Dept: GENERAL RADIOLOGY | Age: 49
End: 2024-07-25
Attending: STUDENT IN AN ORGANIZED HEALTH CARE EDUCATION/TRAINING PROGRAM
Payer: COMMERCIAL

## 2024-07-25 LAB
ALBUMIN SERPL BCG-MCNC: 3.3 G/DL (ref 3.5–5.1)
ALP SERPL-CCNC: 92 U/L (ref 38–126)
ALT SERPL W/O P-5'-P-CCNC: 18 U/L (ref 11–66)
ANION GAP SERPL CALC-SCNC: 11 MEQ/L (ref 8–16)
AST SERPL-CCNC: 13 U/L (ref 5–40)
BASOPHILS ABSOLUTE: 0 THOU/MM3 (ref 0–0.1)
BASOPHILS NFR BLD AUTO: 0.2 %
BILIRUB SERPL-MCNC: 0.2 MG/DL (ref 0.3–1.2)
BUN SERPL-MCNC: 24 MG/DL (ref 7–22)
CALCIUM SERPL-MCNC: 8.7 MG/DL (ref 8.5–10.5)
CHLORIDE SERPL-SCNC: 103 MEQ/L (ref 98–111)
CO2 SERPL-SCNC: 24 MEQ/L (ref 23–33)
CREAT SERPL-MCNC: 0.7 MG/DL (ref 0.4–1.2)
EOSINOPHIL NFR BLD AUTO: 0 %
EOSINOPHILS ABSOLUTE: 0 THOU/MM3 (ref 0–0.4)
ERYTHROCYTE [DISTWIDTH] IN BLOOD BY AUTOMATED COUNT: 13.2 % (ref 11.5–14.5)
GFR SERPL CREATININE-BSD FRML MDRD: > 90 ML/MIN/1.73M2
GLUCOSE SERPL-MCNC: 107 MG/DL (ref 70–108)
HCT VFR BLD AUTO: 42.5 % (ref 42–52)
HGB BLD-MCNC: 13.3 GM/DL (ref 14–18)
IMM GRANULOCYTES # BLD AUTO: 0.16 THOU/MM3 (ref 0–0.07)
IMM GRANULOCYTES NFR BLD AUTO: 1.2 %
LYMPHOCYTES ABSOLUTE: 1.4 THOU/MM3 (ref 1–4.8)
LYMPHOCYTES NFR BLD AUTO: 10.5 %
MCH RBC QN AUTO: 26.2 PG (ref 26–33)
MCHC RBC AUTO-ENTMCNC: 31.3 GM/DL (ref 32.2–35.5)
MCV RBC AUTO: 83.8 FL (ref 80–94)
MONOCYTES ABSOLUTE: 0.7 THOU/MM3 (ref 0.4–1.3)
MONOCYTES NFR BLD AUTO: 5.3 %
NEUTROPHILS ABSOLUTE: 11.3 THOU/MM3 (ref 1.8–7.7)
NEUTROPHILS NFR BLD AUTO: 82.8 %
NRBC BLD AUTO-RTO: 0 /100 WBC
PLATELET # BLD AUTO: 369 THOU/MM3 (ref 130–400)
PMV BLD AUTO: 9.2 FL (ref 9.4–12.4)
POTASSIUM SERPL-SCNC: 4.7 MEQ/L (ref 3.5–5.2)
PREALB SERPL-MCNC: 15.3 MG/DL (ref 20–40)
PROT SERPL-MCNC: 6.7 G/DL (ref 6.1–8)
RBC # BLD AUTO: 5.07 MILL/MM3 (ref 4.7–6.1)
SODIUM SERPL-SCNC: 138 MEQ/L (ref 135–145)
WBC # BLD AUTO: 13.6 THOU/MM3 (ref 4.8–10.8)

## 2024-07-25 PROCEDURE — 71045 X-RAY EXAM CHEST 1 VIEW: CPT

## 2024-07-25 NOTE — PROGRESS NOTES
Discharged via bed in no acute distress, with belongings to [facility]. IV access remains in place.

## 2024-07-26 LAB
ANION GAP SERPL CALC-SCNC: 12 MEQ/L (ref 8–16)
BUN SERPL-MCNC: 21 MG/DL (ref 7–22)
CALCIUM SERPL-MCNC: 7.9 MG/DL (ref 8.5–10.5)
CHLORIDE SERPL-SCNC: 105 MEQ/L (ref 98–111)
CO2 SERPL-SCNC: 23 MEQ/L (ref 23–33)
CREAT SERPL-MCNC: 0.6 MG/DL (ref 0.4–1.2)
DEPRECATED RDW RBC AUTO: 41 FL (ref 35–45)
ERYTHROCYTE [DISTWIDTH] IN BLOOD BY AUTOMATED COUNT: 13.2 % (ref 11.5–14.5)
GFR SERPL CREATININE-BSD FRML MDRD: > 90 ML/MIN/1.73M2
GLUCOSE SERPL-MCNC: 83 MG/DL (ref 70–108)
HCT VFR BLD AUTO: 42.6 % (ref 42–52)
HGB BLD-MCNC: 13.4 GM/DL (ref 14–18)
MCH RBC QN AUTO: 26.6 PG (ref 26–33)
MCHC RBC AUTO-ENTMCNC: 31.5 GM/DL (ref 32.2–35.5)
MCV RBC AUTO: 84.5 FL (ref 80–94)
PLATELET # BLD AUTO: 331 THOU/MM3 (ref 130–400)
PMV BLD AUTO: 9 FL (ref 9.4–12.4)
POTASSIUM SERPL-SCNC: 4.2 MEQ/L (ref 3.5–5.2)
RBC # BLD AUTO: 5.04 MILL/MM3 (ref 4.7–6.1)
SODIUM SERPL-SCNC: 140 MEQ/L (ref 135–145)
WBC # BLD AUTO: 15.4 THOU/MM3 (ref 4.8–10.8)

## 2024-07-27 ENCOUNTER — APPOINTMENT (OUTPATIENT)
Dept: CT IMAGING | Age: 49
End: 2024-07-27
Attending: STUDENT IN AN ORGANIZED HEALTH CARE EDUCATION/TRAINING PROGRAM
Payer: COMMERCIAL

## 2024-07-27 LAB
ANION GAP SERPL CALC-SCNC: 10 MEQ/L (ref 8–16)
BUN SERPL-MCNC: 16 MG/DL (ref 7–22)
CALCIUM SERPL-MCNC: 8.3 MG/DL (ref 8.5–10.5)
CHLORIDE SERPL-SCNC: 99 MEQ/L (ref 98–111)
CO2 SERPL-SCNC: 28 MEQ/L (ref 23–33)
CREAT SERPL-MCNC: 0.7 MG/DL (ref 0.4–1.2)
DEPRECATED RDW RBC AUTO: 40.7 FL (ref 35–45)
ERYTHROCYTE [DISTWIDTH] IN BLOOD BY AUTOMATED COUNT: 13.3 % (ref 11.5–14.5)
GFR SERPL CREATININE-BSD FRML MDRD: > 90 ML/MIN/1.73M2
GLUCOSE SERPL-MCNC: 73 MG/DL (ref 70–108)
HCT VFR BLD AUTO: 44 % (ref 42–52)
HGB BLD-MCNC: 13.8 GM/DL (ref 14–18)
MCH RBC QN AUTO: 26.3 PG (ref 26–33)
MCHC RBC AUTO-ENTMCNC: 31.4 GM/DL (ref 32.2–35.5)
MCV RBC AUTO: 84 FL (ref 80–94)
PLATELET # BLD AUTO: 333 THOU/MM3 (ref 130–400)
PMV BLD AUTO: 9.1 FL (ref 9.4–12.4)
POTASSIUM SERPL-SCNC: 4.3 MEQ/L (ref 3.5–5.2)
RBC # BLD AUTO: 5.24 MILL/MM3 (ref 4.7–6.1)
SODIUM SERPL-SCNC: 137 MEQ/L (ref 135–145)
WBC # BLD AUTO: 13.8 THOU/MM3 (ref 4.8–10.8)

## 2024-07-27 PROCEDURE — 72132 CT LUMBAR SPINE W/DYE: CPT

## 2024-07-27 PROCEDURE — 6360000004 HC RX CONTRAST MEDICATION: Performed by: STUDENT IN AN ORGANIZED HEALTH CARE EDUCATION/TRAINING PROGRAM

## 2024-07-27 RX ADMIN — IOPAMIDOL 80 ML: 755 INJECTION, SOLUTION INTRAVENOUS at 10:40

## 2024-07-28 LAB
ANION GAP SERPL CALC-SCNC: 10 MEQ/L (ref 8–16)
BUN SERPL-MCNC: 17 MG/DL (ref 7–22)
CALCIUM SERPL-MCNC: 8.3 MG/DL (ref 8.5–10.5)
CHLORIDE SERPL-SCNC: 97 MEQ/L (ref 98–111)
CO2 SERPL-SCNC: 28 MEQ/L (ref 23–33)
CREAT SERPL-MCNC: 0.6 MG/DL (ref 0.4–1.2)
DEPRECATED RDW RBC AUTO: 40.8 FL (ref 35–45)
ERYTHROCYTE [DISTWIDTH] IN BLOOD BY AUTOMATED COUNT: 13.4 % (ref 11.5–14.5)
GFR SERPL CREATININE-BSD FRML MDRD: > 90 ML/MIN/1.73M2
GLUCOSE SERPL-MCNC: 77 MG/DL (ref 70–108)
HCT VFR BLD AUTO: 40.1 % (ref 42–52)
HGB BLD-MCNC: 12.9 GM/DL (ref 14–18)
MCH RBC QN AUTO: 26.8 PG (ref 26–33)
MCHC RBC AUTO-ENTMCNC: 32.2 GM/DL (ref 32.2–35.5)
MCV RBC AUTO: 83.2 FL (ref 80–94)
PLATELET # BLD AUTO: 306 THOU/MM3 (ref 130–400)
PMV BLD AUTO: 9.1 FL (ref 9.4–12.4)
POTASSIUM SERPL-SCNC: 4.4 MEQ/L (ref 3.5–5.2)
RBC # BLD AUTO: 4.82 MILL/MM3 (ref 4.7–6.1)
SODIUM SERPL-SCNC: 135 MEQ/L (ref 135–145)
WBC # BLD AUTO: 14.5 THOU/MM3 (ref 4.8–10.8)

## 2024-07-29 LAB
ANION GAP SERPL CALC-SCNC: 5 MEQ/L (ref 8–16)
BUN SERPL-MCNC: 19 MG/DL (ref 7–22)
CALCIUM SERPL-MCNC: 8.5 MG/DL (ref 8.5–10.5)
CHLORIDE SERPL-SCNC: 100 MEQ/L (ref 98–111)
CO2 SERPL-SCNC: 31 MEQ/L (ref 23–33)
CREAT SERPL-MCNC: 0.6 MG/DL (ref 0.4–1.2)
DEPRECATED RDW RBC AUTO: 41.4 FL (ref 35–45)
ERYTHROCYTE [DISTWIDTH] IN BLOOD BY AUTOMATED COUNT: 13.5 % (ref 11.5–14.5)
GFR SERPL CREATININE-BSD FRML MDRD: > 90 ML/MIN/1.73M2
GLUCOSE SERPL-MCNC: 106 MG/DL (ref 70–108)
HCT VFR BLD AUTO: 40.8 % (ref 42–52)
HGB BLD-MCNC: 12.8 GM/DL (ref 14–18)
MCH RBC QN AUTO: 26.4 PG (ref 26–33)
MCHC RBC AUTO-ENTMCNC: 31.4 GM/DL (ref 32.2–35.5)
MCV RBC AUTO: 84.1 FL (ref 80–94)
PLATELET # BLD AUTO: 277 THOU/MM3 (ref 130–400)
PMV BLD AUTO: 8.8 FL (ref 9.4–12.4)
POTASSIUM SERPL-SCNC: 4.6 MEQ/L (ref 3.5–5.2)
RBC # BLD AUTO: 4.85 MILL/MM3 (ref 4.7–6.1)
SODIUM SERPL-SCNC: 136 MEQ/L (ref 135–145)
WBC # BLD AUTO: 10.1 THOU/MM3 (ref 4.8–10.8)

## 2024-07-30 LAB
ANION GAP SERPL CALC-SCNC: 8 MEQ/L (ref 8–16)
BUN SERPL-MCNC: 17 MG/DL (ref 7–22)
CALCIUM SERPL-MCNC: 9 MG/DL (ref 8.5–10.5)
CHLORIDE SERPL-SCNC: 99 MEQ/L (ref 98–111)
CO2 SERPL-SCNC: 30 MEQ/L (ref 23–33)
CREAT SERPL-MCNC: 0.6 MG/DL (ref 0.4–1.2)
DEPRECATED RDW RBC AUTO: 41.5 FL (ref 35–45)
ERYTHROCYTE [DISTWIDTH] IN BLOOD BY AUTOMATED COUNT: 13.5 % (ref 11.5–14.5)
GFR SERPL CREATININE-BSD FRML MDRD: > 90 ML/MIN/1.73M2
GLUCOSE SERPL-MCNC: 78 MG/DL (ref 70–108)
HCT VFR BLD AUTO: 42.7 % (ref 42–52)
HGB BLD-MCNC: 13.2 GM/DL (ref 14–18)
MCH RBC QN AUTO: 26.1 PG (ref 26–33)
MCHC RBC AUTO-ENTMCNC: 30.9 GM/DL (ref 32.2–35.5)
MCV RBC AUTO: 84.4 FL (ref 80–94)
PLATELET # BLD AUTO: 313 THOU/MM3 (ref 130–400)
PMV BLD AUTO: 9 FL (ref 9.4–12.4)
POTASSIUM SERPL-SCNC: 4.9 MEQ/L (ref 3.5–5.2)
RBC # BLD AUTO: 5.06 MILL/MM3 (ref 4.7–6.1)
SODIUM SERPL-SCNC: 137 MEQ/L (ref 135–145)
WBC # BLD AUTO: 11.5 THOU/MM3 (ref 4.8–10.8)

## 2024-07-31 LAB
ANION GAP SERPL CALC-SCNC: 10 MEQ/L (ref 8–16)
BUN SERPL-MCNC: 23 MG/DL (ref 7–22)
CALCIUM SERPL-MCNC: 9.1 MG/DL (ref 8.5–10.5)
CHLORIDE SERPL-SCNC: 98 MEQ/L (ref 98–111)
CO2 SERPL-SCNC: 28 MEQ/L (ref 23–33)
CREAT SERPL-MCNC: 0.5 MG/DL (ref 0.4–1.2)
CRP SERPL-MCNC: 7.45 MG/DL (ref 0–1)
DEPRECATED RDW RBC AUTO: 41.3 FL (ref 35–45)
ERYTHROCYTE [DISTWIDTH] IN BLOOD BY AUTOMATED COUNT: 13.2 % (ref 11.5–14.5)
GFR SERPL CREATININE-BSD FRML MDRD: > 90 ML/MIN/1.73M2
GLUCOSE SERPL-MCNC: 127 MG/DL (ref 70–108)
HCT VFR BLD AUTO: 44.9 % (ref 42–52)
HGB BLD-MCNC: 13.9 GM/DL (ref 14–18)
MCH RBC QN AUTO: 26.3 PG (ref 26–33)
MCHC RBC AUTO-ENTMCNC: 31 GM/DL (ref 32.2–35.5)
MCV RBC AUTO: 85 FL (ref 80–94)
PLATELET # BLD AUTO: 362 THOU/MM3 (ref 130–400)
PMV BLD AUTO: 9.2 FL (ref 9.4–12.4)
POTASSIUM SERPL-SCNC: 5.1 MEQ/L (ref 3.5–5.2)
RBC # BLD AUTO: 5.28 MILL/MM3 (ref 4.7–6.1)
SODIUM SERPL-SCNC: 136 MEQ/L (ref 135–145)
WBC # BLD AUTO: 11.7 THOU/MM3 (ref 4.8–10.8)

## 2024-08-01 LAB
ANION GAP SERPL CALC-SCNC: 9 MEQ/L (ref 8–16)
BUN SERPL-MCNC: 25 MG/DL (ref 7–22)
CALCIUM SERPL-MCNC: 8.7 MG/DL (ref 8.5–10.5)
CHLORIDE SERPL-SCNC: 100 MEQ/L (ref 98–111)
CO2 SERPL-SCNC: 31 MEQ/L (ref 23–33)
CREAT SERPL-MCNC: 0.7 MG/DL (ref 0.4–1.2)
DEPRECATED RDW RBC AUTO: 41.9 FL (ref 35–45)
ERYTHROCYTE [DISTWIDTH] IN BLOOD BY AUTOMATED COUNT: 13.6 % (ref 11.5–14.5)
GFR SERPL CREATININE-BSD FRML MDRD: > 90 ML/MIN/1.73M2
GLUCOSE SERPL-MCNC: 79 MG/DL (ref 70–108)
HCT VFR BLD AUTO: 41.8 % (ref 42–52)
HGB BLD-MCNC: 13 GM/DL (ref 14–18)
MCH RBC QN AUTO: 26.4 PG (ref 26–33)
MCHC RBC AUTO-ENTMCNC: 31.1 GM/DL (ref 32.2–35.5)
MCV RBC AUTO: 85 FL (ref 80–94)
PLATELET # BLD AUTO: 308 THOU/MM3 (ref 130–400)
PMV BLD AUTO: 9.2 FL (ref 9.4–12.4)
POTASSIUM SERPL-SCNC: 4.4 MEQ/L (ref 3.5–5.2)
RBC # BLD AUTO: 4.92 MILL/MM3 (ref 4.7–6.1)
SODIUM SERPL-SCNC: 140 MEQ/L (ref 135–145)
WBC # BLD AUTO: 12.1 THOU/MM3 (ref 4.8–10.8)

## 2024-08-02 LAB
ANION GAP SERPL CALC-SCNC: 9 MEQ/L (ref 8–16)
BUN SERPL-MCNC: 22 MG/DL (ref 7–22)
CALCIUM SERPL-MCNC: 8.5 MG/DL (ref 8.5–10.5)
CHLORIDE SERPL-SCNC: 101 MEQ/L (ref 98–111)
CO2 SERPL-SCNC: 28 MEQ/L (ref 23–33)
CREAT SERPL-MCNC: 0.7 MG/DL (ref 0.4–1.2)
DEPRECATED RDW RBC AUTO: 42 FL (ref 35–45)
ERYTHROCYTE [DISTWIDTH] IN BLOOD BY AUTOMATED COUNT: 13.6 % (ref 11.5–14.5)
GFR SERPL CREATININE-BSD FRML MDRD: > 90 ML/MIN/1.73M2
GLUCOSE SERPL-MCNC: 76 MG/DL (ref 70–108)
HCT VFR BLD AUTO: 40.2 % (ref 42–52)
HGB BLD-MCNC: 12.4 GM/DL (ref 14–18)
MCH RBC QN AUTO: 26.1 PG (ref 26–33)
MCHC RBC AUTO-ENTMCNC: 30.8 GM/DL (ref 32.2–35.5)
MCV RBC AUTO: 84.6 FL (ref 80–94)
PLATELET # BLD AUTO: 271 THOU/MM3 (ref 130–400)
PMV BLD AUTO: 9.1 FL (ref 9.4–12.4)
POTASSIUM SERPL-SCNC: 4.6 MEQ/L (ref 3.5–5.2)
RBC # BLD AUTO: 4.75 MILL/MM3 (ref 4.7–6.1)
SODIUM SERPL-SCNC: 138 MEQ/L (ref 135–145)
WBC # BLD AUTO: 8 THOU/MM3 (ref 4.8–10.8)

## 2024-08-03 LAB
BASOPHILS ABSOLUTE: 0.1 THOU/MM3 (ref 0–0.1)
BASOPHILS NFR BLD AUTO: 0.6 %
BUN SERPL-MCNC: 19 MG/DL (ref 7–22)
CALCIUM SERPL-MCNC: 8.7 MG/DL (ref 8.5–10.5)
CHLORIDE SERPL-SCNC: 99 MEQ/L (ref 98–111)
CO2 SERPL-SCNC: 31 MEQ/L (ref 23–33)
CREAT SERPL-MCNC: 0.7 MG/DL (ref 0.4–1.2)
DEPRECATED RDW RBC AUTO: 41.7 FL (ref 35–45)
EOSINOPHIL NFR BLD AUTO: 2.5 %
EOSINOPHILS ABSOLUTE: 0.2 THOU/MM3 (ref 0–0.4)
ERYTHROCYTE [DISTWIDTH] IN BLOOD BY AUTOMATED COUNT: 13.5 % (ref 11.5–14.5)
GFR SERPL CREATININE-BSD FRML MDRD: > 90 ML/MIN/1.73M2
GLUCOSE SERPL-MCNC: 123 MG/DL (ref 70–108)
HCT VFR BLD AUTO: 40.6 % (ref 42–52)
HGB BLD-MCNC: 12.5 GM/DL (ref 14–18)
IMM GRANULOCYTES # BLD AUTO: 0.11 THOU/MM3 (ref 0–0.07)
IMM GRANULOCYTES NFR BLD AUTO: 1.2 %
LYMPHOCYTES ABSOLUTE: 2.1 THOU/MM3 (ref 1–4.8)
LYMPHOCYTES NFR BLD AUTO: 23.6 %
MCH RBC QN AUTO: 26 PG (ref 26–33)
MCHC RBC AUTO-ENTMCNC: 30.8 GM/DL (ref 32.2–35.5)
MCV RBC AUTO: 84.6 FL (ref 80–94)
MONOCYTES ABSOLUTE: 1 THOU/MM3 (ref 0.4–1.3)
MONOCYTES NFR BLD AUTO: 11.3 %
NEUTROPHILS ABSOLUTE: 5.5 THOU/MM3 (ref 1.8–7.7)
NEUTROPHILS NFR BLD AUTO: 60.8 %
NRBC BLD AUTO-RTO: 0 /100 WBC
PLATELET # BLD AUTO: 300 THOU/MM3 (ref 130–400)
PMV BLD AUTO: 9 FL (ref 9.4–12.4)
POTASSIUM SERPL-SCNC: 4.1 MEQ/L (ref 3.5–5.2)
RBC # BLD AUTO: 4.8 MILL/MM3 (ref 4.7–6.1)
SODIUM SERPL-SCNC: 138 MEQ/L (ref 135–145)
WBC # BLD AUTO: 9 THOU/MM3 (ref 4.8–10.8)

## 2024-08-04 LAB
ANION GAP SERPL CALC-SCNC: 7 MEQ/L (ref 8–16)
BASOPHILS ABSOLUTE: 0.1 THOU/MM3 (ref 0–0.1)
BASOPHILS NFR BLD AUTO: 0.8 %
BUN SERPL-MCNC: 21 MG/DL (ref 7–22)
CALCIUM SERPL-MCNC: 8.6 MG/DL (ref 8.5–10.5)
CHLORIDE SERPL-SCNC: 100 MEQ/L (ref 98–111)
CO2 SERPL-SCNC: 30 MEQ/L (ref 23–33)
CREAT SERPL-MCNC: 0.7 MG/DL (ref 0.4–1.2)
DEPRECATED RDW RBC AUTO: 41.6 FL (ref 35–45)
EOSINOPHIL NFR BLD AUTO: 3 %
EOSINOPHILS ABSOLUTE: 0.2 THOU/MM3 (ref 0–0.4)
ERYTHROCYTE [DISTWIDTH] IN BLOOD BY AUTOMATED COUNT: 13.5 % (ref 11.5–14.5)
GFR SERPL CREATININE-BSD FRML MDRD: > 90 ML/MIN/1.73M2
GLUCOSE SERPL-MCNC: 80 MG/DL (ref 70–108)
HCT VFR BLD AUTO: 38.7 % (ref 42–52)
HGB BLD-MCNC: 12.2 GM/DL (ref 14–18)
IMM GRANULOCYTES # BLD AUTO: 0.08 THOU/MM3 (ref 0–0.07)
IMM GRANULOCYTES NFR BLD AUTO: 1 %
LYMPHOCYTES ABSOLUTE: 2.3 THOU/MM3 (ref 1–4.8)
LYMPHOCYTES NFR BLD AUTO: 28.5 %
MCH RBC QN AUTO: 26.5 PG (ref 26–33)
MCHC RBC AUTO-ENTMCNC: 31.5 GM/DL (ref 32.2–35.5)
MCV RBC AUTO: 84.1 FL (ref 80–94)
MONOCYTES ABSOLUTE: 0.9 THOU/MM3 (ref 0.4–1.3)
MONOCYTES NFR BLD AUTO: 11 %
NEUTROPHILS ABSOLUTE: 4.4 THOU/MM3 (ref 1.8–7.7)
NEUTROPHILS NFR BLD AUTO: 55.7 %
NRBC BLD AUTO-RTO: 0 /100 WBC
PLATELET # BLD AUTO: 283 THOU/MM3 (ref 130–400)
PMV BLD AUTO: 8.7 FL (ref 9.4–12.4)
POTASSIUM SERPL-SCNC: 4.7 MEQ/L (ref 3.5–5.2)
RBC # BLD AUTO: 4.6 MILL/MM3 (ref 4.7–6.1)
SODIUM SERPL-SCNC: 137 MEQ/L (ref 135–145)
WBC # BLD AUTO: 7.9 THOU/MM3 (ref 4.8–10.8)

## 2024-08-05 LAB
ALBUMIN SERPL BCG-MCNC: 3.6 G/DL (ref 3.5–5.1)
ALT SERPL W/O P-5'-P-CCNC: 23 U/L (ref 11–66)
ANION GAP SERPL CALC-SCNC: 9 MEQ/L (ref 8–16)
AST SERPL-CCNC: 22 U/L (ref 5–40)
BILIRUB CONJ SERPL-MCNC: < 0.1 MG/DL (ref 0.1–13.8)
BILIRUB SERPL-MCNC: < 0.2 MG/DL (ref 0.3–1.2)
BUN SERPL-MCNC: 19 MG/DL (ref 7–22)
CALCIUM SERPL-MCNC: 8.8 MG/DL (ref 8.5–10.5)
CHLORIDE SERPL-SCNC: 100 MEQ/L (ref 98–111)
CO2 SERPL-SCNC: 30 MEQ/L (ref 23–33)
CREAT SERPL-MCNC: 0.7 MG/DL (ref 0.4–1.2)
DEPRECATED RDW RBC AUTO: 40.9 FL (ref 35–45)
ERYTHROCYTE [DISTWIDTH] IN BLOOD BY AUTOMATED COUNT: 13.4 % (ref 11.5–14.5)
GFR SERPL CREATININE-BSD FRML MDRD: > 90 ML/MIN/1.73M2
GLUCOSE SERPL-MCNC: 82 MG/DL (ref 70–108)
HCT VFR BLD AUTO: 38.4 % (ref 42–52)
HGB BLD-MCNC: 12.1 GM/DL (ref 14–18)
MCH RBC QN AUTO: 26.5 PG (ref 26–33)
MCHC RBC AUTO-ENTMCNC: 31.5 GM/DL (ref 32.2–35.5)
MCV RBC AUTO: 84 FL (ref 80–94)
PLATELET # BLD AUTO: 291 THOU/MM3 (ref 130–400)
PMV BLD AUTO: 9 FL (ref 9.4–12.4)
POTASSIUM SERPL-SCNC: 4.5 MEQ/L (ref 3.5–5.2)
PROT SERPL-MCNC: 7.4 G/DL (ref 6.1–8)
RBC # BLD AUTO: 4.57 MILL/MM3 (ref 4.7–6.1)
SODIUM SERPL-SCNC: 139 MEQ/L (ref 135–145)
WBC # BLD AUTO: 10.5 THOU/MM3 (ref 4.8–10.8)

## 2024-08-06 LAB
ANION GAP SERPL CALC-SCNC: 12 MEQ/L (ref 8–16)
BUN SERPL-MCNC: 21 MG/DL (ref 7–22)
CALCIUM SERPL-MCNC: 8.7 MG/DL (ref 8.5–10.5)
CHLORIDE SERPL-SCNC: 101 MEQ/L (ref 98–111)
CO2 SERPL-SCNC: 28 MEQ/L (ref 23–33)
CREAT SERPL-MCNC: 0.8 MG/DL (ref 0.4–1.2)
DEPRECATED RDW RBC AUTO: 41.5 FL (ref 35–45)
ERYTHROCYTE [DISTWIDTH] IN BLOOD BY AUTOMATED COUNT: 13.5 % (ref 11.5–14.5)
GFR SERPL CREATININE-BSD FRML MDRD: > 90 ML/MIN/1.73M2
GLUCOSE SERPL-MCNC: 77 MG/DL (ref 70–108)
HCT VFR BLD AUTO: 39.3 % (ref 42–52)
HGB BLD-MCNC: 12.2 GM/DL (ref 14–18)
MCH RBC QN AUTO: 26.2 PG (ref 26–33)
MCHC RBC AUTO-ENTMCNC: 31 GM/DL (ref 32.2–35.5)
MCV RBC AUTO: 84.5 FL (ref 80–94)
PLATELET # BLD AUTO: 307 THOU/MM3 (ref 130–400)
PMV BLD AUTO: 9.1 FL (ref 9.4–12.4)
POTASSIUM SERPL-SCNC: 4.6 MEQ/L (ref 3.5–5.2)
RBC # BLD AUTO: 4.65 MILL/MM3 (ref 4.7–6.1)
SODIUM SERPL-SCNC: 141 MEQ/L (ref 135–145)
WBC # BLD AUTO: 7 THOU/MM3 (ref 4.8–10.8)

## 2024-08-07 LAB
ANION GAP SERPL CALC-SCNC: 11 MEQ/L (ref 8–16)
BUN SERPL-MCNC: 21 MG/DL (ref 7–22)
CALCIUM SERPL-MCNC: 8.5 MG/DL (ref 8.5–10.5)
CHLORIDE SERPL-SCNC: 98 MEQ/L (ref 98–111)
CO2 SERPL-SCNC: 28 MEQ/L (ref 23–33)
CREAT SERPL-MCNC: 0.8 MG/DL (ref 0.4–1.2)
CRP SERPL-MCNC: 4.6 MG/DL (ref 0–1)
DEPRECATED RDW RBC AUTO: 41.4 FL (ref 35–45)
ERYTHROCYTE [DISTWIDTH] IN BLOOD BY AUTOMATED COUNT: 13.6 % (ref 11.5–14.5)
GFR SERPL CREATININE-BSD FRML MDRD: > 90 ML/MIN/1.73M2
GLUCOSE SERPL-MCNC: 76 MG/DL (ref 70–108)
HCT VFR BLD AUTO: 35.5 % (ref 42–52)
HGB BLD-MCNC: 11.2 GM/DL (ref 14–18)
MCH RBC QN AUTO: 26.4 PG (ref 26–33)
MCHC RBC AUTO-ENTMCNC: 31.5 GM/DL (ref 32.2–35.5)
MCV RBC AUTO: 83.7 FL (ref 80–94)
PLATELET # BLD AUTO: 281 THOU/MM3 (ref 130–400)
PMV BLD AUTO: 8.9 FL (ref 9.4–12.4)
POTASSIUM SERPL-SCNC: 4.3 MEQ/L (ref 3.5–5.2)
RBC # BLD AUTO: 4.24 MILL/MM3 (ref 4.7–6.1)
SODIUM SERPL-SCNC: 137 MEQ/L (ref 135–145)
WBC # BLD AUTO: 8.4 THOU/MM3 (ref 4.8–10.8)

## 2024-08-21 ENCOUNTER — HOSPITAL ENCOUNTER (INPATIENT)
Age: 49
LOS: 2 days | Discharge: HOME | End: 2024-08-23
Attending: STUDENT IN AN ORGANIZED HEALTH CARE EDUCATION/TRAINING PROGRAM
Payer: COMMERCIAL

## 2024-08-21 LAB
ALBUMIN SERPL BCG-MCNC: 3.6 G/DL (ref 3.5–5.1)
ALP SERPL-CCNC: 83 U/L (ref 38–126)
ALT SERPL W/O P-5'-P-CCNC: 10 U/L (ref 11–66)
ANION GAP SERPL CALC-SCNC: 11 MEQ/L (ref 8–16)
AST SERPL-CCNC: 16 U/L (ref 5–40)
BASOPHILS ABSOLUTE: 0 THOU/MM3 (ref 0–0.1)
BASOPHILS NFR BLD AUTO: 0.6 %
BILIRUB SERPL-MCNC: 0.5 MG/DL (ref 0.3–1.2)
BUN SERPL-MCNC: 10 MG/DL (ref 7–22)
CALCIUM SERPL-MCNC: 8.3 MG/DL (ref 8.5–10.5)
CHLORIDE SERPL-SCNC: 108 MEQ/L (ref 98–111)
CO2 SERPL-SCNC: 21 MEQ/L (ref 23–33)
CREAT SERPL-MCNC: 0.6 MG/DL (ref 0.4–1.2)
DEPRECATED RDW RBC AUTO: 41 FL (ref 35–45)
EKG ATRIAL RATE: 86 BPM
EKG P AXIS: 59 DEGREES
EKG P-R INTERVAL: 144 MS
EKG Q-T INTERVAL: 398 MS
EKG QRS DURATION: 94 MS
EKG QTC CALCULATION (BAZETT): 453 MS
EKG R AXIS: 87 DEGREES
EKG T AXIS: 80 DEGREES
EKG VENTRICULAR RATE: 78 BPM
EOSINOPHIL NFR BLD AUTO: 4.9 %
EOSINOPHILS ABSOLUTE: 0.3 THOU/MM3 (ref 0–0.4)
ERYTHROCYTE [DISTWIDTH] IN BLOOD BY AUTOMATED COUNT: 14 % (ref 11.5–14.5)
GFR SERPL CREATININE-BSD FRML MDRD: > 90 ML/MIN/1.73M2
GLUCOSE SERPL-MCNC: 102 MG/DL (ref 70–108)
HCT VFR BLD AUTO: 39.3 % (ref 42–52)
HGB BLD-MCNC: 12.7 GM/DL (ref 14–18)
IMM GRANULOCYTES # BLD AUTO: 0.02 THOU/MM3 (ref 0–0.07)
IMM GRANULOCYTES NFR BLD AUTO: 0.3 %
LYMPHOCYTES ABSOLUTE: 1.1 THOU/MM3 (ref 1–4.8)
LYMPHOCYTES NFR BLD AUTO: 16.5 %
MCH RBC QN AUTO: 26.3 PG (ref 26–33)
MCHC RBC AUTO-ENTMCNC: 32.3 GM/DL (ref 32.2–35.5)
MCV RBC AUTO: 81.4 FL (ref 80–94)
MONOCYTES ABSOLUTE: 0.3 THOU/MM3 (ref 0.4–1.3)
MONOCYTES NFR BLD AUTO: 4.9 %
NEUTROPHILS ABSOLUTE: 5 THOU/MM3 (ref 1.8–7.7)
NEUTROPHILS NFR BLD AUTO: 72.8 %
NRBC BLD AUTO-RTO: 0 /100 WBC
PLATELET # BLD AUTO: 288 THOU/MM3 (ref 130–400)
PMV BLD AUTO: 9.3 FL (ref 9.4–12.4)
POTASSIUM SERPL-SCNC: 4.1 MEQ/L (ref 3.5–5.2)
PROT SERPL-MCNC: 6.7 G/DL (ref 6.1–8)
RBC # BLD AUTO: 4.83 MILL/MM3 (ref 4.7–6.1)
SODIUM SERPL-SCNC: 140 MEQ/L (ref 135–145)
WBC # BLD AUTO: 6.8 THOU/MM3 (ref 4.8–10.8)

## 2024-08-21 PROCEDURE — 87040 BLOOD CULTURE FOR BACTERIA: CPT

## 2024-08-21 PROCEDURE — 6370000000 HC RX 637 (ALT 250 FOR IP)

## 2024-08-21 PROCEDURE — 6360000002 HC RX W HCPCS

## 2024-08-21 PROCEDURE — 2580000003 HC RX 258

## 2024-08-21 PROCEDURE — 93010 ELECTROCARDIOGRAM REPORT: CPT | Performed by: INTERNAL MEDICINE

## 2024-08-21 PROCEDURE — 85025 COMPLETE CBC W/AUTO DIFF WBC: CPT

## 2024-08-21 PROCEDURE — 1200000003 HC TELEMETRY R&B

## 2024-08-21 PROCEDURE — 80053 COMPREHEN METABOLIC PANEL: CPT

## 2024-08-21 PROCEDURE — 93005 ELECTROCARDIOGRAM TRACING: CPT

## 2024-08-21 PROCEDURE — 36415 COLL VENOUS BLD VENIPUNCTURE: CPT

## 2024-08-21 PROCEDURE — 99223 1ST HOSP IP/OBS HIGH 75: CPT

## 2024-08-21 RX ORDER — POTASSIUM CHLORIDE 7.45 MG/ML
10 INJECTION INTRAVENOUS PRN
Status: DISCONTINUED | OUTPATIENT
Start: 2024-08-21 | End: 2024-08-23 | Stop reason: HOSPADM

## 2024-08-21 RX ORDER — NICOTINE 21 MG/24HR
1 PATCH, TRANSDERMAL 24 HOURS TRANSDERMAL DAILY
Status: DISCONTINUED | OUTPATIENT
Start: 2024-08-21 | End: 2024-08-23 | Stop reason: HOSPADM

## 2024-08-21 RX ORDER — SODIUM CHLORIDE 9 MG/ML
INJECTION, SOLUTION INTRAVENOUS PRN
Status: DISCONTINUED | OUTPATIENT
Start: 2024-08-21 | End: 2024-08-23 | Stop reason: HOSPADM

## 2024-08-21 RX ORDER — OXYCODONE HYDROCHLORIDE 5 MG/1
5 TABLET ORAL EVERY 4 HOURS PRN
Status: DISCONTINUED | OUTPATIENT
Start: 2024-08-21 | End: 2024-08-23 | Stop reason: HOSPADM

## 2024-08-21 RX ORDER — DEXAMETHASONE SODIUM PHOSPHATE 4 MG/ML
6 INJECTION, SOLUTION INTRA-ARTICULAR; INTRALESIONAL; INTRAMUSCULAR; INTRAVENOUS; SOFT TISSUE EVERY 6 HOURS
Status: DISCONTINUED | OUTPATIENT
Start: 2024-08-21 | End: 2024-08-21

## 2024-08-21 RX ORDER — MAGNESIUM SULFATE IN WATER 40 MG/ML
2000 INJECTION, SOLUTION INTRAVENOUS PRN
Status: DISCONTINUED | OUTPATIENT
Start: 2024-08-21 | End: 2024-08-23 | Stop reason: HOSPADM

## 2024-08-21 RX ORDER — PSEUDOEPHEDRINE HCL 30 MG
100 TABLET ORAL 2 TIMES DAILY
Status: DISCONTINUED | OUTPATIENT
Start: 2024-08-21 | End: 2024-08-21

## 2024-08-21 RX ORDER — ACETAMINOPHEN 325 MG/1
650 TABLET ORAL EVERY 6 HOURS PRN
Status: DISCONTINUED | OUTPATIENT
Start: 2024-08-21 | End: 2024-08-23 | Stop reason: HOSPADM

## 2024-08-21 RX ORDER — ONDANSETRON 2 MG/ML
4 INJECTION INTRAMUSCULAR; INTRAVENOUS EVERY 6 HOURS PRN
Status: DISCONTINUED | OUTPATIENT
Start: 2024-08-21 | End: 2024-08-23 | Stop reason: HOSPADM

## 2024-08-21 RX ORDER — OXYCODONE HYDROCHLORIDE 5 MG/1
10 TABLET ORAL EVERY 4 HOURS PRN
Status: DISCONTINUED | OUTPATIENT
Start: 2024-08-21 | End: 2024-08-23 | Stop reason: HOSPADM

## 2024-08-21 RX ORDER — SODIUM CHLORIDE 0.9 % (FLUSH) 0.9 %
5-40 SYRINGE (ML) INJECTION EVERY 12 HOURS SCHEDULED
Status: DISCONTINUED | OUTPATIENT
Start: 2024-08-21 | End: 2024-08-23 | Stop reason: HOSPADM

## 2024-08-21 RX ORDER — GABAPENTIN 100 MG/1
200 CAPSULE ORAL 3 TIMES DAILY
Status: DISCONTINUED | OUTPATIENT
Start: 2024-08-21 | End: 2024-08-21

## 2024-08-21 RX ORDER — POTASSIUM CHLORIDE 1500 MG/1
40 TABLET, EXTENDED RELEASE ORAL PRN
Status: DISCONTINUED | OUTPATIENT
Start: 2024-08-21 | End: 2024-08-23 | Stop reason: HOSPADM

## 2024-08-21 RX ORDER — SODIUM CHLORIDE 0.9 % (FLUSH) 0.9 %
5-40 SYRINGE (ML) INJECTION PRN
Status: DISCONTINUED | OUTPATIENT
Start: 2024-08-21 | End: 2024-08-23 | Stop reason: HOSPADM

## 2024-08-21 RX ORDER — CYCLOBENZAPRINE HCL 10 MG
10 TABLET ORAL 3 TIMES DAILY PRN
Status: DISCONTINUED | OUTPATIENT
Start: 2024-08-21 | End: 2024-08-23 | Stop reason: HOSPADM

## 2024-08-21 RX ORDER — POLYETHYLENE GLYCOL 3350 17 G/17G
17 POWDER, FOR SOLUTION ORAL DAILY PRN
Status: DISCONTINUED | OUTPATIENT
Start: 2024-08-21 | End: 2024-08-23 | Stop reason: HOSPADM

## 2024-08-21 RX ORDER — ACETAMINOPHEN 650 MG/1
650 SUPPOSITORY RECTAL EVERY 6 HOURS PRN
Status: DISCONTINUED | OUTPATIENT
Start: 2024-08-21 | End: 2024-08-23 | Stop reason: HOSPADM

## 2024-08-21 RX ORDER — ONDANSETRON 4 MG/1
4 TABLET, ORALLY DISINTEGRATING ORAL EVERY 8 HOURS PRN
Status: DISCONTINUED | OUTPATIENT
Start: 2024-08-21 | End: 2024-08-23 | Stop reason: HOSPADM

## 2024-08-21 RX ORDER — SENNOSIDES A AND B 8.6 MG/1
2 TABLET, FILM COATED ORAL NIGHTLY
Status: DISCONTINUED | OUTPATIENT
Start: 2024-08-21 | End: 2024-08-21

## 2024-08-21 RX ORDER — ENOXAPARIN SODIUM 100 MG/ML
40 INJECTION SUBCUTANEOUS DAILY
Status: DISCONTINUED | OUTPATIENT
Start: 2024-08-21 | End: 2024-08-23

## 2024-08-21 RX ORDER — DEXAMETHASONE SODIUM PHOSPHATE 4 MG/ML
4 INJECTION, SOLUTION INTRA-ARTICULAR; INTRALESIONAL; INTRAMUSCULAR; INTRAVENOUS; SOFT TISSUE EVERY 6 HOURS
Status: DISCONTINUED | OUTPATIENT
Start: 2024-08-21 | End: 2024-08-23 | Stop reason: HOSPADM

## 2024-08-21 RX ORDER — SODIUM CHLORIDE 9 MG/ML
INJECTION, SOLUTION INTRAVENOUS CONTINUOUS
Status: DISCONTINUED | OUTPATIENT
Start: 2024-08-21 | End: 2024-08-22

## 2024-08-21 RX ADMIN — SODIUM CHLORIDE, PRESERVATIVE FREE 10 ML: 5 INJECTION INTRAVENOUS at 09:10

## 2024-08-21 RX ADMIN — CEFEPIME 2000 MG: 2 INJECTION, POWDER, FOR SOLUTION INTRAVENOUS at 20:25

## 2024-08-21 RX ADMIN — DICLOFENAC SODIUM 2 G: 10 GEL TOPICAL at 09:10

## 2024-08-21 RX ADMIN — CEFEPIME 2000 MG: 2 INJECTION, POWDER, FOR SOLUTION INTRAVENOUS at 04:03

## 2024-08-21 RX ADMIN — ACETAMINOPHEN 650 MG: 325 TABLET ORAL at 02:59

## 2024-08-21 RX ADMIN — SODIUM CHLORIDE: 9 INJECTION, SOLUTION INTRAVENOUS at 02:50

## 2024-08-21 RX ADMIN — DEXAMETHASONE SODIUM PHOSPHATE 4 MG: 4 INJECTION, SOLUTION INTRA-ARTICULAR; INTRALESIONAL; INTRAMUSCULAR; INTRAVENOUS; SOFT TISSUE at 17:11

## 2024-08-21 RX ADMIN — DEXAMETHASONE SODIUM PHOSPHATE 4 MG: 4 INJECTION, SOLUTION INTRA-ARTICULAR; INTRALESIONAL; INTRAMUSCULAR; INTRAVENOUS; SOFT TISSUE at 04:03

## 2024-08-21 RX ADMIN — OXYCODONE HYDROCHLORIDE 10 MG: 5 TABLET ORAL at 17:19

## 2024-08-21 RX ADMIN — CYCLOBENZAPRINE 10 MG: 10 TABLET, FILM COATED ORAL at 02:58

## 2024-08-21 RX ADMIN — OXYCODONE HYDROCHLORIDE 10 MG: 5 TABLET ORAL at 21:20

## 2024-08-21 RX ADMIN — ENOXAPARIN SODIUM 40 MG: 100 INJECTION SUBCUTANEOUS at 09:10

## 2024-08-21 RX ADMIN — DICLOFENAC SODIUM 2 G: 10 GEL TOPICAL at 21:04

## 2024-08-21 RX ADMIN — DEXAMETHASONE SODIUM PHOSPHATE 4 MG: 4 INJECTION, SOLUTION INTRA-ARTICULAR; INTRALESIONAL; INTRAMUSCULAR; INTRAVENOUS; SOFT TISSUE at 21:04

## 2024-08-21 RX ADMIN — OXYCODONE HYDROCHLORIDE 10 MG: 5 TABLET ORAL at 09:10

## 2024-08-21 RX ADMIN — CEFEPIME 2000 MG: 2 INJECTION, POWDER, FOR SOLUTION INTRAVENOUS at 12:06

## 2024-08-21 RX ADMIN — SODIUM CHLORIDE: 9 INJECTION, SOLUTION INTRAVENOUS at 20:23

## 2024-08-21 RX ADMIN — DEXAMETHASONE SODIUM PHOSPHATE 4 MG: 4 INJECTION, SOLUTION INTRA-ARTICULAR; INTRALESIONAL; INTRAMUSCULAR; INTRAVENOUS; SOFT TISSUE at 09:10

## 2024-08-21 ASSESSMENT — PAIN SCALES - GENERAL
PAINLEVEL_OUTOF10: 4
PAINLEVEL_OUTOF10: 8
PAINLEVEL_OUTOF10: 3
PAINLEVEL_OUTOF10: 4
PAINLEVEL_OUTOF10: 7
PAINLEVEL_OUTOF10: 8
PAINLEVEL_OUTOF10: 3
PAINLEVEL_OUTOF10: 2

## 2024-08-21 ASSESSMENT — PAIN DESCRIPTION - PAIN TYPE
TYPE: ACUTE PAIN
TYPE: CHRONIC PAIN
TYPE: ACUTE PAIN
TYPE: ACUTE PAIN

## 2024-08-21 ASSESSMENT — LIFESTYLE VARIABLES
HOW OFTEN DO YOU HAVE A DRINK CONTAINING ALCOHOL: NEVER
HOW MANY STANDARD DRINKS CONTAINING ALCOHOL DO YOU HAVE ON A TYPICAL DAY: PATIENT DOES NOT DRINK

## 2024-08-21 ASSESSMENT — PAIN DESCRIPTION - FREQUENCY
FREQUENCY: CONTINUOUS

## 2024-08-21 ASSESSMENT — PAIN SCALES - WONG BAKER
WONGBAKER_NUMERICALRESPONSE: NO HURT
WONGBAKER_NUMERICALRESPONSE: HURTS EVEN MORE
WONGBAKER_NUMERICALRESPONSE: HURTS A LITTLE BIT

## 2024-08-21 ASSESSMENT — PAIN DESCRIPTION - ORIENTATION
ORIENTATION: RIGHT
ORIENTATION: RIGHT;MID
ORIENTATION: RIGHT;MID
ORIENTATION: MID
ORIENTATION: RIGHT;MID

## 2024-08-21 ASSESSMENT — PAIN DESCRIPTION - ONSET
ONSET: ON-GOING
ONSET: ON-GOING

## 2024-08-21 ASSESSMENT — PAIN DESCRIPTION - LOCATION
LOCATION: LEG;SCROTUM
LOCATION: OTHER (COMMENT)
LOCATION: BACK;LEG
LOCATION: BACK;LEG
LOCATION: BACK
LOCATION: BACK;LEG

## 2024-08-21 ASSESSMENT — PAIN DESCRIPTION - DESCRIPTORS
DESCRIPTORS: ACHING

## 2024-08-21 ASSESSMENT — PAIN - FUNCTIONAL ASSESSMENT
PAIN_FUNCTIONAL_ASSESSMENT: ACTIVITIES ARE NOT PREVENTED

## 2024-08-21 NOTE — PROCEDURES
PROCEDURE NOTE  Date: 8/21/2024   Name: Jonathan Goldsmith  YOB: 1975    Procedures    12 lead EKG completed. Results handed to RN

## 2024-08-21 NOTE — H&P
Hospitalist History & Physical    Patient:  Jonathan Goldsmith    Unit/Bed:7K-24/024-A  YOB: 1975  MRN: [id number]   Acct: [id number]   PCP: Lan Davenport MD  Code Status: Prior    Date of Service: Pt seen/examined on 08/21/24 and admitted to Inpatient with expected LOS greater than two midnights due to medical therapy.     Chief Complaint: intractable back pain    Assessment/Plan:    Intractable back pain secondary to concern for osteomyelitis: Reports developing intractable back pain today radiating down leg and into his testicles. Chart review notes patient was admitted last month for same, left AMA from [facility] without completing ABX course. History of epidural abscess in 2017 requiring surgical I&D positive for Serratia. SIRS (1/4): . OSH CT Lumbar shows discovertebral osteomyelitis at L2-3, based on widening disc space, erosion of endplates and development of perivertebral soft tissue thickening. Given Vancomycin, Rocephin at OSH.   Blood culture x2 pending  On cefepime during last admission-Continue  MRSA by PCR pending  Decadron 6mg Q6H  Pain control: Per pain management during last admission: PRN Tylenol, Oxy IR 5-10 Q4H PRN, Voltaren gel PRN, Gabapentin 200mg TID, Flexeril 10mg TID PRN  ID consult in AM    Testicular pain: Likely related to above. OSH Testicle ultrasound showed no signs of testicular torsion.  Pain control as noted above    Hypokalemia: K 3.1 at OSH, replaced with 40 mEq PO.   Telemetry monitoring  CMP in AM    HTN: Not on any home medications.   Ensure follow up with PCP    Substance use disorder: Known drug user, stated he hasn't used for a while but UDS positive for Amphetamines and Methamphetamines at OSH.     Tobacco use disorder: Reports smoking 1ppd.   Encourage cessation  Nicotine patch per request        DVT prophylaxis: Lovenox  Diet: No diet orders on file  PT/OT: No  Tele: Yes  Code Status: Prior      History of Present Illness:  Jonathan Goldsmith is a 49

## 2024-08-21 NOTE — CARE COORDINATION
Case Management Assessment Initial Evaluation    Date/Time of Evaluation: 8/21/2024 1:11 PM  Assessment Completed by: Lorna Sanchez RN    If patient is discharged prior to next notation, then this note serves as note for discharge by case management.    Patient Name: Jonathan Goldsmith                   YOB: 1975  Diagnosis: Intractable back pain [M54.9]                   Date / Time: 8/21/2024  1:26 AM  Location: [address] [place] [address]     Patient Admission Status: Inpatient   Readmission Risk Low 0-14, Mod 15-19), High > 20: Readmission Risk Score: 11.7    Current PCP: Lan Davenport MD  Health Care Decision Makers:   Primary Decision Maker: Lidia Goldsmith - Brother/Sister - [phone]    Additional Case Management Notes: [facility], went ER there after developing Intractable back pain while in court today. He was here one month ago with osteomyelitis and discharged to [facility] for continued antibiotics. He left [facility] without finishing IV antibiotic     Cefepime IV, IV steroids. ID consulted.  Procedures: na    Imaging: done pta    Patient Goals/Plan/Treatment Preferences: Met with Jonathan; he lives at home close to his sister Lidia. He has PCP, insurance, and currently is detained and handcuffed to bed with [employer] officer in room. Discharge plan on-going.                  08/21/24 1309   Service Assessment   Patient Orientation Alert and Oriented   Cognition Alert   History Provided By Patient   Primary Caregiver Self   Accompanied By/Relationship  present   Support Systems Family Members  (sister Rachel)   Patient's Healthcare Decision Maker is: Legal Next of Kin   PCP Verified by CM Yes   Last Visit to PCP Within last two years   Prior Functional Level Independent in ADLs/IADLs   Current Functional Level Assistance with the following:;Cooking;Housework;Mobility   Can patient return to prior living arrangement Unknown at present   Ability to make needs known: Fair   Family able  to assist with home care needs: No   Would you like for me to discuss the discharge plan with any other family members/significant others, and if so, who? No   Financial Resources Medicaid   Community Resources Transportation   CM/SW Referral DME   Social/Functional History   Lives With Alone   Type of Home House   Home Layout Two level   Active  No   Discharge Planning   Type of Residence House   Living Arrangements Alone   Current Services Prior To Admission Durable Medical Equipment  (has back brace)   Current DME Prior to Arrival Other (Comment)  (back brace)   Potential Assistance Needed N/A   DME Ordered? No   Potential Assistance Purchasing Medications No   Type of Home Care Services None   Patient expects to be discharged to: Unknown  ([employer] in room, pt hand cuffed to bed)   Follow Up Appointment: Best Day/Time  Wednesday PM   One/Two Story Residence Two story   Lift Chair Available No   History of falls? 1   Services At/After Discharge   Transition of Care Consult (CM Consult) Discharge Planning   Services At/After Discharge PT;OT  (unsure of disposition since [employer] here)   Confirm Follow Up Transport Family   Condition of Participation: Discharge Planning   The Plan for Transition of Care is related to the following treatment goals: pain control mainly

## 2024-08-21 NOTE — PROGRESS NOTES
Transfer  Report from Facility [facility]  Referring Physician Brianna Arzate  Accepting Physician Dr Glasgow  Patient Condition:   [birth year]. 48 yo at [facility] presents to ER after developing Intractable back pain while in court today. He was here one month ago with osteomyelitis and discharged to [facility] for continued antibiotics. He left [facility] without finishing the antibiotics. While he was in court today he developed intractable back pain that shoots down his leg and into his testicles. His labs on arrival are unremarkable, but K is 3.1 and it was replaced with Oral K. He is a known drug user, stated he hasn't used for a while but was positive for Amphetamines and Methamphetamines.. CT of back shows Disco-vertebral osteomyelitis of L2-L3. Vitals 98.4 HR 94 resp 16, b/p 150/100 96% on RA. Would like to bring him to 7 with diagnosis of Intractable back pain. [facility] does not have Infectious Disease coverage and are not comfortable keeping him.     Accepted  on behalf of Dr Glasgow to 7K2 with diagnosis of Intractable back pain

## 2024-08-21 NOTE — CONSULTS
CONSULTATION NOTE :ID       Patient - Jonathan Goldsmith,  Age - 49 y.o.    - 1975      Room Number - 7K-24/024-A   MRN -  [id number]   [facility] # - [id number]  Date of Admission -  2024  1:26 AM  Patient's PCP: Lan Davenport MD     Requesting Physician: Ely Francois PA-C    REASON FOR CONSULTATION   Back pain concern for discitis  CHIEF COMPLAINT   Worsening back pain    HISTORY OF PRESENT ILLNESS       This is a very pleasant 49 y.o. male who was admitted to the hospital with a chief complaints of worsening back pain.  Patient was recently evaluated at this hospital for lumbar discitis/osteomyelitis.  Decision was made to treat patient with IV antibiotics due to his previous history of discitis and osteomyelitis.  He was sent to [facility] unfortunately patient was discharged early as he signed out AGAINST MEDICAL ADVICE.  He has a long history of drug use was previously treated for discitis and osteomyelitis of his spine he denies any fever or chills.  He has shooting pain on his right leg.  He reports that he has weakness on the right leg however staff reports that he was able to walk to the restroom.  He denies any bowel or urine incontinence.  No night sweats.    PAST MEDICAL  HISTORY       Past Medical History:   Diagnosis Date    Anemia     Discitis of lumbar region 2017    Epidural abscess     IVDU (intravenous drug user)     Osteomyelitis of spine (HCC)  multilevels  from t11 to l5 2017       PAST SURGICAL HISTORY     Past Surgical History:   Procedure Laterality Date    BACK SURGERY  2017    L5-S1 bilateral repeat laminectomies with resection of epidural granulation tissue and bilateral L5-S1 microdiscectomy  on 17    BACK SURGERY  2017    Lumbar laminectomy, L3-S1 decompression, PSF, lumbar incision and drainage--Dr Barroso    CHOLECYSTECTOMY      LUMBAR LAMINECTOMY  2016    L5 - S1 for abcess         MEDICATIONS:        Scheduled Meds:   sodium chloride flush  5-40 mL IntraVENous 2 times per day    enoxaparin  40 mg SubCUTAneous Daily    diclofenac sodium  2 g Topical BID    dexAMETHasone  4 mg IntraVENous Q6H    cefepime  2,000 mg IntraVENous Q8H    nicotine  1 patch TransDERmal Daily     Continuous Infusions:   sodium chloride 75 mL/hr at 08/21/24 0247    sodium chloride 75 mL/hr at 08/21/24 0250     PRN Meds:sodium chloride flush, sodium chloride, potassium chloride **OR** potassium alternative oral replacement **OR** potassium chloride, magnesium sulfate, ondansetron **OR** ondansetron, polyethylene glycol, acetaminophen **OR** acetaminophen, oxyCODONE **OR** oxyCODONE, cyclobenzaprine  Allergies:   ALLERGIES:    Morphine        SOCIAL HISTORY:     TOBACCO:   reports that he has quit smoking. His smoking use included cigarettes. His smokeless tobacco use includes chew.     ETOH:   reports current alcohol use.  Patient currently lives with family       FAMILY HISTORY:         Problem Relation Age of Onset    High Blood Pressure Mother     High Blood Pressure Father        REVIEW OF SYSTEMS:     Constitutional: no fever, no night sweats, no fatigue, no weight loss.  Head: no head ache , no head injury, no migranes.  Eye: no eye discharge, blurring of vision, no double vision,no eye pain.  Ears: no hearing difficulty, no tinnitus  Mouth/throat: no ulceration, dental caries , dysphagia, no hoarseness and voice change  Respiratory: no cough no chest pain,no shortness of breath,no wheezing  CVS: no palpitation, no chest pain,   GI: no abdominal pain, no nausea , no vomiting, no constipation,no diarrhea.  UMAIR: no dysuria, frequency and urgency, no hematuria, no kidney stones  Musculoskeletal: As noted in HPI endocrine: no polyuria, polydipsia, no cold or heat intolerance  Hematology: no anemia, no easy brusing or bleeding, no hx of clotting disorder  Dermatology: no skin rash, no skin lesions, no pruritis,  Neurological:no  back pain M54.9           Impression and Recommendation:   Lower back pain with history of discitis and osteomyelitis  History of IV drug use with previous history of osteomyelitis of the spine.  Recently treated for the same complaints  Recommend to do MRI with and without contrast to evaluate the lumbar spine  Repeat CRP and ESR  Based on diagnostic tests further recommendation will follow.  Patient has been noncompliant with IV treatment.      Thank you Ely Francois PA-C for allowing me to participate in this patient's care.    Romulo Carroll MD, 8/21/2024 5:33 PM

## 2024-08-21 NOTE — PROGRESS NOTES
Changed cefepime to 2000mg x 1 then 2000mg q8h for bone/joint infection and suspected osteomyelitis.    Char Sweeney [facility], BCPS, BCGP  8/21/2024     2:56 AM

## 2024-08-21 NOTE — PLAN OF CARE
Pt admitted early this AM 8/21 at 2:07 AM for back pain.    Pt reportedly left [facility] pre-maturely from his antibiotic regimen. States he did not have prescribed pain medications for his back, but took similarly pain meds at home with poor improvement.     Spoke with pt today,  noted laying in couch at bedside.   ID consult placed- Dr. Carroll made aware. Cultures pending, on Cefepime at this time. Pt reports adequate pain control at this time.       To note, pt is Hx of IVDA.     Electronically signed by Ely Francois PA-C on 8/21/2024 at 2:02 PM

## 2024-08-21 NOTE — CARE COORDINATION
08/21/24 1307   Readmission Assessment   Number of Days since last admission? 8-30 days   Previous Disposition Home Alone   Who is being Interviewed Patient   What was the patient's/caregiver's perception as to why they think they needed to return back to the hospital? Other (Comment)  (back to hospital with numbness and tingling down my legs got worse)   Did you visit your Primary Care Physician after you left the hospital, before you returned this time? No   Why weren't you able to visit your PCP? Other (Comment)  (did not go, was stubborn)   Did you see a specialist, such as Cardiac, Pulmonary, Orthopedic Physician, etc. after you left the hospital? No   Who advised the patient to return to the hospital? Self-referral   Does the patient report anything that got in the way of taking their medications? No   In our efforts to provide the best possible care to you and others like you, can you think of anything that we could have done to help you after you left the hospital the first time, so that you might not have needed to return so soon? Other (Comment)  (no none)

## 2024-08-21 NOTE — PLAN OF CARE
Problem: Discharge Planning  Goal: Discharge to home or other facility with appropriate resources  Outcome: Progressing     Problem: Pain  Goal: Verbalizes/displays adequate comfort level or baseline comfort level  Outcome: Progressing     Problem: ABCDS Injury Assessment  Goal: Absence of physical injury  Outcome: Progressing  Flowsheets (Taken 8/21/2024 1747)  Absence of Physical Injury: Implement safety measures based on patient assessment     Problem: Skin/Tissue Integrity - Adult  Goal: Skin integrity remains intact  Outcome: Progressing  Flowsheets (Taken 8/21/2024 1747)  Skin Integrity Remains Intact: Monitor for areas of redness and/or skin breakdown     Problem: Musculoskeletal - Adult  Goal: Return mobility to safest level of function  Outcome: Progressing  Flowsheets (Taken 8/21/2024 1747)  Return Mobility to Safest Level of Function: Assess patient stability and activity tolerance for standing, transferring and ambulating with or without assistive devices     Problem: Gastrointestinal - Adult  Goal: Maintains or returns to baseline bowel function  Outcome: Progressing  Flowsheets (Taken 8/21/2024 1747)  Maintains or returns to baseline bowel function: Assess bowel function     Problem: Metabolic/Fluid and Electrolytes - Adult  Goal: Electrolytes maintained within normal limits  Outcome: Progressing  Flowsheets (Taken 8/21/2024 1747)  Electrolytes maintained within normal limits: Monitor labs and assess patient for signs and symptoms of electrolyte imbalances   Care plan reviewed with patient.  Patient verbalize understanding of the plan of care and contribute to goal setting.

## 2024-08-22 ENCOUNTER — APPOINTMENT (OUTPATIENT)
Dept: MRI IMAGING | Age: 49
End: 2024-08-22
Attending: STUDENT IN AN ORGANIZED HEALTH CARE EDUCATION/TRAINING PROGRAM
Payer: COMMERCIAL

## 2024-08-22 LAB
ANION GAP SERPL CALC-SCNC: 11 MEQ/L (ref 8–16)
BASOPHILS ABSOLUTE: 0 THOU/MM3 (ref 0–0.1)
BASOPHILS NFR BLD AUTO: 0.1 %
BUN SERPL-MCNC: 15 MG/DL (ref 7–22)
CALCIUM SERPL-MCNC: 8.6 MG/DL (ref 8.5–10.5)
CHLORIDE SERPL-SCNC: 104 MEQ/L (ref 98–111)
CO2 SERPL-SCNC: 23 MEQ/L (ref 23–33)
CREAT SERPL-MCNC: 0.8 MG/DL (ref 0.4–1.2)
CRP SERPL-MCNC: 1.11 MG/DL (ref 0–1)
DEPRECATED RDW RBC AUTO: 41 FL (ref 35–45)
EOSINOPHIL NFR BLD AUTO: 0 %
EOSINOPHILS ABSOLUTE: 0 THOU/MM3 (ref 0–0.4)
ERYTHROCYTE [DISTWIDTH] IN BLOOD BY AUTOMATED COUNT: 13.9 % (ref 11.5–14.5)
ERYTHROCYTE [SEDIMENTATION RATE] IN BLOOD BY WESTERGREN METHOD: 10 MM/HR (ref 0–10)
GFR SERPL CREATININE-BSD FRML MDRD: > 90 ML/MIN/1.73M2
GLUCOSE SERPL-MCNC: 117 MG/DL (ref 70–108)
HCT VFR BLD AUTO: 38.8 % (ref 42–52)
HGB BLD-MCNC: 12.5 GM/DL (ref 14–18)
IMM GRANULOCYTES # BLD AUTO: 0.05 THOU/MM3 (ref 0–0.07)
IMM GRANULOCYTES NFR BLD AUTO: 0.5 %
LACTATE SERPL-SCNC: 0.7 MMOL/L (ref 0.5–2)
LYMPHOCYTES ABSOLUTE: 1 THOU/MM3 (ref 1–4.8)
LYMPHOCYTES NFR BLD AUTO: 9.2 %
MCH RBC QN AUTO: 26.5 PG (ref 26–33)
MCHC RBC AUTO-ENTMCNC: 32.2 GM/DL (ref 32.2–35.5)
MCV RBC AUTO: 82.4 FL (ref 80–94)
MONOCYTES ABSOLUTE: 0.3 THOU/MM3 (ref 0.4–1.3)
MONOCYTES NFR BLD AUTO: 2.8 %
NEUTROPHILS ABSOLUTE: 9.1 THOU/MM3 (ref 1.8–7.7)
NEUTROPHILS NFR BLD AUTO: 87.4 %
NRBC BLD AUTO-RTO: 0 /100 WBC
PLATELET # BLD AUTO: 348 THOU/MM3 (ref 130–400)
PMV BLD AUTO: 9.7 FL (ref 9.4–12.4)
POTASSIUM SERPL-SCNC: 4.9 MEQ/L (ref 3.5–5.2)
RBC # BLD AUTO: 4.71 MILL/MM3 (ref 4.7–6.1)
SODIUM SERPL-SCNC: 138 MEQ/L (ref 135–145)
WBC # BLD AUTO: 10.4 THOU/MM3 (ref 4.8–10.8)

## 2024-08-22 PROCEDURE — 85025 COMPLETE CBC W/AUTO DIFF WBC: CPT

## 2024-08-22 PROCEDURE — 6360000004 HC RX CONTRAST MEDICATION: Performed by: INTERNAL MEDICINE

## 2024-08-22 PROCEDURE — 36415 COLL VENOUS BLD VENIPUNCTURE: CPT

## 2024-08-22 PROCEDURE — 83605 ASSAY OF LACTIC ACID: CPT

## 2024-08-22 PROCEDURE — 6370000000 HC RX 637 (ALT 250 FOR IP)

## 2024-08-22 PROCEDURE — 99232 SBSQ HOSP IP/OBS MODERATE 35: CPT

## 2024-08-22 PROCEDURE — 2580000003 HC RX 258

## 2024-08-22 PROCEDURE — 6360000002 HC RX W HCPCS

## 2024-08-22 PROCEDURE — 80048 BASIC METABOLIC PNL TOTAL CA: CPT

## 2024-08-22 PROCEDURE — 85651 RBC SED RATE NONAUTOMATED: CPT

## 2024-08-22 PROCEDURE — 1200000003 HC TELEMETRY R&B

## 2024-08-22 PROCEDURE — 86140 C-REACTIVE PROTEIN: CPT

## 2024-08-22 PROCEDURE — A9579 GAD-BASE MR CONTRAST NOS,1ML: HCPCS | Performed by: INTERNAL MEDICINE

## 2024-08-22 PROCEDURE — 72158 MRI LUMBAR SPINE W/O & W/DYE: CPT

## 2024-08-22 RX ORDER — HYDROXYZINE PAMOATE 50 MG/1
50 CAPSULE ORAL 3 TIMES DAILY PRN
Status: DISCONTINUED | OUTPATIENT
Start: 2024-08-22 | End: 2024-08-23 | Stop reason: HOSPADM

## 2024-08-22 RX ADMIN — ENOXAPARIN SODIUM 40 MG: 100 INJECTION SUBCUTANEOUS at 08:05

## 2024-08-22 RX ADMIN — OXYCODONE HYDROCHLORIDE 10 MG: 5 TABLET ORAL at 03:39

## 2024-08-22 RX ADMIN — SODIUM CHLORIDE, PRESERVATIVE FREE 10 ML: 5 INJECTION INTRAVENOUS at 08:06

## 2024-08-22 RX ADMIN — OXYCODONE HYDROCHLORIDE 10 MG: 5 TABLET ORAL at 20:51

## 2024-08-22 RX ADMIN — CYCLOBENZAPRINE 10 MG: 10 TABLET, FILM COATED ORAL at 16:01

## 2024-08-22 RX ADMIN — CEFEPIME 2000 MG: 2 INJECTION, POWDER, FOR SOLUTION INTRAVENOUS at 03:43

## 2024-08-22 RX ADMIN — HYDROXYZINE PAMOATE 50 MG: 50 CAPSULE ORAL at 17:27

## 2024-08-22 RX ADMIN — DEXAMETHASONE SODIUM PHOSPHATE 4 MG: 4 INJECTION, SOLUTION INTRA-ARTICULAR; INTRALESIONAL; INTRAMUSCULAR; INTRAVENOUS; SOFT TISSUE at 03:39

## 2024-08-22 RX ADMIN — DEXAMETHASONE SODIUM PHOSPHATE 4 MG: 4 INJECTION, SOLUTION INTRA-ARTICULAR; INTRALESIONAL; INTRAMUSCULAR; INTRAVENOUS; SOFT TISSUE at 20:51

## 2024-08-22 RX ADMIN — DEXAMETHASONE SODIUM PHOSPHATE 4 MG: 4 INJECTION, SOLUTION INTRA-ARTICULAR; INTRALESIONAL; INTRAMUSCULAR; INTRAVENOUS; SOFT TISSUE at 08:05

## 2024-08-22 RX ADMIN — CEFEPIME 2000 MG: 2 INJECTION, POWDER, FOR SOLUTION INTRAVENOUS at 20:56

## 2024-08-22 RX ADMIN — DEXAMETHASONE SODIUM PHOSPHATE 4 MG: 4 INJECTION, SOLUTION INTRA-ARTICULAR; INTRALESIONAL; INTRAMUSCULAR; INTRAVENOUS; SOFT TISSUE at 16:02

## 2024-08-22 RX ADMIN — OXYCODONE HYDROCHLORIDE 10 MG: 5 TABLET ORAL at 16:01

## 2024-08-22 RX ADMIN — ACETAMINOPHEN 650 MG: 325 TABLET ORAL at 08:05

## 2024-08-22 RX ADMIN — CEFEPIME 2000 MG: 2 INJECTION, POWDER, FOR SOLUTION INTRAVENOUS at 13:31

## 2024-08-22 RX ADMIN — SODIUM CHLORIDE, PRESERVATIVE FREE 10 ML: 5 INJECTION INTRAVENOUS at 20:49

## 2024-08-22 RX ADMIN — GADOTERIDOL 20 ML: 279.3 INJECTION, SOLUTION INTRAVENOUS at 19:12

## 2024-08-22 RX ADMIN — SODIUM CHLORIDE: 9 INJECTION, SOLUTION INTRAVENOUS at 20:54

## 2024-08-22 RX ADMIN — OXYCODONE HYDROCHLORIDE 10 MG: 5 TABLET ORAL at 08:05

## 2024-08-22 ASSESSMENT — PAIN SCALES - WONG BAKER
WONGBAKER_NUMERICALRESPONSE: NO HURT
WONGBAKER_NUMERICALRESPONSE: NO HURT

## 2024-08-22 ASSESSMENT — PAIN DESCRIPTION - ONSET
ONSET: ON-GOING

## 2024-08-22 ASSESSMENT — PAIN DESCRIPTION - PAIN TYPE
TYPE: ACUTE PAIN
TYPE: ACUTE PAIN
TYPE: CHRONIC PAIN
TYPE: CHRONIC PAIN

## 2024-08-22 ASSESSMENT — PAIN DESCRIPTION - DESCRIPTORS
DESCRIPTORS: ACHING
DESCRIPTORS: ACHING
DESCRIPTORS: BURNING
DESCRIPTORS: BURNING

## 2024-08-22 ASSESSMENT — PAIN - FUNCTIONAL ASSESSMENT
PAIN_FUNCTIONAL_ASSESSMENT: ACTIVITIES ARE NOT PREVENTED

## 2024-08-22 ASSESSMENT — PAIN SCALES - GENERAL
PAINLEVEL_OUTOF10: 7
PAINLEVEL_OUTOF10: 7
PAINLEVEL_OUTOF10: 4
PAINLEVEL_OUTOF10: 8
PAINLEVEL_OUTOF10: 8

## 2024-08-22 ASSESSMENT — PAIN DESCRIPTION - ORIENTATION
ORIENTATION: RIGHT
ORIENTATION: RIGHT;MID
ORIENTATION: RIGHT
ORIENTATION: RIGHT;MID

## 2024-08-22 ASSESSMENT — PAIN DESCRIPTION - FREQUENCY
FREQUENCY: CONTINUOUS

## 2024-08-22 ASSESSMENT — PAIN DESCRIPTION - LOCATION
LOCATION: BACK;LEG

## 2024-08-22 NOTE — PROGRESS NOTES
Progress note: Infectious diseases    Patient - Jonathan Goldsmith,  Age - 49 y.o.    - 1975      Room Number - 7K-24/024-A   MRN -  [id number]   [facility] # - [id number]  Date of Admission -  2024  1:26 AM    SUBJECTIVE:   Patient seen and evaluated at bedside this morning. Patient reports ongoing back and bilateral leg pain with R>L. Patient denies and shakes/fever/chills or other symptoms.   OBJECTIVE   VITALS    height is 1.88 m (6' 2.02\") and weight is 95.8 kg (211 lb 3.2 oz). His oral temperature is 97.8 °F (36.6 °C). His blood pressure is 125/73 and his pulse is 62. His respiration is 16 and oxygen saturation is 96%.       Wt Readings from Last 3 Encounters:   24 95.8 kg (211 lb 3.2 oz)   24 104.3 kg (230 lb)   24 104.3 kg (230 lb)       I/O (24 Hours)    Intake/Output Summary (Last 24 hours) at 2024 0726  Last data filed at 2024 0629  Gross per 24 hour   Intake 2507.22 ml   Output --   Net 2507.22 ml       General Appearance  Awake, alert, oriented,  not  In acute distress  HEENT - normocephalic, atraumatic, pink conjunctiva,  anicteric sclera  Neck - Supple, no mass  Lungs -  Bilateral good air entry, no rhonchi, no wheeze  Cardiovascular - Heart sounds are normal.  Regular rate and rhythm without murmur, gallop or rub.  Abdomen - soft, not distended, nontender,   Neurologic -Alert and oriented to place, person, time. Weakness of lower extremities with R>L.  Skin - No bruising or bleeding  Extremities - No edema, no cyanosis, clubbing     MEDICATIONS:      sodium chloride flush  5-40 mL IntraVENous 2 times per day    enoxaparin  40 mg SubCUTAneous Daily    diclofenac sodium  2 g Topical BID    dexAMETHasone  4 mg IntraVENous Q6H    cefepime  2,000 mg IntraVENous Q8H    nicotine  1 patch TransDERmal Daily      sodium chloride Stopped (24 0249)    sodium chloride 75 mL/hr at 24  0336     sodium chloride flush, sodium chloride, potassium chloride **OR** potassium alternative oral replacement **OR** potassium chloride, magnesium sulfate, ondansetron **OR** ondansetron, polyethylene glycol, acetaminophen **OR** acetaminophen, oxyCODONE **OR** oxyCODONE, cyclobenzaprine      LABS:     CBC:   Recent Labs     08/21/24  0600 08/22/24  0633   WBC 6.8 10.4   HGB 12.7* 12.5*    348     BMP:    Recent Labs     08/21/24  0600      K 4.1      CO2 21*   BUN 10   CREATININE 0.6   GLUCOSE 102     Calcium:  Recent Labs     08/21/24  0600   CALCIUM 8.3*     Ionized Calcium:Invalid input(s): \"IONCA\"  Magnesium:No results for input(s): \"MG\" in the last 72 hours.  Phosphorus:No results for input(s): \"PHOS\" in the last 72 hours.  BNP:No results for input(s): \"BNP\" in the last 72 hours.  Glucose:No results for input(s): \"POCGLU\" in the last 72 hours.  HgbA1C: No results for input(s): \"LABA1C\" in the last 72 hours.  INR: No results for input(s): \"INR\" in the last 72 hours.  Hepatic:   Recent Labs     08/21/24  0600   ALKPHOS 83   ALT 10*   AST 16   BILITOT 0.5     Amylase and Lipase:  Recent Labs     08/22/24  0633   LACTA 0.7     Lactic Acid:   Recent Labs     08/22/24  0633   LACTA 0.7     Troponin: No results for input(s): \"CKTOTAL\", \"CKMB\", \"TROPONINI\" in the last 72 hours.  BNP: No results for input(s): \"BNP\" in the last 72 hours.    CULTURES:   UA: No results for input(s): \"SPECGRAV\", \"PHUR\", \"COLORU\", \"CLARITYU\", \"MUCUS\", \"PROTEINU\", \"BLOODU\", \"RBCUA\", \"WBCUA\", \"BACTERIA\", \"NITRU\", \"GLUCOSEU\", \"BILIRUBINUR\", \"UROBILINOGEN\", \"KETUA\", \"LABCAST\", \"LABCASTTY\", \"AMORPHOS\" in the last 72 hours.    Invalid input(s): \"CRYSTALS\"  Micro:   Lab Results   Component Value Date/Time    BC No growth 24 hours. 08/21/2024 06:10 AM   \    Problem list of patient:     Patient Active Problem List   Diagnosis Code    Epidural abscess  drained G06.2    Opiate abuse, continuous (HCC) F11.10    Hypertension I10

## 2024-08-22 NOTE — PLAN OF CARE
Problem: Discharge Planning  Goal: Discharge to home or other facility with appropriate resources  Outcome: Progressing  Flowsheets (Taken 8/22/2024 0745)  Discharge to home or other facility with appropriate resources:   Identify barriers to discharge with patient and caregiver   Arrange for needed discharge resources and transportation as appropriate     Problem: Pain  Goal: Verbalizes/displays adequate comfort level or baseline comfort level  Outcome: Progressing     Problem: ABCDS Injury Assessment  Goal: Absence of physical injury  Outcome: Progressing  Flowsheets (Taken 8/22/2024 0745)  Absence of Physical Injury: Implement safety measures based on patient assessment     Problem: Skin/Tissue Integrity - Adult  Goal: Skin integrity remains intact  Outcome: Progressing  Flowsheets (Taken 8/22/2024 0745)  Skin Integrity Remains Intact: Monitor for areas of redness and/or skin breakdown     Problem: Musculoskeletal - Adult  Goal: Return mobility to safest level of function  Outcome: Progressing  Flowsheets (Taken 8/22/2024 0745)  Return Mobility to Safest Level of Function: Assess patient stability and activity tolerance for standing, transferring and ambulating with or without assistive devices     Problem: Gastrointestinal - Adult  Goal: Maintains or returns to baseline bowel function  Outcome: Progressing     Problem: Metabolic/Fluid and Electrolytes - Adult  Goal: Electrolytes maintained within normal limits  Outcome: Progressing  Flowsheets (Taken 8/22/2024 0745)  Electrolytes maintained within normal limits: Monitor labs and assess patient for signs and symptoms of electrolyte imbalances   Care plan reviewed with patient.  Patient verbalizes understanding of the plan of care and contribute to goal setting.

## 2024-08-22 NOTE — CARE COORDINATION
8/22/24, 1:28 PM EDT    DISCHARGE ON GOING EVALUATION    Jonathan MACHUCA [facility] day: 1  Location: [facility]24/024-A Reason for admit: Intractable back pain [M54.9]     Procedures: na    Imaging since last note: MRI lumbar spine pending    Barriers to Discharge: ID and hospitalist, conts on IVF 75/hr, Cefepime IV, IV Decadron, Flexeril, CRP repeated 1.11.    PCP: Lan Davenport MD  Readmission Risk Score: 12.7    Patient Goals/Plan/Treatment Preferences: currently with [employer] officer at pt bedside; discharge planning ongoing.

## 2024-08-22 NOTE — PLAN OF CARE
Problem: Discharge Planning  Goal: Discharge to home or other facility with appropriate resources  8/21/2024 2222 by Apple Miller RN  Outcome: Progressing  Flowsheets (Taken 8/21/2024 2019)  Discharge to home or other facility with appropriate resources: Identify barriers to discharge with patient and caregiver  8/21/2024 1747 by Erendira De Leon RN  Outcome: Progressing     Problem: Pain  Goal: Verbalizes/displays adequate comfort level or baseline comfort level  8/21/2024 2222 by Apple Miller RN  Outcome: Progressing  Flowsheets (Taken 8/21/2024 2120)  Verbalizes/displays adequate comfort level or baseline comfort level: Encourage patient to monitor pain and request assistance  8/21/2024 1747 by Erendira De Leon RN  Outcome: Progressing     Problem: ABCDS Injury Assessment  Goal: Absence of physical injury  8/21/2024 2222 by Apple Miller RN  Outcome: Progressing  Flowsheets (Taken 8/21/2024 1747 by Erendira De Leon RN)  Absence of Physical Injury: Implement safety measures based on patient assessment  8/21/2024 1747 by Erendira De Leon RN  Outcome: Progressing  Flowsheets (Taken 8/21/2024 1747)  Absence of Physical Injury: Implement safety measures based on patient assessment     Problem: Skin/Tissue Integrity - Adult  Goal: Skin integrity remains intact  8/21/2024 2222 by Apple Miller RN  Outcome: Progressing  Flowsheets (Taken 8/21/2024 2019)  Skin Integrity Remains Intact: Monitor for areas of redness and/or skin breakdown  8/21/2024 1747 by Erendira De Leon RN  Outcome: Progressing  Flowsheets (Taken 8/21/2024 1747)  Skin Integrity Remains Intact: Monitor for areas of redness and/or skin breakdown     Problem: Musculoskeletal - Adult  Goal: Return mobility to safest level of function  8/21/2024 2222 by Apple Miller RN  Outcome: Progressing  Flowsheets (Taken 8/21/2024 2019)  Return Mobility to Safest Level of Function: Assess patient stability and activity tolerance for standing, transferring and  ambulating with or without assistive devices  8/21/2024 1747 by Erendira De Leon RN  Outcome: Progressing  Flowsheets (Taken 8/21/2024 1747)  Return Mobility to Safest Level of Function: Assess patient stability and activity tolerance for standing, transferring and ambulating with or without assistive devices     Problem: Gastrointestinal - Adult  Goal: Maintains or returns to baseline bowel function  8/21/2024 2222 by Apple Miller RN  Outcome: Progressing  Flowsheets (Taken 8/21/2024 2019)  Maintains or returns to baseline bowel function: Assess bowel function  8/21/2024 1747 by Erendira De Leon RN  Outcome: Progressing  Flowsheets (Taken 8/21/2024 1747)  Maintains or returns to baseline bowel function: Assess bowel function     Problem: Metabolic/Fluid and Electrolytes - Adult  Goal: Electrolytes maintained within normal limits  8/21/2024 2222 by Apple Miller RN  Outcome: Progressing  Flowsheets (Taken 8/21/2024 2019)  Electrolytes maintained within normal limits: Monitor labs and assess patient for signs and symptoms of electrolyte imbalances  8/21/2024 1747 by Erendira De Leon RN  Outcome: Progressing  Flowsheets (Taken 8/21/2024 1747)  Electrolytes maintained within normal limits: Monitor labs and assess patient for signs and symptoms of electrolyte imbalances

## 2024-08-22 NOTE — PROGRESS NOTES
Hospitalist Progress Note    Patient:  Jonathan Goldsmith    Unit/Bed:7K-24/024-A  YOB: 1975  MRN: [id number]   Acct: [id number]   PCP: Lan Davenport MD  Date of Admission: 8/21/2024    ASSESSMENT AND PLAN  Active Problems  Severe pack pain, with osteomyelitis of lumbar spine:   Pt reports  low back pain with radiation to right hip and occasionally to right knee, left hip. Recently [facility] without completing ABX course.   Hx of epidural abscess in 2017.   OSH CT lumbar spine shows osteomyelitis at L2-L3.   ID consulted. BC x 2 NGTD.   Received Vanc and Rocephin at OSH. Continue cefepime (started 8/21)   Repeat MRI ordered.   PRN Pain control with Tylenol, Gabapentin, voltaren gel, flexeril, dexatheasone, roxicodone.   Nursing communication placed for consideration for Fiatt if indicated.     Resolved Problems  Testicular pain- pt has had no complaints since admission.   Hypokalemia    Chronic Conditions (reviewed and stable unless otherwise stated):   Essential HTN:  Stable. Continue to monitor closely.  Substance abuse disorder: known IVD abuse. UDS  positive for amphetamines and methamphetamines at OSH, per chart review.  Tobacco abuse: Reports smoking 1 ppd. Nicotine patch ordered. Continue to encourage smoking cessation.       DVT Prophylaxis: [x] Lovenox / [] Heparin / [] SCDs / [] Already on Systemic Anticoagulation / [] None  LDA: []CVC / []PICC / []Midline / []Marquez / []Drains / []Mediport / [x]None  Antibiotics: yes   Steroids: yes  Labs (still needed?): [x]Yes / []No  IVF (still needed?): []Yes / [x]No    Level of care: []Step Down / [x]Med-Surg  Bed Status: [x]Inpatient / []Observation  Telemetry: []Yes / [x]No  PT/OT: []Yes / [x]No    Code status: Full Code     ===================================================================  Chief Complaint: Back pain  Initial HPI: History obtained per

## 2024-08-23 ENCOUNTER — TELEPHONE (OUTPATIENT)
Dept: FAMILY MEDICINE CLINIC | Age: 49
End: 2024-08-23

## 2024-08-23 VITALS
HEIGHT: 74 IN | SYSTOLIC BLOOD PRESSURE: 105 MMHG | RESPIRATION RATE: 16 BRPM | TEMPERATURE: 97.9 F | HEART RATE: 58 BPM | OXYGEN SATURATION: 99 % | WEIGHT: 232.81 LBS | DIASTOLIC BLOOD PRESSURE: 60 MMHG | BODY MASS INDEX: 29.88 KG/M2

## 2024-08-23 LAB
ANION GAP SERPL CALC-SCNC: 11 MEQ/L (ref 8–16)
BACTERIA BLD AEROBE CULT: NORMAL
BACTERIA BLD AEROBE CULT: NORMAL
BASOPHILS ABSOLUTE: 0 THOU/MM3 (ref 0–0.1)
BASOPHILS NFR BLD AUTO: 0.1 %
BUN SERPL-MCNC: 16 MG/DL (ref 7–22)
CALCIUM SERPL-MCNC: 8.3 MG/DL (ref 8.5–10.5)
CHLORIDE SERPL-SCNC: 105 MEQ/L (ref 98–111)
CO2 SERPL-SCNC: 23 MEQ/L (ref 23–33)
CREAT SERPL-MCNC: 0.6 MG/DL (ref 0.4–1.2)
DEPRECATED RDW RBC AUTO: 41.5 FL (ref 35–45)
EOSINOPHIL NFR BLD AUTO: 0 %
EOSINOPHILS ABSOLUTE: 0 THOU/MM3 (ref 0–0.4)
ERYTHROCYTE [DISTWIDTH] IN BLOOD BY AUTOMATED COUNT: 14.1 % (ref 11.5–14.5)
GFR SERPL CREATININE-BSD FRML MDRD: > 90 ML/MIN/1.73M2
GLUCOSE SERPL-MCNC: 109 MG/DL (ref 70–108)
HCT VFR BLD AUTO: 38.8 % (ref 42–52)
HGB BLD-MCNC: 12.6 GM/DL (ref 14–18)
IMM GRANULOCYTES # BLD AUTO: 0.05 THOU/MM3 (ref 0–0.07)
IMM GRANULOCYTES NFR BLD AUTO: 0.4 %
LYMPHOCYTES ABSOLUTE: 1.3 THOU/MM3 (ref 1–4.8)
LYMPHOCYTES NFR BLD AUTO: 11.9 %
MCH RBC QN AUTO: 26.7 PG (ref 26–33)
MCHC RBC AUTO-ENTMCNC: 32.5 GM/DL (ref 32.2–35.5)
MCV RBC AUTO: 82.2 FL (ref 80–94)
MONOCYTES ABSOLUTE: 0.5 THOU/MM3 (ref 0.4–1.3)
MONOCYTES NFR BLD AUTO: 4.9 %
NEUTROPHILS ABSOLUTE: 9.2 THOU/MM3 (ref 1.8–7.7)
NEUTROPHILS NFR BLD AUTO: 82.7 %
NRBC BLD AUTO-RTO: 0 /100 WBC
PLATELET # BLD AUTO: 311 THOU/MM3 (ref 130–400)
PMV BLD AUTO: 9.9 FL (ref 9.4–12.4)
POTASSIUM SERPL-SCNC: 4.1 MEQ/L (ref 3.5–5.2)
RBC # BLD AUTO: 4.72 MILL/MM3 (ref 4.7–6.1)
SODIUM SERPL-SCNC: 139 MEQ/L (ref 135–145)
WBC # BLD AUTO: 11.1 THOU/MM3 (ref 4.8–10.8)

## 2024-08-23 PROCEDURE — 36415 COLL VENOUS BLD VENIPUNCTURE: CPT

## 2024-08-23 PROCEDURE — 80048 BASIC METABOLIC PNL TOTAL CA: CPT

## 2024-08-23 PROCEDURE — 2580000003 HC RX 258

## 2024-08-23 PROCEDURE — 6370000000 HC RX 637 (ALT 250 FOR IP)

## 2024-08-23 PROCEDURE — 85025 COMPLETE CBC W/AUTO DIFF WBC: CPT

## 2024-08-23 PROCEDURE — 6360000002 HC RX W HCPCS

## 2024-08-23 PROCEDURE — 99239 HOSP IP/OBS DSCHRG MGMT >30: CPT

## 2024-08-23 RX ORDER — ENOXAPARIN SODIUM 100 MG/ML
30 INJECTION SUBCUTANEOUS EVERY 12 HOURS
Status: DISCONTINUED | OUTPATIENT
Start: 2024-08-23 | End: 2024-08-23 | Stop reason: HOSPADM

## 2024-08-23 RX ORDER — GABAPENTIN 300 MG/1
300 CAPSULE ORAL 2 TIMES DAILY
Qty: 14 CAPSULE | Refills: 0 | Status: SHIPPED | OUTPATIENT
Start: 2024-08-23 | End: 2024-08-23

## 2024-08-23 RX ORDER — GABAPENTIN 300 MG/1
300 CAPSULE ORAL 2 TIMES DAILY
Qty: 14 CAPSULE | Refills: 0 | Status: SHIPPED | OUTPATIENT
Start: 2024-08-23 | End: 2024-08-30

## 2024-08-23 RX ORDER — CYCLOBENZAPRINE HCL 10 MG
10 TABLET ORAL 2 TIMES DAILY PRN
Qty: 5 TABLET | Refills: 0 | Status: SHIPPED | OUTPATIENT
Start: 2024-08-23

## 2024-08-23 RX ORDER — DEXAMETHASONE 0.5 MG/1
1 TABLET ORAL DAILY
Qty: 4 TABLET | Refills: 0 | Status: SHIPPED | OUTPATIENT
Start: 2024-08-23 | End: 2024-08-25

## 2024-08-23 RX ORDER — SULFAMETHOXAZOLE/TRIMETHOPRIM 800-160 MG
1 TABLET ORAL EVERY 12 HOURS SCHEDULED
Qty: 20 TABLET | Refills: 0 | Status: SHIPPED | OUTPATIENT
Start: 2024-08-23 | End: 2024-08-23

## 2024-08-23 RX ORDER — LIDOCAINE 4 G/G
1 PATCH TOPICAL DAILY
Qty: 30 PATCH | Refills: 0 | Status: SHIPPED | OUTPATIENT
Start: 2024-08-23 | End: 2024-09-22

## 2024-08-23 RX ORDER — SULFAMETHOXAZOLE/TRIMETHOPRIM 800-160 MG
1 TABLET ORAL EVERY 12 HOURS SCHEDULED
Qty: 20 TABLET | Refills: 0 | Status: SHIPPED | OUTPATIENT
Start: 2024-08-23 | End: 2024-09-02

## 2024-08-23 RX ORDER — CYCLOBENZAPRINE HCL 10 MG
10 TABLET ORAL 2 TIMES DAILY PRN
Qty: 5 TABLET | Refills: 0 | Status: SHIPPED | OUTPATIENT
Start: 2024-08-23 | End: 2024-08-23

## 2024-08-23 RX ORDER — DEXAMETHASONE 0.5 MG/1
1 TABLET ORAL DAILY
Qty: 4 TABLET | Refills: 0 | Status: SHIPPED | OUTPATIENT
Start: 2024-08-23 | End: 2024-08-23

## 2024-08-23 RX ORDER — KETOROLAC TROMETHAMINE 10 MG/1
10 TABLET, FILM COATED ORAL EVERY 6 HOURS PRN
Qty: 12 TABLET | Refills: 0 | Status: SHIPPED | OUTPATIENT
Start: 2024-08-23 | End: 2024-08-26

## 2024-08-23 RX ORDER — KETOROLAC TROMETHAMINE 10 MG/1
10 TABLET, FILM COATED ORAL EVERY 6 HOURS PRN
Qty: 12 TABLET | Refills: 0 | Status: SHIPPED | OUTPATIENT
Start: 2024-08-23 | End: 2024-08-23

## 2024-08-23 RX ORDER — SULFAMETHOXAZOLE/TRIMETHOPRIM 800-160 MG
1 TABLET ORAL EVERY 12 HOURS SCHEDULED
Status: DISCONTINUED | OUTPATIENT
Start: 2024-08-23 | End: 2024-08-23 | Stop reason: HOSPADM

## 2024-08-23 RX ORDER — LIDOCAINE 4 G/G
1 PATCH TOPICAL DAILY
Qty: 30 PATCH | Refills: 0 | Status: SHIPPED | OUTPATIENT
Start: 2024-08-23 | End: 2024-08-23

## 2024-08-23 RX ADMIN — DEXAMETHASONE SODIUM PHOSPHATE 4 MG: 4 INJECTION, SOLUTION INTRA-ARTICULAR; INTRALESIONAL; INTRAMUSCULAR; INTRAVENOUS; SOFT TISSUE at 09:03

## 2024-08-23 RX ADMIN — SULFAMETHOXAZOLE AND TRIMETHOPRIM 1 TABLET: 800; 160 TABLET ORAL at 12:44

## 2024-08-23 RX ADMIN — CEFEPIME 2000 MG: 2 INJECTION, POWDER, FOR SOLUTION INTRAVENOUS at 05:30

## 2024-08-23 RX ADMIN — DEXAMETHASONE SODIUM PHOSPHATE 4 MG: 4 INJECTION, SOLUTION INTRA-ARTICULAR; INTRALESIONAL; INTRAMUSCULAR; INTRAVENOUS; SOFT TISSUE at 05:28

## 2024-08-23 RX ADMIN — SODIUM CHLORIDE: 9 INJECTION, SOLUTION INTRAVENOUS at 05:29

## 2024-08-23 RX ADMIN — OXYCODONE HYDROCHLORIDE 10 MG: 5 TABLET ORAL at 16:03

## 2024-08-23 RX ADMIN — ENOXAPARIN SODIUM 30 MG: 100 INJECTION SUBCUTANEOUS at 09:03

## 2024-08-23 RX ADMIN — CYCLOBENZAPRINE 10 MG: 10 TABLET, FILM COATED ORAL at 05:33

## 2024-08-23 RX ADMIN — OXYCODONE HYDROCHLORIDE 10 MG: 5 TABLET ORAL at 09:33

## 2024-08-23 RX ADMIN — OXYCODONE HYDROCHLORIDE 10 MG: 5 TABLET ORAL at 05:33

## 2024-08-23 ASSESSMENT — PAIN DESCRIPTION - ORIENTATION
ORIENTATION: RIGHT

## 2024-08-23 ASSESSMENT — PAIN SCALES - GENERAL
PAINLEVEL_OUTOF10: 9
PAINLEVEL_OUTOF10: 7

## 2024-08-23 ASSESSMENT — PAIN DESCRIPTION - DESCRIPTORS
DESCRIPTORS: BURNING
DESCRIPTORS: ACHING

## 2024-08-23 ASSESSMENT — PAIN DESCRIPTION - LOCATION
LOCATION: LEG
LOCATION: BACK;LEG
LOCATION: LEG

## 2024-08-23 ASSESSMENT — PAIN DESCRIPTION - FREQUENCY: FREQUENCY: CONTINUOUS

## 2024-08-23 ASSESSMENT — PAIN DESCRIPTION - ONSET: ONSET: ON-GOING

## 2024-08-23 ASSESSMENT — PAIN - FUNCTIONAL ASSESSMENT
PAIN_FUNCTIONAL_ASSESSMENT: ACTIVITIES ARE NOT PREVENTED

## 2024-08-23 ASSESSMENT — PAIN DESCRIPTION - PAIN TYPE
TYPE: CHRONIC PAIN
TYPE: ACUTE PAIN

## 2024-08-23 NOTE — CARE COORDINATION
8/23/24, 10:09 AM EDT    Patient goals/plan/ treatment preferences discussed by  and .  Patient goals/plan/ treatment preferences reviewed with patient/ family.  Patient/ family verbalize understanding of discharge plan and are in agreement with goal/plan/treatment preferences.  Understanding was demonstrated using the teach back method.  AVS provided by RN at time of discharge, which includes all necessary medical information pertaining to the patients current course of illness, treatment, post-discharge goals of care, and treatment preferences.     Services At/After Discharge: Outpatient    Plan discharge today; [employer] officer Noreen meadows. She shared okay for pt sent home and has court hearing Monday.

## 2024-08-23 NOTE — PLAN OF CARE
Problem: Discharge Planning  Goal: Discharge to home or other facility with appropriate resources  8/23/2024 1115 by Reena Dorsey RN  Outcome: Completed     Problem: Pain  Goal: Verbalizes/displays adequate comfort level or baseline comfort level  8/23/2024 1115 by Reena Dorsey RN  Outcome: Completed     Problem: ABCDS Injury Assessment  Goal: Absence of physical injury  8/23/2024 1115 by Reena Dorsey RN  Outcome: Completed     Problem: Skin/Tissue Integrity - Adult  Goal: Skin integrity remains intact  8/23/2024 1115 by Reena Dorsey RN  Outcome: Completed  Flowsheets (Taken 8/23/2024 1114)  Skin Integrity Remains Intact: Monitor for areas of redness and/or skin breakdown     Problem: Musculoskeletal - Adult  Goal: Return mobility to safest level of function  8/23/2024 1115 by Reena Dorsey RN  Outcome: Completed     Problem: Gastrointestinal - Adult  Goal: Maintains or returns to baseline bowel function  8/23/2024 1115 by Reena Dorsey RN  Outcome: Completed     Problem: Metabolic/Fluid and Electrolytes - Adult  Goal: Electrolytes maintained within normal limits  8/23/2024 1115 by Reena Dorsey RN  Outcome: Completed     Problem: Chronic Conditions and Co-morbidities  Goal: Patient's chronic conditions and co-morbidity symptoms are monitored and maintained or improved  8/23/2024 1115 by Reena Dorsey RN  Outcome: Completed     Problem: Skin/Tissue Integrity  Goal: Absence of new skin breakdown  Description: 1.  Monitor for areas of redness and/or skin breakdown  2.  Assess vascular access sites hourly  3.  Every 4-6 hours minimum:  Change oxygen saturation probe site  4.  Every 4-6 hours:  If on nasal continuous positive airway pressure, respiratory therapy assess nares and determine need for appliance change or resting period.  8/23/2024 1115 by Reena Dorsey RN  Outcome: Completed     Problem: Safety - Adult  Goal: Free from fall injury  8/23/2024 1115 by Reena Dorsey

## 2024-08-23 NOTE — DISCHARGE SUMMARY
Hospitalist Discharge Summary    Patient: Jonathan Goldsmith  YOB: 1975  MRN: [id number]   Acct: [id number]    Primary Care Physician: Lan Davenport MD    Admit date  8/21/2024    Discharge date:      Chief Complaint on presentation: ***    Initial H&P and Hospital course:  ***    Subjective (day of discharge):   8/23: Pt resting in bed this Am. No  in room. No cuff noted on right arm. Discussed plan for discharge today with oral abx. Discussed pain medications on discharge. Emphasized no opiates are being prescribed on discharge. Lidocaine patches, voltaren gel, limited muscle relaxer to help with spasms. Discussed disc inflammation and appropriately treating pain. Discussed tylenol, ibuprofen, with steroid. Short course of gabapentin- will need to follow up OP if pain persists. Short course of Toradol has been added as well.  And of course, Oral Abx.   Patient responded well to medical management. Consultants had signed off or were contacted and agree with discharge plan. The patient was discharged in stable condition with appropriate outpatient follow up arranged.      Discharge Assessment and Plan:    ***: ***  ***: ***  ***: ***  ***: ***  ***: ***  ***: ***  ***: ***  ***: ***  ***: ***  ***: ***      Physical Exam:-  Vitals: Patient Vitals for the past 24 hrs:   BP Temp Temp src Pulse Resp SpO2 Weight   08/23/24 0933 -- -- -- -- 16 -- --   08/23/24 0854 105/60 97.9 °F (36.6 °C) Oral 58 16 99 % --   08/23/24 0603 -- -- -- -- 16 -- --   08/23/24 0535 -- -- -- -- -- -- 105.6 kg (232 lb 12.9 oz)   08/23/24 0526 136/80 97.6 °F (36.4 °C) Oral 60 16 99 % --   08/22/24 2315 119/76 98.2 °F (36.8 °C) Oral 69 18 99 % --   08/22/24 2121 -- -- -- -- 18 -- --   08/22/24 2047 127/72 98.2 °F (36.8 °C) Oral 66 18 98 % --   08/22/24 1601 121/71 -- -- 70 20 98 % --     Weight: Weight - Scale: 105.6 kg (232 lb 12.9 oz)     General appearance: No apparent distress, appears stated age and

## 2024-08-23 NOTE — ADT AUTH CERT
Utilization Reviews       Osteomyelitis Clinical Indications for Admission to Inpatient Care by Clyde Nieto RN   Last updated by Clyde Nieto RN on 8/22/2024 1330     Review Status Created By   Primary Completed Clyde Nieto RN       Review Type   Admission      Criteria Review   Osteomyelitis Clinical Indications for Admission to Inpatient Care     Overall Determination: Indications Met     Criteria:  [×] Admission is indicated for  1 or more  of the following  (1) (2) (3) (4):      [×] Severe pain requiring acute inpatient management          8/22/2024  1:30 PM              -- 8/22/2024  1:30 PM by Clyde Nieto RN --                                    (X) Severe pain requiring acute inpatient management, as indicated by  1 or more  of the following  (1) (2) (3) (4) (5) (6) (7):                  (X) Inpatient intravenous opioid treatment required, [A] as indicated by  ALL  of the following :                  (X) Pain insufficiently responsive to nonpharmacologic and nonopioid pharmacologic analgesia (eg, NSAIDs) [B] [C]                  (X) Alternative routes (eg, oral, transdermal, or submucosal) for opioid treatment not appropriate or not sufficient (10)                  (X) Intravenous opioid treatment needed beyond observation care (ie, conversion to alternative routes or medications not appropriate or not successful)          8/22/2024  1:30 PM              -- 8/22/2024  1:30 PM by Clyde Nieto RN --                  Intractable back pain secondary to concern for osteomyelitis: Reports developing intractable back pain today radiating down leg and into his testicles. PATIENT ON ANALGESIC TREATMENT WITH OXYCODONE PO PRN      [×] Vertebral osteomyelitis (12) (13) (14)          8/22/2024  1:30 PM              -- 8/22/2024  1:30 PM by Clyde Nieto RN --                  OSH CT Lumbar shows discovertebral osteomyelitis at L2-3, based on widening disc space, erosion of endplates and development of  perivertebral soft tissue thickening. Given Vancomycin, Rocephin at OSH.        08/21  by Clyde Nieto RN   Last updated by Clyde Nieto RN on 8/22/2024 1326     Review Status Created By   In Primary Clyde Nieto RN       Review Type   --      Criteria Review   DATE: 08/21  TYPE OF BED: med/surg     RELEVANT BASELINES: (lab values, vitals, o2 amount/delivery, etc.)  NO O2 HOME THERAPY DOCUMENTED   Patient was transferred from Facility [place]      PERTINENT UPDATES:  PATIENT RECEIVING IV ANTIBIOTICS  RECEIVING IV FLUIDS  PATIENT ON ANALGESIC TREATMENT WITH OXYCODONE PO PRN     VITALS:  T:  98.4 F  P: 77  R: 22  BP: 137/79  SpO2: 97% ROOM AIR  BMI: 27.10%   PAIN SCALE: 8/10        ABNL/PERTINENT LABS/RADIOLOGY/DIAGNOSTIC STUDIES:  08/21/24 06:00  CARBON DIOXIDE: 21 (L)  Calcium: 8.3 (L)  ALT: 10 (L)  Hemoglobin Quant: 12.7 (L)  Hematocrit: 39.3 (L)  MPV: 9.3 (L)  Monocytes Absolute: 0.3 (L)     EKG 12 lead  Sinus rhythm with Premature atrial complexes with Aberrant conduction  Incomplete right bundle branch block  Poor R wave progression  Borderline ECG     PHYSICAL EXAM:  Musculoskeletal: No clubbing, cyanosis or edema bilaterally. Lumbar spine tender on palpation.   Psychiatric: Anxious, erratic behavior. Alert and oriented, thought content appropriate, normal insight        MD CONSULTS/ASSESSMENT AND PLAN:  INTERNAL MEDICINE:  Assessment/Plan:     Intractable back pain secondary to concern for osteomyelitis: Reports developing intractable back pain today radiating down leg and into his testicles. Chart review notes patient was admitted last month for same, left AMA from [place] without completing ABX course. History of epidural abscess in 2017 requiring surgical I&D positive for Serratia. SIRS (1/4): . OSH CT Lumbar shows discovertebral osteomyelitis at L2-3, based on widening disc space, erosion of endplates and development of perivertebral soft tissue thickening. Given Vancomycin, Rocephin at  Route: PO X1     cyclobenzaprine (FLEXERIL) tablet 10 mg  Dose: 10 mg  Freq: 3 TIMES DAILY PRN Route: PO X1     oxyCODONE (ROXICODONE) immediate release tablet 10 mg  Dose: 10 mg  Freq: EVERY 4 HOURS PRN Route: PO X3     ORDERS:  MRI LUMBAR SPINE W WO CONTRAST  Inpatient consult to Infectious Diseases  ADULT DIET; Regular; Safety Tray; Safety Tray (Disposables)  Telemetry monitoring - 72 hour duration  Neurovascular checks  EKG 12 lead  Intake and output  CBC with Auto Differential     PT/OT/SLP/CM/RN ASSESSMENT OR NOTES:      CM NOTE:  tx [facility], went ER there after developing Intractable back pain while in court today. He was here one month ago with osteomyelitis and discharged to [place] for continued antibiotics. He left [facility] without finishing IV antibiotic

## 2024-08-23 NOTE — PROGRESS NOTES
Pharmacist Review and Automatic Dose Adjustment of Prophylactic Enoxaparin    *Review reason for admission/hospital problem list*    The reviewing pharmacist has made an adjustment to the ordered enoxaparin dose or converted to UFH per the approved [facility] protocol and table as identified below.        Jonathan Goldsmith is a 49 y.o. male.     Recent Labs     08/21/24  0600 08/22/24  1228   CREATININE 0.6 0.8       Estimated Creatinine Clearance: 146 mL/min (based on SCr of 0.8 mg/dL).    Recent Labs     08/21/24  0600 08/22/24  0633   HGB 12.7* 12.5*   HCT 39.3* 38.8*    348     No results for input(s): \"INR\" in the last 72 hours.    Height:   Ht Readings from Last 1 Encounters:   08/21/24 1.88 m (6' 2.02\")     Weight:  Wt Readings from Last 1 Encounters:   08/23/24 105.6 kg (232 lb 12.9 oz)               Plan: Based upon the patient's weight and renal function, the ordered enoxaparin dose of 40 mg daily has been changed/converted to 30 mg twice daily.      Thank you,  Yanira Elena, [facility]  8/23/2024, 5:48 AM

## 2024-08-23 NOTE — PLAN OF CARE
Problem: Discharge Planning  Goal: Discharge to home or other facility with appropriate resources  8/22/2024 2202 by Jean Marie Hung, RN  Outcome: Progressing  Flowsheets (Taken 8/22/2024 2202)  Discharge to home or other facility with appropriate resources:   Identify barriers to discharge with patient and caregiver   Arrange for needed discharge resources and transportation as appropriate   Identify discharge learning needs (meds, wound care, etc)   Refer to discharge planning if patient needs post-hospital services based on physician order or complex needs related to functional status, cognitive ability or social support system     Problem: Pain  Goal: Verbalizes/displays adequate comfort level or baseline comfort level  8/22/2024 2202 by Jean Marie Hung, RN  Outcome: Progressing  Flowsheets (Taken 8/22/2024 2202)  Verbalizes/displays adequate comfort level or baseline comfort level:   Assess pain using appropriate pain scale   Encourage patient to monitor pain and request assistance   Administer analgesics based on type and severity of pain and evaluate response   Implement non-pharmacological measures as appropriate and evaluate response     Problem: ABCDS Injury Assessment  Goal: Absence of physical injury  8/22/2024 2202 by Jean Marie Hung, RN  Outcome: Progressing  Flowsheets (Taken 8/22/2024 2202)  Absence of Physical Injury: Implement safety measures based on patient assessment     Problem: Skin/Tissue Integrity - Adult  Goal: Skin integrity remains intact  8/22/2024 2202 by Jean Marie Hung, RN  Outcome: Progressing  Flowsheets  Taken 8/22/2024 2202  Skin Integrity Remains Intact:   Monitor for areas of redness and/or skin breakdown   Assess vascular access sites hourly  Taken 8/22/2024 2200  Skin Integrity Remains Intact:   Monitor for areas of redness and/or skin breakdown   Assess vascular access sites hourly     Problem: Musculoskeletal - Adult  Goal: Return mobility to safest level of  function  8/22/2024 2202 by Jaen Marie Hung, RN  Outcome: Progressing  Flowsheets (Taken 8/22/2024 2202)  Return Mobility to Safest Level of Function:   Assess patient stability and activity tolerance for standing, transferring and ambulating with or without assistive devices   Assist with transfers and ambulation using safe patient handling equipment as needed   Ensure adequate protection for wounds/incisions during mobilization   Obtain physical therapy/occupational therapy consults as needed   Instruct patient/family in ordered activity level     Problem: Gastrointestinal - Adult  Goal: Maintains or returns to baseline bowel function  8/22/2024 2202 by Jean Marie Hung, RN  Outcome: Progressing  Flowsheets (Taken 8/22/2024 2202)  Maintains or returns to baseline bowel function:   Assess bowel function   Encourage oral fluids to ensure adequate hydration     Problem: Metabolic/Fluid and Electrolytes - Adult  Goal: Electrolytes maintained within normal limits  8/22/2024 2202 by Jean Marie Hung, RN  Outcome: Progressing  Flowsheets (Taken 8/22/2024 2202)  Electrolytes maintained within normal limits:   Monitor labs and assess patient for signs and symptoms of electrolyte imbalances   Administer electrolyte replacement as ordered   Monitor response to electrolyte replacements, including repeat lab results as appropriate     Problem: Chronic Conditions and Co-morbidities  Goal: Patient's chronic conditions and co-morbidity symptoms are monitored and maintained or improved  Outcome: Progressing  Flowsheets (Taken 8/22/2024 2202)  Care Plan - Patient's Chronic Conditions and Co-Morbidity Symptoms are Monitored and Maintained or Improved:   Monitor and assess patient's chronic conditions and comorbid symptoms for stability, deterioration, or improvement   Collaborate with multidisciplinary team to address chronic and comorbid conditions and prevent exacerbation or deterioration   Update acute care plan with appropriate goals

## 2024-08-23 NOTE — PROGRESS NOTES
Tele monitor removed prior to start of shift for MRI, Patient refusing for Tele monitor to be reapplied after returning to room.

## 2024-08-23 NOTE — TELEPHONE ENCOUNTER
Patient being discharged from [facility] today for discovertebral osteomyelitis at L2-3   Hospital follow up scheduled 8/27/24    Will need TCM 8/26/24

## 2024-08-23 NOTE — PROGRESS NOTES
Progress note: Infectious diseases    Patient - Jonathan Goldsmith,  Age - 49 y.o.    - 1975      Room Number - 7K-24/024-A   MRN -  [id number]   [facility] # - [id number]  Date of Admission -  2024  1:26 AM    SUBJECTIVE:   Patient seen and evaluated at bedside this morning. He reports ongoing back/right leg pain that was slightly improved with movement. He denies any shakes/fever/chills.   OBJECTIVE   VITALS    height is 1.88 m (6' 2.02\") and weight is 105.6 kg (232 lb 12.9 oz). His oral temperature is 97.6 °F (36.4 °C). His blood pressure is 136/80 and his pulse is 60. His respiration is 16 and oxygen saturation is 99%.       Wt Readings from Last 3 Encounters:   24 105.6 kg (232 lb 12.9 oz)   24 104.3 kg (230 lb)   24 104.3 kg (230 lb)       I/O (24 Hours)    Intake/Output Summary (Last 24 hours) at 2024 0801  Last data filed at 2024 0523  Gross per 24 hour   Intake 798.09 ml   Output 300 ml   Net 498.09 ml       General Appearance  Awake, alert, oriented,  not  In acute distress  HEENT - normocephalic, atraumatic, pink conjunctiva,  anicteric sclera  Neck - Supple, no mass  Lungs -  Bilateral good air entry, no rhonchi, no wheeze  Cardiovascular - Heart sounds are normal.  Regular rate and rhythm without murmur, gallop or rub.  Abdomen - soft, not distended, nontender,   Neurologic -Alert and oriented to person/place/time. Weakness of lower extremities R>L.   Skin - No bruising or bleeding  Extremities - No edema, no cyanosis, clubbing     MEDICATIONS:      enoxaparin  30 mg SubCUTAneous Q12H    sodium chloride flush  5-40 mL IntraVENous 2 times per day    diclofenac sodium  2 g Topical BID    dexAMETHasone  4 mg IntraVENous Q6H    cefepime  2,000 mg IntraVENous Q8H    nicotine  1 patch TransDERmal Daily      sodium chloride 25 mL/hr at 24 0579     hydrOXYzine pamoate, sodium chloride  flush, sodium chloride, potassium chloride **OR** potassium alternative oral replacement **OR** potassium chloride, magnesium sulfate, ondansetron **OR** ondansetron, polyethylene glycol, acetaminophen **OR** acetaminophen, oxyCODONE **OR** oxyCODONE, cyclobenzaprine      LABS:     CBC:   Recent Labs     08/21/24  0600 08/22/24  0633 08/23/24  0647   WBC 6.8 10.4 11.1*   HGB 12.7* 12.5* 12.6*    348 311     BMP:    Recent Labs     08/21/24  0600 08/22/24  1228 08/23/24  0647    138 139   K 4.1 4.9 4.1    104 105   CO2 21* 23 23   BUN 10 15 16   CREATININE 0.6 0.8 0.6   GLUCOSE 102 117* 109*     Calcium:  Recent Labs     08/23/24  0647   CALCIUM 8.3*     Ionized Calcium:Invalid input(s): \"IONCA\"  Magnesium:No results for input(s): \"MG\" in the last 72 hours.  Phosphorus:No results for input(s): \"PHOS\" in the last 72 hours.  BNP:No results for input(s): \"BNP\" in the last 72 hours.  Glucose:No results for input(s): \"POCGLU\" in the last 72 hours.  HgbA1C: No results for input(s): \"LABA1C\" in the last 72 hours.  INR: No results for input(s): \"INR\" in the last 72 hours.  Hepatic:   Recent Labs     08/21/24  0600   ALKPHOS 83   ALT 10*   AST 16   BILITOT 0.5     Amylase and Lipase:  Recent Labs     08/22/24  0633   LACTA 0.7     Lactic Acid:   Recent Labs     08/22/24  0633   LACTA 0.7     Troponin: No results for input(s): \"CKTOTAL\", \"CKMB\", \"TROPONINI\" in the last 72 hours.  BNP: No results for input(s): \"BNP\" in the last 72 hours.    CULTURES:   UA: No results for input(s): \"SPECGRAV\", \"PHUR\", \"COLORU\", \"CLARITYU\", \"MUCUS\", \"PROTEINU\", \"BLOODU\", \"RBCUA\", \"WBCUA\", \"BACTERIA\", \"NITRU\", \"GLUCOSEU\", \"BILIRUBINUR\", \"UROBILINOGEN\", \"KETUA\", \"LABCAST\", \"LABCASTTY\", \"AMORPHOS\" in the last 72 hours.    Invalid input(s): \"CRYSTALS\"  Micro:   Lab Results   Component Value Date/Time    BC No growth 24 hours. No growth 48 hours. 08/21/2024 06:10 AM       Problem list of patient:     Patient Active Problem List    Diagnosis Code    Epidural abscess  drained G06.2    Opiate abuse, continuous ([facility]) F11.10    Hypertension I10    Non compliance w medication regimen  left AMA yesterday from 4A Z91.148    Postoperative pain G89.18    Hyponatremia E87.1    Discitis of lumbar region M46.46    Osteomyelitis of spine (HCC)  multilevels  from t11 to l5 M46.20    Pain in left thigh M79.652    Subcutaneous nodules R22.9    Injection site reaction T80.90XA    Tobacco chew use Z72.0    Right leg numbness R20.0    Fall at home W19.XXXA, Y92.009    Microcytic anemia D50.9    IVDU (intravenous drug user) F19.90    Hepatitis C antibody test positive R76.8    Osteomyelitis (HCC) M86.9    Intractable back pain M54.9         ASSESSMENT/PLAN   -Lower back pain with Hx of discitis and osteomyelitis  -Hx of IV drug use with previous Hx of osteomyelitis of spine  -Recently treated for same ocmplaint  -MRI showed reactive disc edema at L2-3  -Repeat CRP/ESR unimpressive  -Stop IV Cefepime. Start bactrim for 1 month with f/u outpatient in 1 month.  -Okay for discharge from ID standpoint  -Patient has been noncompliant with IV treatment      Mehrdad De MD, 8/23/2024 8:01 AM     Case and plan discussed with Dr. Carroll    This report has been created using voice recognition software. It may contain minor errors which are inherent in voice recognition technology  Patient was seen and examined face-to-face by me  The chart, progress notes, labs and radiographs were reviewed.   Case discussed with the nurse. Questions and concerns were addressed.  I agree with the progress note.he will be discharged with oral bactrim  His markers of inflammation are better than before

## 2024-08-26 LAB
BACTERIA BLD AEROBE CULT: NORMAL
BACTERIA BLD AEROBE CULT: NORMAL

## 2024-08-26 NOTE — TELEPHONE ENCOUNTER
Care Transitions Initial Follow Up Call    Outreach made within 2 business days of discharge: Yes    Patient: Jonathan Goldsmith Patient : 1975   MRN: [id number]  Reason for Admission: discovertebral osteomyelitis at L2-3   Discharge Date: 24       Spoke with: GIOVANNI for patient to return call.     Scheduled appointment with PCP within 7-14 days    Follow Up  Future Appointments   Date Time Provider Department Center   2024  2:30 PM Lan Davenport MD [facility] DEP       Negra Lares MA

## 2024-08-26 NOTE — TELEPHONE ENCOUNTER
Attempted to call patient for TCM at [phone], however the woman who answered that phone stated there is no one at this number with the patient's name. Updated the chart to reflect this.

## 2024-08-29 ENCOUNTER — HOSPITAL ENCOUNTER (OUTPATIENT)
Dept: ULTRASOUND IMAGING | Age: 49
Discharge: HOME | End: 2024-08-29
Attending: RADIOLOGY

## 2024-08-29 ENCOUNTER — HOSPITAL ENCOUNTER (OUTPATIENT)
Dept: CT IMAGING | Age: 49
Discharge: HOME | End: 2024-08-29
Attending: RADIOLOGY

## 2024-08-29 DIAGNOSIS — Z00.6 ENCOUNTER FOR EXAMINATION FOR NORMAL COMPARISON OR CONTROL IN CLINICAL RESEARCH PROGRAM: ICD-10-CM

## 2024-11-24 ENCOUNTER — HOSPITAL ENCOUNTER (INPATIENT)
Age: 49
LOS: 9 days | Discharge: NURSING, REHAB OR LONG-TERM CARE | End: 2024-12-03
Attending: STUDENT IN AN ORGANIZED HEALTH CARE EDUCATION/TRAINING PROGRAM
Payer: COMMERCIAL

## 2024-11-24 ENCOUNTER — APPOINTMENT (OUTPATIENT)
Dept: CT IMAGING | Age: 49
End: 2024-11-24
Payer: COMMERCIAL

## 2024-11-24 DIAGNOSIS — M54.9 BILATERAL BACK PAIN, UNSPECIFIED BACK LOCATION, UNSPECIFIED CHRONICITY: ICD-10-CM

## 2024-11-24 DIAGNOSIS — R01.1 HEART MURMUR: ICD-10-CM

## 2024-11-24 DIAGNOSIS — M46.40 DISCITIS, UNSPECIFIED SPINAL REGION: Primary | ICD-10-CM

## 2024-11-24 PROBLEM — A41.9 SEPSIS (HCC): Status: ACTIVE | Noted: 2024-11-24

## 2024-11-24 LAB
ALBUMIN SERPL BCG-MCNC: 3.7 G/DL (ref 3.5–5.1)
ALP SERPL-CCNC: 83 U/L (ref 38–126)
ALT SERPL W/O P-5'-P-CCNC: 11 U/L (ref 11–66)
AMORPH SED URNS QL MICRO: ABNORMAL
AMPHETAMINES UR QL SCN: POSITIVE
ANION GAP SERPL CALC-SCNC: 10 MEQ/L (ref 8–16)
ANION GAP SERPL CALC-SCNC: 14 MEQ/L (ref 8–16)
AST SERPL-CCNC: 17 U/L (ref 5–40)
BACTERIA: ABNORMAL
BARBITURATES UR QL SCN: NEGATIVE
BASOPHILS ABSOLUTE: 0 THOU/MM3 (ref 0–0.1)
BASOPHILS NFR BLD AUTO: 0.1 %
BENZODIAZ UR QL SCN: NEGATIVE
BILIRUB SERPL-MCNC: 0.6 MG/DL (ref 0.3–1.2)
BILIRUB UR QL STRIP: NEGATIVE
BUN SERPL-MCNC: 8 MG/DL (ref 7–22)
BUN SERPL-MCNC: 9 MG/DL (ref 7–22)
BZE UR QL SCN: NEGATIVE
CALCIUM SERPL-MCNC: 8.7 MG/DL (ref 8.5–10.5)
CALCIUM SERPL-MCNC: 9.2 MG/DL (ref 8.5–10.5)
CANNABINOIDS UR QL SCN: NEGATIVE
CASTS #/AREA URNS LPF: ABNORMAL /LPF
CASTS #/AREA URNS LPF: ABNORMAL /LPF
CHARACTER UR: CLEAR
CHARCOAL URNS QL MICRO: ABNORMAL
CHLORIDE SERPL-SCNC: 97 MEQ/L (ref 98–111)
CHLORIDE SERPL-SCNC: 98 MEQ/L (ref 98–111)
CO2 SERPL-SCNC: 23 MEQ/L (ref 23–33)
CO2 SERPL-SCNC: 26 MEQ/L (ref 23–33)
COLOR UR: YELLOW
CREAT SERPL-MCNC: 0.7 MG/DL (ref 0.4–1.2)
CREAT SERPL-MCNC: 0.7 MG/DL (ref 0.4–1.2)
CRP SERPL-MCNC: 22.8 MG/DL (ref 0–1)
CRYSTALS URNS QL MICRO: ABNORMAL
DEPRECATED RDW RBC AUTO: 40.1 FL (ref 35–45)
EKG ATRIAL RATE: 119 BPM
EKG P AXIS: 32 DEGREES
EKG P-R INTERVAL: 188 MS
EKG Q-T INTERVAL: 316 MS
EKG QRS DURATION: 96 MS
EKG QTC CALCULATION (BAZETT): 444 MS
EKG R AXIS: 73 DEGREES
EKG T AXIS: 51 DEGREES
EKG VENTRICULAR RATE: 119 BPM
EOSINOPHIL NFR BLD AUTO: 0.2 %
EOSINOPHILS ABSOLUTE: 0 THOU/MM3 (ref 0–0.4)
EPITHELIAL CELLS, UA: ABNORMAL /HPF
ERYTHROCYTE [DISTWIDTH] IN BLOOD BY AUTOMATED COUNT: 13.4 % (ref 11.5–14.5)
ERYTHROCYTE [SEDIMENTATION RATE] IN BLOOD BY WESTERGREN METHOD: 29 MM/HR (ref 0–10)
ETHANOL SERPL-MCNC: < 0.01 % (ref 0–0.08)
FENTANYL: POSITIVE
GFR SERPL CREATININE-BSD FRML MDRD: > 90 ML/MIN/1.73M2
GFR SERPL CREATININE-BSD FRML MDRD: > 90 ML/MIN/1.73M2
GLUCOSE SERPL-MCNC: 119 MG/DL (ref 70–108)
GLUCOSE SERPL-MCNC: 129 MG/DL (ref 70–108)
GLUCOSE UR QL STRIP.AUTO: NEGATIVE MG/DL
HCT VFR BLD AUTO: 42.5 % (ref 42–52)
HGB BLD-MCNC: 13.8 GM/DL (ref 14–18)
HGB UR QL STRIP.AUTO: NEGATIVE
IMM GRANULOCYTES # BLD AUTO: 0.11 THOU/MM3 (ref 0–0.07)
IMM GRANULOCYTES NFR BLD AUTO: 0.6 %
KETONES UR QL STRIP.AUTO: NEGATIVE
LACTIC ACID, SEPSIS: 1.7 MMOL/L (ref 0.5–1.9)
LACTIC ACID, SEPSIS: 2 MMOL/L (ref 0.5–1.9)
LEUKOCYTE ESTERASE UR QL STRIP.AUTO: NEGATIVE
LYMPHOCYTES ABSOLUTE: 0.8 THOU/MM3 (ref 1–4.8)
LYMPHOCYTES NFR BLD AUTO: 4.4 %
MAGNESIUM SERPL-MCNC: 1.8 MG/DL (ref 1.6–2.4)
MCH RBC QN AUTO: 26.6 PG (ref 26–33)
MCHC RBC AUTO-ENTMCNC: 32.5 GM/DL (ref 32.2–35.5)
MCV RBC AUTO: 82 FL (ref 80–94)
MONOCYTES ABSOLUTE: 1.3 THOU/MM3 (ref 0.4–1.3)
MONOCYTES NFR BLD AUTO: 7.5 %
MUCOUS THREADS URNS QL MICRO: ABNORMAL
NEUTROPHILS ABSOLUTE: 15.3 THOU/MM3 (ref 1.8–7.7)
NEUTROPHILS NFR BLD AUTO: 87.2 %
NITRITE UR QL STRIP.AUTO: NEGATIVE
NRBC BLD AUTO-RTO: 0 /100 WBC
OPIATES UR QL SCN: NEGATIVE
OSMOLALITY SERPL CALC.SUM OF ELEC: 265.8 MOSMOL/KG (ref 275–300)
OSMOLALITY SERPL CALC.SUM OF ELEC: 270.5 MOSMOL/KG (ref 275–300)
OXYCODONE: NEGATIVE
PCP UR QL SCN: NEGATIVE
PH UR STRIP.AUTO: 6.5 [PH] (ref 5–9)
PLATELET # BLD AUTO: 298 THOU/MM3 (ref 130–400)
PMV BLD AUTO: 9.8 FL (ref 9.4–12.4)
POTASSIUM SERPL-SCNC: 4.4 MEQ/L (ref 3.5–5.2)
POTASSIUM SERPL-SCNC: 4.5 MEQ/L (ref 3.5–5.2)
PROT SERPL-MCNC: 7.7 G/DL (ref 6.1–8)
PROT UR STRIP.AUTO-MCNC: 30 MG/DL
RBC # BLD AUTO: 5.18 MILL/MM3 (ref 4.7–6.1)
RBC #/AREA URNS HPF: ABNORMAL /HPF
RENAL EPI CELLS #/AREA URNS HPF: ABNORMAL /[HPF]
SODIUM SERPL-SCNC: 133 MEQ/L (ref 135–145)
SODIUM SERPL-SCNC: 135 MEQ/L (ref 135–145)
SP GR UR REFRACT.AUTO: 1.02 (ref 1–1.03)
TROPONIN, HIGH SENSITIVITY: < 6 NG/L (ref 0–12)
UROBILINOGEN UR QL STRIP.AUTO: 1 EU/DL (ref 0–1)
WBC # BLD AUTO: 17.5 THOU/MM3 (ref 4.8–10.8)
WBC #/AREA URNS HPF: ABNORMAL /HPF
YEAST LIKE FUNGI URNS QL MICRO: ABNORMAL

## 2024-11-24 PROCEDURE — 96375 TX/PRO/DX INJ NEW DRUG ADDON: CPT

## 2024-11-24 PROCEDURE — 87801 DETECT AGNT MULT DNA AMPLI: CPT

## 2024-11-24 PROCEDURE — 80053 COMPREHEN METABOLIC PANEL: CPT

## 2024-11-24 PROCEDURE — 76376 3D RENDER W/INTRP POSTPROCES: CPT

## 2024-11-24 PROCEDURE — 99223 1ST HOSP IP/OBS HIGH 75: CPT

## 2024-11-24 PROCEDURE — 1200000000 HC SEMI PRIVATE

## 2024-11-24 PROCEDURE — 85651 RBC SED RATE NONAUTOMATED: CPT

## 2024-11-24 PROCEDURE — 93005 ELECTROCARDIOGRAM TRACING: CPT

## 2024-11-24 PROCEDURE — 93010 ELECTROCARDIOGRAM REPORT: CPT | Performed by: INTERNAL MEDICINE

## 2024-11-24 PROCEDURE — 74177 CT ABD & PELVIS W/CONTRAST: CPT

## 2024-11-24 PROCEDURE — 96361 HYDRATE IV INFUSION ADD-ON: CPT

## 2024-11-24 PROCEDURE — 85025 COMPLETE CBC W/AUTO DIFF WBC: CPT

## 2024-11-24 PROCEDURE — 1200000003 HC TELEMETRY R&B

## 2024-11-24 PROCEDURE — 6360000002 HC RX W HCPCS

## 2024-11-24 PROCEDURE — 71260 CT THORAX DX C+: CPT

## 2024-11-24 PROCEDURE — 99285 EMERGENCY DEPT VISIT HI MDM: CPT

## 2024-11-24 PROCEDURE — 86140 C-REACTIVE PROTEIN: CPT

## 2024-11-24 PROCEDURE — 81001 URINALYSIS AUTO W/SCOPE: CPT

## 2024-11-24 PROCEDURE — 96374 THER/PROPH/DIAG INJ IV PUSH: CPT

## 2024-11-24 PROCEDURE — 87040 BLOOD CULTURE FOR BACTERIA: CPT

## 2024-11-24 PROCEDURE — 83735 ASSAY OF MAGNESIUM: CPT

## 2024-11-24 PROCEDURE — 2580000003 HC RX 258

## 2024-11-24 PROCEDURE — 36415 COLL VENOUS BLD VENIPUNCTURE: CPT

## 2024-11-24 PROCEDURE — 83605 ASSAY OF LACTIC ACID: CPT

## 2024-11-24 PROCEDURE — 87077 CULTURE AEROBIC IDENTIFY: CPT

## 2024-11-24 PROCEDURE — 84484 ASSAY OF TROPONIN QUANT: CPT

## 2024-11-24 PROCEDURE — 87186 SC STD MICRODIL/AGAR DIL: CPT

## 2024-11-24 PROCEDURE — 6370000000 HC RX 637 (ALT 250 FOR IP)

## 2024-11-24 PROCEDURE — 82077 ASSAY SPEC XCP UR&BREATH IA: CPT

## 2024-11-24 PROCEDURE — 80307 DRUG TEST PRSMV CHEM ANLYZR: CPT

## 2024-11-24 PROCEDURE — 6360000004 HC RX CONTRAST MEDICATION: Performed by: STUDENT IN AN ORGANIZED HEALTH CARE EDUCATION/TRAINING PROGRAM

## 2024-11-24 RX ORDER — ONDANSETRON 2 MG/ML
4 INJECTION INTRAMUSCULAR; INTRAVENOUS ONCE
Status: COMPLETED | OUTPATIENT
Start: 2024-11-24 | End: 2024-11-24

## 2024-11-24 RX ORDER — IOPAMIDOL 755 MG/ML
80 INJECTION, SOLUTION INTRAVASCULAR
Status: COMPLETED | OUTPATIENT
Start: 2024-11-24 | End: 2024-11-24

## 2024-11-24 RX ORDER — 0.9 % SODIUM CHLORIDE 0.9 %
1000 INTRAVENOUS SOLUTION INTRAVENOUS ONCE
Status: COMPLETED | OUTPATIENT
Start: 2024-11-24 | End: 2024-11-24

## 2024-11-24 RX ORDER — SODIUM CHLORIDE 0.9 % (FLUSH) 0.9 %
5-40 SYRINGE (ML) INJECTION PRN
Status: DISCONTINUED | OUTPATIENT
Start: 2024-11-24 | End: 2024-12-03 | Stop reason: HOSPADM

## 2024-11-24 RX ORDER — FENTANYL CITRATE 50 UG/ML
25 INJECTION, SOLUTION INTRAMUSCULAR; INTRAVENOUS ONCE
Status: COMPLETED | OUTPATIENT
Start: 2024-11-24 | End: 2024-11-24

## 2024-11-24 RX ORDER — KETOROLAC TROMETHAMINE 30 MG/ML
30 INJECTION, SOLUTION INTRAMUSCULAR; INTRAVENOUS EVERY 6 HOURS PRN
Status: DISCONTINUED | OUTPATIENT
Start: 2024-11-24 | End: 2024-11-25

## 2024-11-24 RX ORDER — CYCLOBENZAPRINE HCL 10 MG
10 TABLET ORAL 3 TIMES DAILY PRN
Status: DISCONTINUED | OUTPATIENT
Start: 2024-11-24 | End: 2024-11-27 | Stop reason: SDUPTHER

## 2024-11-24 RX ORDER — FENTANYL CITRATE 50 UG/ML
50 INJECTION, SOLUTION INTRAMUSCULAR; INTRAVENOUS ONCE
Status: COMPLETED | OUTPATIENT
Start: 2024-11-24 | End: 2024-11-24

## 2024-11-24 RX ORDER — ACETAMINOPHEN 325 MG/1
650 TABLET ORAL EVERY 6 HOURS PRN
Status: DISCONTINUED | OUTPATIENT
Start: 2024-11-24 | End: 2024-11-27

## 2024-11-24 RX ORDER — ENOXAPARIN SODIUM 100 MG/ML
40 INJECTION SUBCUTANEOUS DAILY
Status: DISCONTINUED | OUTPATIENT
Start: 2024-11-25 | End: 2024-11-25

## 2024-11-24 RX ORDER — SODIUM CHLORIDE 0.9 % (FLUSH) 0.9 %
5-40 SYRINGE (ML) INJECTION EVERY 12 HOURS SCHEDULED
Status: DISCONTINUED | OUTPATIENT
Start: 2024-11-24 | End: 2024-12-03 | Stop reason: HOSPADM

## 2024-11-24 RX ORDER — SODIUM CHLORIDE 9 MG/ML
INJECTION, SOLUTION INTRAVENOUS PRN
Status: DISCONTINUED | OUTPATIENT
Start: 2024-11-24 | End: 2024-12-03 | Stop reason: HOSPADM

## 2024-11-24 RX ORDER — ACETAMINOPHEN 650 MG/1
650 SUPPOSITORY RECTAL EVERY 6 HOURS PRN
Status: DISCONTINUED | OUTPATIENT
Start: 2024-11-24 | End: 2024-11-27

## 2024-11-24 RX ADMIN — ONDANSETRON 4 MG: 2 INJECTION INTRAMUSCULAR; INTRAVENOUS at 20:30

## 2024-11-24 RX ADMIN — ACETAMINOPHEN 650 MG: 325 TABLET ORAL at 23:02

## 2024-11-24 RX ADMIN — FENTANYL CITRATE 25 MCG: 50 INJECTION, SOLUTION INTRAMUSCULAR; INTRAVENOUS at 23:21

## 2024-11-24 RX ADMIN — FENTANYL CITRATE 50 MCG: 50 INJECTION, SOLUTION INTRAMUSCULAR; INTRAVENOUS at 20:30

## 2024-11-24 RX ADMIN — IOPAMIDOL 80 ML: 755 INJECTION, SOLUTION INTRAVENOUS at 20:43

## 2024-11-24 RX ADMIN — SODIUM CHLORIDE 1000 ML: 9 INJECTION, SOLUTION INTRAVENOUS at 20:30

## 2024-11-24 RX ADMIN — CEFTRIAXONE SODIUM 2000 MG: 2 INJECTION, POWDER, FOR SOLUTION INTRAMUSCULAR; INTRAVENOUS at 22:05

## 2024-11-24 RX ADMIN — Medication 2500 MG: at 22:11

## 2024-11-24 ASSESSMENT — PAIN - FUNCTIONAL ASSESSMENT
PAIN_FUNCTIONAL_ASSESSMENT: 0-10

## 2024-11-24 ASSESSMENT — PAIN SCALES - GENERAL
PAINLEVEL_OUTOF10: 9

## 2024-11-25 ENCOUNTER — APPOINTMENT (OUTPATIENT)
Dept: MRI IMAGING | Age: 49
End: 2024-11-25
Payer: COMMERCIAL

## 2024-11-25 PROBLEM — K68.12 PSOAS ABSCESS (HCC): Status: ACTIVE | Noted: 2024-11-25

## 2024-11-25 PROBLEM — F19.10 INTRAVENOUS DRUG ABUSE (HCC): Status: ACTIVE | Noted: 2017-07-07

## 2024-11-25 PROBLEM — Z98.890 HISTORY OF LUMBAR LAMINECTOMY: Status: ACTIVE | Noted: 2024-11-25

## 2024-11-25 LAB
ACB COMPLEX DNA BLD POS QL NAA+NON-PROBE: NOT DETECTED
B FRAGILIS DNA BLD POS QL NAA+NON-PROBE: NOT DETECTED
BASOPHILS ABSOLUTE: 0 THOU/MM3 (ref 0–0.1)
BASOPHILS NFR BLD AUTO: 0.2 %
BLACTX-M ISLT/SPM QL: NOT DETECTED
BLAIMP ISLT/SPM QL: NOT DETECTED
BLAKPC ISLT/SPM QL: NOT DETECTED
BLAOXA-48-LIKE ISLT/SPM QL: NOT DETECTED
BLAVIM ISLT/SPM QL: NOT DETECTED
BOTTLE TYPE: ABNORMAL
C ALBICANS DNA BLD POS QL NAA+NON-PROBE: NOT DETECTED
C AURIS DNA BLD POS QL NAA+NON-PROBE: NOT DETECTED
C GATTII+NEOFOR DNA BLD POS QL NAA+N-PRB: NOT DETECTED
C GLABRATA DNA BLD POS QL NAA+NON-PROBE: NOT DETECTED
C KRUSEI DNA BLD POS QL NAA+NON-PROBE: NOT DETECTED
C PARAP DNA BLD POS QL NAA+NON-PROBE: NOT DETECTED
C TROPICLS DNA BLD POS QL NAA+NON-PROBE: NOT DETECTED
COAG NEG STAPH DNA BLD QL NAA+PROBE: NOT DETECTED
COLISTIN RES MCR-1 ISLT/SPM QL: ABNORMAL
DEPRECATED RDW RBC AUTO: 40.7 FL (ref 35–45)
E CLOAC COMP DNA BLD POS NAA+NON-PROBE: NOT DETECTED
E COLI DNA BLD POS QL NAA+NON-PROBE: NOT DETECTED
E FAECALIS DNA BLD POS QL NAA+NON-PROBE: NOT DETECTED
E FAECIUM DNA BLD POS QL NAA+NON-PROBE: NOT DETECTED
ENTEROBACTERALES DNA BLD POS NAA+N-PRB: DETECTED
EOSINOPHIL NFR BLD AUTO: 1.4 %
EOSINOPHILS ABSOLUTE: 0.2 THOU/MM3 (ref 0–0.4)
ERYTHROCYTE [DISTWIDTH] IN BLOOD BY AUTOMATED COUNT: 13.5 % (ref 11.5–14.5)
GP B STREP DNA SPEC QL NAA+PROBE: NOT DETECTED
GP B STREP DNA SPEC QL NAA+PROBE: NOT DETECTED
HAEM INFLU DNA BLD POS QL NAA+NON-PROBE: NOT DETECTED
HCT VFR BLD AUTO: 39.9 % (ref 42–52)
HGB BLD-MCNC: 12.6 GM/DL (ref 14–18)
IMM GRANULOCYTES # BLD AUTO: 0.06 THOU/MM3 (ref 0–0.07)
IMM GRANULOCYTES NFR BLD AUTO: 0.5 %
K OXYTOCA DNA BLD POS QL NAA+NON-PROBE: NOT DETECTED
K OXYTOCA DNA BLD POS QL NAA+NON-PROBE: NOT DETECTED
KLEBSIELLA SP DNA BLD POS QL NAA+NON-PRB: NOT DETECTED
L MONOCYTOG DNA BLD POS QL NAA+NON-PROBE: NOT DETECTED
LYMPHOCYTES ABSOLUTE: 1.2 THOU/MM3 (ref 1–4.8)
LYMPHOCYTES NFR BLD AUTO: 9.9 %
MCH RBC QN AUTO: 26.2 PG (ref 26–33)
MCHC RBC AUTO-ENTMCNC: 31.6 GM/DL (ref 32.2–35.5)
MCV RBC AUTO: 83 FL (ref 80–94)
MECA ISLT/SPM QL: ABNORMAL
MECA+MECC+MREJ ISLT/SPM QL: ABNORMAL
MONOCYTES ABSOLUTE: 1.3 THOU/MM3 (ref 0.4–1.3)
MONOCYTES NFR BLD AUTO: 10.4 %
N MEN DNA BLD POS QL NAA+NON-PROBE: NOT DETECTED
NDM: NOT DETECTED
NEUTROPHILS ABSOLUTE: 9.7 THOU/MM3 (ref 1.8–7.7)
NEUTROPHILS NFR BLD AUTO: 77.6 %
NRBC BLD AUTO-RTO: 0 /100 WBC
P AERUGINOSA DNA BLD POS NAA+NON-PROBE: NOT DETECTED
PLATELET # BLD AUTO: 210 THOU/MM3 (ref 130–400)
PMV BLD AUTO: 9.6 FL (ref 9.4–12.4)
PROTEUS SPP: NOT DETECTED
RBC # BLD AUTO: 4.81 MILL/MM3 (ref 4.7–6.1)
S AUREUS DNA BLD POS QL NAA+NON-PROBE: NOT DETECTED
S EPIDERMIDIS DNA BLD POS QL NAA+NON-PRB: NOT DETECTED
S LUGDUNENSIS DNA BLD POS QL NAA+NON-PRB: NOT DETECTED
S MALTOPHILIA DNA BLD POS QL NAA+NON-PRB: NOT DETECTED
S MARCESCENS DNA BLD POS NAA+NON-PROBE: DETECTED
S PYO DNA THROAT QL NAA+PROBE: NOT DETECTED
SALMONELLA DNA BLD POS QL NAA+NON-PROBE: NOT DETECTED
SOURCE OF BLOOD CULTURE: ABNORMAL
STREPTOCOCCUS DNA BLD QL NAA+PROBE: NOT DETECTED
VANA+VANB ISLT/SPM QL: ABNORMAL
WBC # BLD AUTO: 12.5 THOU/MM3 (ref 4.8–10.8)

## 2024-11-25 PROCEDURE — A9579 GAD-BASE MR CONTRAST NOS,1ML: HCPCS | Performed by: INTERNAL MEDICINE

## 2024-11-25 PROCEDURE — 2580000003 HC RX 258

## 2024-11-25 PROCEDURE — 1200000003 HC TELEMETRY R&B

## 2024-11-25 PROCEDURE — 6360000002 HC RX W HCPCS

## 2024-11-25 PROCEDURE — 36415 COLL VENOUS BLD VENIPUNCTURE: CPT

## 2024-11-25 PROCEDURE — 6360000004 HC RX CONTRAST MEDICATION: Performed by: INTERNAL MEDICINE

## 2024-11-25 PROCEDURE — 6360000002 HC RX W HCPCS: Performed by: INTERNAL MEDICINE

## 2024-11-25 PROCEDURE — 85025 COMPLETE CBC W/AUTO DIFF WBC: CPT

## 2024-11-25 PROCEDURE — 99223 1ST HOSP IP/OBS HIGH 75: CPT | Performed by: STUDENT IN AN ORGANIZED HEALTH CARE EDUCATION/TRAINING PROGRAM

## 2024-11-25 PROCEDURE — 72158 MRI LUMBAR SPINE W/O & W/DYE: CPT

## 2024-11-25 PROCEDURE — 1200000000 HC SEMI PRIVATE

## 2024-11-25 PROCEDURE — 6370000000 HC RX 637 (ALT 250 FOR IP)

## 2024-11-25 PROCEDURE — 99233 SBSQ HOSP IP/OBS HIGH 50: CPT | Performed by: INTERNAL MEDICINE

## 2024-11-25 RX ORDER — MORPHINE SULFATE 2 MG/ML
2 INJECTION, SOLUTION INTRAMUSCULAR; INTRAVENOUS
Status: DISCONTINUED | OUTPATIENT
Start: 2024-11-25 | End: 2024-11-26

## 2024-11-25 RX ORDER — ENOXAPARIN SODIUM 100 MG/ML
30 INJECTION SUBCUTANEOUS 2 TIMES DAILY
Status: DISCONTINUED | OUTPATIENT
Start: 2024-11-25 | End: 2024-12-03 | Stop reason: HOSPADM

## 2024-11-25 RX ORDER — GABAPENTIN 300 MG/1
300 CAPSULE ORAL 2 TIMES DAILY
Status: DISCONTINUED | OUTPATIENT
Start: 2024-11-25 | End: 2024-11-25

## 2024-11-25 RX ADMIN — MORPHINE SULFATE 2 MG: 2 INJECTION, SOLUTION INTRAMUSCULAR; INTRAVENOUS at 19:52

## 2024-11-25 RX ADMIN — ENOXAPARIN SODIUM 30 MG: 100 INJECTION SUBCUTANEOUS at 19:56

## 2024-11-25 RX ADMIN — CYCLOBENZAPRINE 10 MG: 10 TABLET, FILM COATED ORAL at 15:35

## 2024-11-25 RX ADMIN — KETOROLAC TROMETHAMINE 30 MG: 30 INJECTION, SOLUTION INTRAMUSCULAR at 15:33

## 2024-11-25 RX ADMIN — VANCOMYCIN HYDROCHLORIDE 1250 MG: 5 INJECTION, POWDER, LYOPHILIZED, FOR SOLUTION INTRAVENOUS at 07:39

## 2024-11-25 RX ADMIN — CEFEPIME 2000 MG: 2 INJECTION, POWDER, FOR SOLUTION INTRAVENOUS at 15:36

## 2024-11-25 RX ADMIN — CYCLOBENZAPRINE 10 MG: 10 TABLET, FILM COATED ORAL at 23:48

## 2024-11-25 RX ADMIN — KETOROLAC TROMETHAMINE 30 MG: 30 INJECTION, SOLUTION INTRAMUSCULAR at 08:12

## 2024-11-25 RX ADMIN — WATER 2000 MG: 1 INJECTION INTRAMUSCULAR; INTRAVENOUS; SUBCUTANEOUS at 10:08

## 2024-11-25 RX ADMIN — MORPHINE SULFATE 2 MG: 2 INJECTION, SOLUTION INTRAMUSCULAR; INTRAVENOUS at 21:51

## 2024-11-25 RX ADMIN — ACETAMINOPHEN 650 MG: 325 TABLET ORAL at 21:44

## 2024-11-25 RX ADMIN — MORPHINE SULFATE 2 MG: 2 INJECTION, SOLUTION INTRAMUSCULAR; INTRAVENOUS at 16:04

## 2024-11-25 RX ADMIN — VANCOMYCIN HYDROCHLORIDE 1250 MG: 5 INJECTION, POWDER, LYOPHILIZED, FOR SOLUTION INTRAVENOUS at 14:08

## 2024-11-25 RX ADMIN — KETOROLAC TROMETHAMINE 30 MG: 30 INJECTION, SOLUTION INTRAMUSCULAR at 01:00

## 2024-11-25 RX ADMIN — CYCLOBENZAPRINE 10 MG: 10 TABLET, FILM COATED ORAL at 08:12

## 2024-11-25 RX ADMIN — CYCLOBENZAPRINE 10 MG: 10 TABLET, FILM COATED ORAL at 15:33

## 2024-11-25 RX ADMIN — CYCLOBENZAPRINE 10 MG: 10 TABLET, FILM COATED ORAL at 01:00

## 2024-11-25 RX ADMIN — GADOTERIDOL 20 ML: 279.3 INJECTION, SOLUTION INTRAVENOUS at 13:41

## 2024-11-25 RX ADMIN — ENOXAPARIN SODIUM 30 MG: 100 INJECTION SUBCUTANEOUS at 08:12

## 2024-11-25 RX ADMIN — SODIUM CHLORIDE, PRESERVATIVE FREE 10 ML: 5 INJECTION INTRAVENOUS at 20:02

## 2024-11-25 RX ADMIN — VANCOMYCIN HYDROCHLORIDE 1250 MG: 5 INJECTION, POWDER, LYOPHILIZED, FOR SOLUTION INTRAVENOUS at 21:56

## 2024-11-25 RX ADMIN — MORPHINE SULFATE 2 MG: 2 INJECTION, SOLUTION INTRAMUSCULAR; INTRAVENOUS at 23:51

## 2024-11-25 ASSESSMENT — PAIN SCALES - GENERAL
PAINLEVEL_OUTOF10: 10
PAINLEVEL_OUTOF10: 10
PAINLEVEL_OUTOF10: 9
PAINLEVEL_OUTOF10: 8
PAINLEVEL_OUTOF10: 8
PAINLEVEL_OUTOF10: 10
PAINLEVEL_OUTOF10: 10
PAINLEVEL_OUTOF10: 9
PAINLEVEL_OUTOF10: 4
PAINLEVEL_OUTOF10: 10

## 2024-11-25 ASSESSMENT — PAIN DESCRIPTION - DIRECTION: RADIATING_TOWARDS: LEG

## 2024-11-25 ASSESSMENT — PAIN DESCRIPTION - DESCRIPTORS
DESCRIPTORS: BURNING
DESCRIPTORS: BURNING
DESCRIPTORS: ACHING
DESCRIPTORS: BURNING
DESCRIPTORS: ACHING

## 2024-11-25 ASSESSMENT — PAIN - FUNCTIONAL ASSESSMENT
PAIN_FUNCTIONAL_ASSESSMENT: ACTIVITIES ARE NOT PREVENTED
PAIN_FUNCTIONAL_ASSESSMENT: PREVENTS OR INTERFERES SOME ACTIVE ACTIVITIES AND ADLS

## 2024-11-25 ASSESSMENT — PAIN DESCRIPTION - FREQUENCY
FREQUENCY: CONTINUOUS
FREQUENCY: CONTINUOUS

## 2024-11-25 ASSESSMENT — PAIN SCALES - WONG BAKER
WONGBAKER_NUMERICALRESPONSE: NO HURT
WONGBAKER_NUMERICALRESPONSE: HURTS WHOLE LOT
WONGBAKER_NUMERICALRESPONSE: NO HURT

## 2024-11-25 ASSESSMENT — PAIN DESCRIPTION - ORIENTATION
ORIENTATION: LEFT
ORIENTATION: RIGHT
ORIENTATION: MID
ORIENTATION: LEFT

## 2024-11-25 ASSESSMENT — PAIN DESCRIPTION - PAIN TYPE
TYPE: ACUTE PAIN;CHRONIC PAIN
TYPE: ACUTE PAIN

## 2024-11-25 ASSESSMENT — PAIN DESCRIPTION - LOCATION
LOCATION: BACK
LOCATION: BACK;HEAD
LOCATION: HEAD

## 2024-11-25 NOTE — ED NOTES
ED to inpatient nurses report      Chief Complaint:  Chief Complaint   Patient presents with    Back Pain     Present to ED from: home    MOA:     LOC: alert and orientated to name, place, date  Mobility: Requires assistance * 1  Oxygen Baseline: ra    Current needs required: ra     Code Status:   Full Code    What abnormal results were found and what did you give/do to treat them? Discitis   Any procedures or intervention occur? See mar    Mental Status:  Level of Consciousness: Alert (0)    Psych Assessment:        Vitals:  Patient Vitals for the past 24 hrs:   BP Temp Temp src Pulse Resp SpO2 Weight   11/24/24 2211 129/73 -- -- (!) 117 13 97 % --   11/24/24 2130 -- -- -- -- -- -- 100 kg (220 lb 7.4 oz)   11/24/24 2030 -- -- -- -- 11 -- --   11/24/24 2029 (!) 152/93 -- -- (!) 113 11 98 % --   11/24/24 1958 -- 99.9 °F (37.7 °C) Axillary -- -- -- --   11/24/24 1920 (!) 196/182 -- -- (!) 115 13 98 % --        LDAs:   Peripheral IV 11/24/24 Right Forearm (Active)   Site Assessment Clean, dry & intact 11/24/24 2025   Phlebitis Assessment No symptoms 11/24/24 2025   Infiltration Assessment 0 11/24/24 2025       Ambulatory Status:  No data recorded    Diagnosis:  DISPOSITION Admitted 11/24/2024 10:38:47 PM   Final diagnoses:   Discitis, unspecified spinal region        Consults:  PHARMACY TO DOSE VANCOMYCIN     Pain Score:  Pain Assessment  Pain Assessment: 0-10  Pain Level: 9    C-SSRS:   Risk of Suicide: No Risk    Sepsis Screening:       [place] Fall Risk:       Swallow Screening        Preferred Language:   English      ALLERGIES     Morphine    SURGICAL HISTORY       Past Surgical History:   Procedure Laterality Date    BACK SURGERY  02/17/2017    L5-S1 bilateral repeat laminectomies with resection of epidural granulation tissue and bilateral L5-S1 microdiscectomy  on 2/7/17    BACK SURGERY  06/30/2017    Lumbar laminectomy, L3-S1 decompression, PSF, lumbar incision and drainage--Dr Barroso    CHOLECYSTECTOMY

## 2024-11-25 NOTE — FLOWSHEET NOTE
11/25/24 0758   Treatment Team Notification   Reason for Communication Patient/Family request   Name of Team Member Notified [facility]   Treatment Team Role Attending Provider   Method of Communication Secure Message   Response See orders   Notification Time 1220     Patient just arrived to the floor being admitted for sepsis. Patient is currently crying because he is NPO and he states he is severely dehydrated and hungry.     He can eat and drink

## 2024-11-25 NOTE — PROGRESS NOTES
[facility]   Pharmacy Pharmacokinetic Monitoring Service - Vancomycin     Jonathan Goldsmith is a 49 y.o. male starting on vancomycin therapy for   Suspected Sepsis of CNS Origin - Patient Age Under 50. Pharmacy consulted by Chloé Nicholson CNP for monitoring and adjustment.    Target Concentration: Goal AUC/-600 mg*hr/L    Additional Antimicrobials: ceftriaxone    Pertinent Laboratory Values:   Wt Readings from Last 1 Encounters:   11/24/24 100 kg (220 lb 7.4 oz)     Temp Readings from Last 1 Encounters:   11/24/24 99.9 °F (37.7 °C) (Axillary)     Estimated Creatinine Clearance: 163 mL/min (based on SCr of 0.7 mg/dL).  Recent Labs     11/24/24 1955   CREATININE 0.7   BUN 9   WBC 17.5*     Pertinent Cultures:  Date Source Results   11/24/24 Blood x 2    MRSA Nasal Swab: N/A. Non-respiratory infection.    Plan:  Dosing recommendations based on Bayesian software  Start vancomycin 2500mg x 1 (2211) then 1250mg q8h  Anticipated AUC of 554 and trough concentration of 15.3 at steady state  Renal labs as indicated   Vancomycin concentration ordered for 11/26 @ 0530 (trough)   Pharmacy will continue to monitor patient and adjust therapy as indicated    Thank you for the consult,  Char Sweeney RP, BCPS, BCGP  11/24/2024     10:29 PM

## 2024-11-25 NOTE — ED PROVIDER NOTES
[facility] EMERGENCY DEPARTMENT - VISIT NOTE    Patient Name: Jonathan Goldsmith  MRN: [id number]  YOB: 1975  Date of Evaluation: 11/24/2024  Treating Resident Physician: Reece Smith MD  Supervising Physician: Juan Antonio Moreira DO    CHIEF COMPLAINT       Chief Complaint   Patient presents with    Back Pain       HISTORY OF PRESENT ILLNESS    HPI    History obtained from chart review and the patient.    Jonathan is a 49 y.o. old male who presents to the emergency department by Wheelchair for evaluation of back pain.  Patient has a history of IV substance abuse suspected to be because of thoracolumbar osteomyelitis.  Patient left from [facility] prior to completing full antibiotic course for discitis/osteomyelitis.  Patient reports that today he presented again because \" the infection has back\".  He describes very similar back pain and diffuse weakness, describes laying on the floor in significant pain.  Denies saddle anesthesia, bowel or bladder incontinence, new traumatic injury.    Chart reviewed, relevant history summarized in HPI above.      REVIEW OF SYSTEMS   Review of Systems  Negative unless documented in HPI    PAST MEDICAL AND SURGICAL HISTORY   Jonathan  has a past medical history of Anemia, Discitis of lumbar region (6/27/2017), Epidural abscess, IVDU (intravenous drug user), and Osteomyelitis of spine (HCC)  multilevels  from t11 to l5 (6/27/2017).    Jonathan  has a past surgical history that includes Cholecystectomy; lumbar laminectomy (12/30/2016); back surgery (02/17/2017); and back surgery (06/30/2017).    CURRENT MEDICATIONS   Jonathan has a current medication list which includes the following long-term medication(s): ketorolac, gabapentin, and diclofenac sodium.    ALLERGIES   Jonathan is allergic to morphine.    FAMILY HISTORY   Jonathan family history includes High Blood Pressure in his father and mother.    SOCIAL HISTORY   Jonathan  reports that he has quit smoking. His smoking use included  in time range)   ondansetron (ZOFRAN) injection 4 mg (4 mg IntraVENous Given 11/24/24 2030)   fentaNYL (SUBLIMAZE) injection 50 mcg (50 mcg IntraVENous Given 11/24/24 2030)   iopamidol (ISOVUE-370) 76 % injection 80 mL (80 mLs IntraVENous Given 11/24/24 2043)       ED Course as of 11/24/24 2211   Sun Nov 24, 2024 2017 WBC(!): 17.5  Concern for infection [EM]   2028 WBC(!): 17.5 [JW]   2028 Lactic Acid, Sepsis(!): 2.0 [JW]   2119 CT CHEST W CONTRAST  No acute finding [EM]   2134 CT ABDOMEN PELVIS W IV CONTRAST Additional Contrast? None  Findings of chronic discitis [EM]   2139 Initiated 2 g Rocephin, vancomycin per pharmacy recommendations [JW]   2152 No uti [EM]   2156 Amphetamine+Methamphetamine Urine Screen: POSITIVE [EM]      ED Course User Index  [EM] Juan Antonio Moreira, DO  [JW] Reece Smith MD       Procedures: (None if left blank)  Procedures:     Consultants:  None    Documentation:  N/A and Sepsis Identification & Treatment  Vital Signs: BP: (!) 152/93  Pulse: (!) 113  Respirations: 11  Temp: 99.9 °F (37.7 °C) SpO2: 98 %  SIRS  (positive if >2)    Temp > 38.3C or < 36C    HR > 90    RR > 20    WBC > 12 or < 4 or >10% bands    Sepsis: if SIRS (>2) and confirmed or suspected source of infection                Is Sepsis due to likely bacterial infection?:Yes                Sepsis Identified at Time: 2001  Lactic Acid (If >2.0 MUST be repeated within 6 hours)  Antibiotics must be Given within 3 hours of sepsis identification    IM Abx acceptable if documented inability to obtain IV access  Blood Cultures prior to Antibiotics              If Serum Lactic Acid is elevated, is it likely from infection or another etiology? Only 1.7    MEDICATION CHANGES     New Prescriptions    No medications on file       FINAL IMPRESSION     Final diagnoses:   Discitis, unspecified spinal region       DISPOSITION   DISPOSITION             Results and plan discussed with patient at bedside. Patient is agreeable to  plan.         This transcription was electronically signed. Parts of this transcriptions may have been dictated by use of voice recognition software and electronically transcribed. The transcription may contain errors not detected in proofreading. Please refer to my supervising physician's documentation if my documentation differs.    Electronically Signed: Reece Smith MD, 11/24/24, 9:54 PM

## 2024-11-25 NOTE — CARE COORDINATION
Case Management Assessment Initial Evaluation    Date/Time of Evaluation: 2024 11:49 AM  Assessment Completed by: Ksenia Da Silva RN    If patient is discharged prior to next notation, then this note serves as note for discharge by case management.    Patient Name: Jonathan Goldsmith                   YOB: 1975  Diagnosis: Sepsis (HCC) [A41.9]  Discitis, unspecified spinal region [M46.40]                   Date / Time: 2024  7:11 PM  Location: [address] [place] [address]     Patient Admission Status: Inpatient   Readmission Risk Low 0-14, Mod 15-19), High > 20: Readmission Risk Score: 8.7    Current PCP: Lan Davenport MD  Health Care Decision Makers:   Primary Decision Maker: Lidia Goldsmith - Brother/Sister - [phone]    Additional Case Management Notes: Patient presented to the ED with back pain, fever, impaired mobility and fall. Known osteomyelitis with IV antibiotics, left [facility] AMA. Known hx of drug use. + UDS for Fentanyl and Methamphetamine. WBC 17.5, 12.5. Blood cultures sent. IV Rocephin and IV Vanc. PRN IV Toradol. MRI ordered.     Procedures: n/a    Imagin/24 CT Lumbar:  1. Chronic L2-L3 spondylodiscitis as described above. No associated paravertebral soft tissue swelling or fluid collection.   2. Severe bilateral L2-L3 neural foramina stenosis, increased since 24.    CT A/P:  1. Chronic L2-L3 spondylodiscitis. No paravertebral soft tissue swelling or fluid collection L2-L3 to suggest acute component.  2. No acute intra-abdominal abnormality.  3. Mild splenomegaly.   MRI Lumbar Spine ordered.     Patient Goals/Plan/Treatment Preferences: Jonathan is from home with a friend, who drives him. He would of course like to return home at discharge but is open to LTACH if abx are needed. Following ID plan.     Please note, Jonathan cannot discharge home with a line because of drug use immediately prior to admission. He is not a candidate for SNF placement with a line for the same. BALJINDER

## 2024-11-25 NOTE — ED NOTES
Pt to the ED via personal  transport. Pt presents with complaints of uncontrolled back pain. Patient states that symptoms began worsening over the last several days. Pt recently left AMA from [facility] while being treated for this condition. EKG completed and IV access attempted. X2 attempts unsuccessful. Another RN to attempt. Telemetry applied. Patient is alert and oriented x 4. Respirations are regular and unlabored. Patient provided blanket.  Call light within reach.

## 2024-11-25 NOTE — ED NOTES
Patient resting in bed. Respirations easy and unlabored. No distress noted. Call light within reach. States pain is a 9/10.

## 2024-11-25 NOTE — PROGRESS NOTES
Hospitalist Progress Note    Patient:  Jonathan Goldsmith    Unit/Bed:8B-32/032-A  YOB: 1975  MRN: [id number]   Acct: [id number]   PCP: Lan Davenport MD  Code Status: Full Code    Date of Service: Pt seen/examined on 11/25/24 and admitted to Inpatient with expected LOS greater than two midnights due to medical therapy.     Chief Complaint: Back pain    Assessment/Plan:    Sepsis, concerns for osteomyelitis: SIRS 2/4 (tachycardia and leukocytosis) Hx of IV drug abuse.  Hx of epidural abscess in 2017. Osteomyelitis L2-L3 8/2024.  CRP 22.80. WBC 17.5. Initial lactic 2.0 down to 1.7. UA (-).  CT chest (-) acute process. CT lumbar spine shows chronic L2-L3 spondylodiscitis and severe bilateral L2-L3 neural foramina stenosis that has increased since 7/27/24.  CT thoracic spine unremarkable and CT abdomen and pelvis shows mild splenomegaly.   Blood cultures X 2 pending.   Continue Rocephin and Vancomycin.   MRI of lumbar spine-pending results, may require orthopedic consultation.   Consider ID consult.   Consult orthopedics.   Pain control-IV Toradol and Flexeril for the back spasms.     Leukocytosis: Secondary to #1. WBC 17.5 on admission.   Antibiotics as above.   Daily CBC    Primary HTN: Elevated.  Likely secondary to pain.  Not on antihypertensives.  Pain control.  If adequately controled and remains hypertensive, will need to add oral antihypertensive agent.     Polysubstance abuse:   UDS positive for methamphetamines and Fentanyl (though obtained after given Fentanyl in ED)     Tobacco abuse:   Encourage smoking cessation.     11.25.2024 patient seen this a.m. Long discussion about IV drug use with the use of methamphetamine.  Patient states that he has gone through addiction medicine but stopped going after a few months.  Patient states he used methamphetamine 5 times a week and he knows he needs to quit but states he cannot.  We discussed options for pain control, long-term antibiotics, and  11/24/2024  CT thoracic spine without contrast. Indication: Back pain. History of spinal osteomyelitis. Technique: CT thoracic spine without intravenous contrast. Coronal and sagittal reformations were performed. Comparison: None Findings: The thoracic vertebral bodies are intact without acute fracture. Chronic moderate T7 anterior wedging fracture. Chronic mild T11 anterior wedging deformity. Multilevel disc space narrowing and endplate osteophytosis. Multilevel Schmorls nodes. T5 hemangioma. No high-grade central canal stenosis. Mainly mild neural foramina stenosis T8-T9 down to T10-T11 due to facet arthropathy. No paravertebral soft tissue swelling. Partially imaged lungs: No focal consolidation or pleural effusion. Old granulomatous disease.     Impression: 1. No acute thoracic spinal abnormality. 2. Thoracic spondylosis. This document has been electronically signed by: Brenden Shi MD on 11/24/2024 09:39 PM All CTs at this facility use dose modulation techniques and iterative reconstructions, and/or weight-based dosing when appropriate to reduce radiation to a low as reasonably achievable.    CT ABDOMEN PELVIS W IV CONTRAST Additional Contrast? None    Result Date: 11/24/2024  CT abdomen and pelvis with contrast Indication: Back pain. History of spinal osteomyelitis. Technique: CT abdomen and pelvis with intravenous contrast. Coronal and sagittal reformations. Comparison: MRI lumbar spine 08/22/2024. CT lumbar spine 07/22/2024. CT abdomen/pelvis 9/27/2007. Findings: Partially imaged lung bases: No pleural effusion or focal consolidation. Calcified lung granulomata. Abdomen/pelvis: Calcified hepatic and splenic granulomata. Enlarged spleen up to 15.2 cm. Liver and spleen are otherwise normal. Prior cholecystectomy, new since 2007. Pancreas and adrenal glands are normal. The kidneys enhance symmetrically. No hydronephrosis bilaterally. The large and small bowel is nondilated. Cecum located in the midline,  normal.     Impression: No acute thoracic abnormality. This document has been electronically signed by: Brenden Shi MD on 11/24/2024 09:14 PM All CTs at this facility use dose modulation techniques and iterative reconstructions, and/or weight-based dosing when appropriate to reduce radiation to a low as reasonably achievable.      EKG: Sinus tachycardia    Thank you Lan Davenport MD for the opportunity to be involved in this patient's care.    Electronically signed by Carmelo Mckinney DO on 11/25/2024 at 8:38 AM

## 2024-11-25 NOTE — CONSULTS
Hx and P/E :Infectious D.       Patient - Jonathan Goldsmith,  Age - 49 y.o.    - 1975      Room Number - 8B-32/032-A   N -  [id number]   [facility] # - [id number]  Date of Admission -  2024  7:11 PM  Patient's PCP: Lan Davenport MD     Requesting Physician: Carmelo Mckinney DO    CHIEF COMPLAINT:   Low back pain      Problem list of patient     Patient Active Problem List   Diagnosis    Epidural abscess  drained    Opiate abuse, continuous (HCC)    Hypertension    Non compliance w medication regimen  left AMA yesterday from [facility]A    Postoperative pain    Hyponatremia    Discitis of lumbar region    Osteomyelitis of spine (HCC)  multilevels  from t11 to l5    Pain in left thigh    Subcutaneous nodules    Injection site reaction    Tobacco chew use    Right leg numbness    Fall at home    Microcytic anemia    IVDU (intravenous drug user)    Hepatitis C antibody test positive    Osteomyelitis    Intractable back pain    Sepsis (HCC)           ASSESSMENT AND PLAN   Suspected lumbar osteomyelitis: in the setting of likely incomplete treatment discitis and osteomyelitis,previously noncompliant with IV treatment. Most recently discharged with bactrim x1 month 2024. He did not follow-up with Dr. Carroll as OP. Question whether or not he completed course of bactrim as he was unaware of duration. Given elevated CRP higher than previous, ESR, and leukocytosis suspect infection is present. F/u on previously ordered lumbar spine MRI. Depending on MRI results will need bone biopsy vs surgical intervention. Continue IV vancomycin empirically, switch ceftriaxone to cefepime for pseudomonal coverage empirically. Will likely need extended course of IV abx, if patient amenable.  Pain control per primary. F/u blood culturesx2.         HISTORY OF PRESENT ILLNESS       This is a 49 y.o. male with PMH of lumbar laminectomy of L5-S1 in 2016 lumbar decompression and I&D in 2017. More recently he has

## 2024-11-25 NOTE — H&P
Hospitalist History & Physical    Patient:  Jonathan Goldsmith    Unit/Bed:10/010A  YOB: 1975  MRN: [id number]   Acct: [id number]   PCP: Lan Davenport MD  Code Status: Prior    Date of Service: Pt seen/examined on 11/24/24 and admitted to Inpatient with expected LOS greater than two midnights due to medical therapy.     Chief Complaint: Back pain    Assessment/Plan:    Sepsis, concerns for osteomyelitis: SIRS 2/4 (tachycardia and leukocytosis) Hx of IV drug abuse.  Hx of epidural abscess in 2017. Osteomyelitis L2-L3 8/2024.  CRP 22.80. WBC 17.5. Initial lactic 2.0 down to 1.7. UA (-).  CT chest (-) acute process. CT lumbar spine shows chronic L2-L3 spondylodiscitis and severe bilateral L2-L3 neural foramina stenosis that has increased since 7/27/24.  CT thoracic spine unremarkable and CT abdomen and pelvis shows mild splenomegaly.   Blood cultures X 2 pending.   Continue Rocephin and Vancomycin.   MRI of lumbar spine-pending results, may require orthopedic consultation.   Consider ID consult.   Consult orthopedics.   Pain control-IV Toradol and Flexeril for the back spasms.     Leukocytosis: Secondary to #1. WBC 17.5 on admission.   Antibiotics as above.   Daily CBC    Primary HTN: Elevated.  Likely secondary to pain.  Not on antihypertensives.  Pain control.  If adequately controled and remains hypertensive, will need to add oral antihypertensive agent.     Polysubstance abuse:   UDS positive for methamphetamines and Fentanyl (though obtained after given Fentanyl in ED)     Tobacco abuse:   Encourage smoking cessation.       LDA: []CVC / []PICC / []Midline / []Marquez / []Drains / []Mediport / [x]None  Antibiotics: Rocephin Vancomycin  Steroids: no  Labs: [x]Yes / []No  IVF: []Yes / []No    Level of care: []Step Down / [x]Med-Surg  Bed Status: [x]Inpatient / []Observation  Telemetry: [x]Yes / []No  PT/OT: [x]Yes / []No    DVT Prophylaxis: [x] Lovenox / [] Heparin / [] SCDs / [] Already on  Normocephalic and atraumatic.      Mouth/Throat:      Mouth: Mucous membranes are moist.      Pharynx: Oropharynx is clear.   Eyes:      Extraocular Movements: Extraocular movements intact.      Pupils: Pupils are equal, round, and reactive to light.   Cardiovascular:      Rate and Rhythm: Regular rhythm. Tachycardia present.   Pulmonary:      Effort: Pulmonary effort is normal.      Breath sounds: Normal breath sounds.   Abdominal:      General: Bowel sounds are normal.      Palpations: Abdomen is soft.   Musculoskeletal:         General: Tenderness (lumbar region of his back) present.      Right lower leg: No edema.      Left lower leg: No edema.   Skin:     General: Skin is warm and dry.      Capillary Refill: Capillary refill takes less than 2 seconds.   Neurological:      General: No focal deficit present.      Mental Status: He is alert.      Sensory: Sensory deficit (burning sensation of left leg) present.          Data: (All radiographs, tracings, PFTs, and imaging are personally viewed and interpreted unless otherwise noted)  Labs:   Recent Labs     11/24/24 1955   WBC 17.5*   HGB 13.8*   HCT 42.5        Recent Labs     11/24/24 1955      K 4.5   CL 98   CO2 23   BUN 9   CREATININE 0.7   CALCIUM 9.2     Recent Labs     11/24/24 1955   AST 17   ALT 11   BILITOT 0.6   ALKPHOS 83     No results for input(s): \"INR\" in the last 72 hours.  No results for input(s): \"CKTOTAL\", \"TROPONINI\" in the last 72 hours.  Urinalysis:   Lab Results   Component Value Date/Time    NITRU NEGATIVE 11/24/2024 09:33 PM    WBCUA 2-4 11/24/2024 09:33 PM    WBCUA 0-5 05/30/2017 06:45 PM    BACTERIA NONE SEEN 11/24/2024 09:33 PM    RBCUA 0-2 11/24/2024 09:33 PM    BLOODU NEGATIVE 11/24/2024 09:33 PM    GLUCOSEU NEGATIVE 11/24/2024 09:33 PM       Radiology:  CT ABDOMEN PELVIS W IV CONTRAST Additional Contrast? None   Final Result   Impression:   1. Chronic L2-L3 spondylodiscitis. No paravertebral soft tissue swelling   is partially distended with a normal contour. Mildly enlarged prostate Fat containing umbilical and left inguinal hernias. Bones: Spondylosis posterior fusion L3-S1 along with multilevel posterior decompression. Chronic inferior L2 and superior L3 endplate irregularities with sclerosis and osteophytosis mainly new since 07/27/2024 and increased since 08/22/2024. No paravertebral soft tissue swelling or fluid collection L2-L3.     Impression: 1. Chronic L2-L3 spondylodiscitis. No paravertebral soft tissue swelling or fluid collection L2-L3 to suggest acute component. 2. No acute intra-abdominal abnormality. 3. Mild splenomegaly. This document has been electronically signed by: Brenden Shi MD on 11/24/2024 09:32 PM All CTs at this facility use dose modulation techniques and iterative reconstructions, and/or weight-based dosing when appropriate to reduce radiation to a low as reasonably achievable.    CT CHEST W CONTRAST    Result Date: 11/24/2024  CT Chest with contrast Indication: Back pain. History of spinal osteomyelitis. Technique: CT chest with intravenous contrast. Coronal and sagittal reformations. Comparison: None Findings: Minor atelectasis. No pleural effusion, or focal consolidation. Old granulomatous disease with calcified mediastinal and hilar lymph nodes and calcified lung granulomata. No bulky mediastinal, hilar, or axillary lymphadenopathy. The heart is normal in size. No moderate or large pericardial effusion. The thoracic aorta is normal in caliber. Partially imaged upper abdomen: No upper abdominal ascites. Calcified hepatic and splenic granulomata. Prior cholecystectomy. Bones: No suspicious osseous lesions. Spondylosis. T5 hemangioma. Chronic T7 anterior wedging deformity. Paravertebral soft tissue are normal.     Impression: No acute thoracic abnormality. This document has been electronically signed by: Brenden Shi MD on 11/24/2024 09:14 PM All CTs at this facility use dose modulation techniques  coffee

## 2024-11-25 NOTE — PROGRESS NOTES
Pharmacist Review and Automatic Dose Adjustment of Prophylactic Enoxaparin or Heparin    Reviewed reason for admission/hospital problem list    The reviewing pharmacist has made an adjustment to the ordered enoxaparin or heparin dose or converted to heparin per the approved [facility] protocol and table as identified below.      Recent Labs     11/24/24 1955 11/24/24 2229   CREATININE 0.7 0.7     Estimated Creatinine Clearance: 173 mL/min (based on SCr of 0.7 mg/dL).    Recent Labs     11/24/24 1955   HGB 13.8*   HCT 42.5        No results for input(s): \"INR\" in the last 72 hours.    Height:   Ht Readings from Last 1 Encounters:   11/25/24 1.88 m (6' 2\")     Weight:  Wt Readings from Last 1 Encounters:   11/25/24 113.4 kg (250 lb)       *Do not exceed enoxaparin 40mg daily or UFH 5000 units SUBQ TID in patients with epidurals,  lumbar drains, or external ventricular drains.    Plan:   Changed enoxaparin 40 mg subq daily to enoxaparin 30 mg subq BID.     Thank you,  Paulette Coffman [facility] 11/25/2024 12:22 AM

## 2024-11-26 ENCOUNTER — ANESTHESIA EVENT (OUTPATIENT)
Dept: OPERATING ROOM | Age: 49
End: 2024-11-26
Payer: COMMERCIAL

## 2024-11-26 ENCOUNTER — APPOINTMENT (OUTPATIENT)
Age: 49
End: 2024-11-26
Attending: INTERNAL MEDICINE
Payer: COMMERCIAL

## 2024-11-26 LAB
ECHO AV CUSP MM: 2.3 CM
ECHO AV PEAK GRADIENT: 12 MMHG
ECHO AV PEAK VELOCITY: 1.7 M/S
ECHO AV VELOCITY RATIO: 0.71
ECHO BSA: 2.43 M2
ECHO EST RA PRESSURE: 5 MMHG
ECHO LA AREA 2C: 15.3 CM2
ECHO LA AREA 4C: 16.5 CM2
ECHO LA DIAMETER INDEX: 1.67 CM/M2
ECHO LA DIAMETER: 4 CM
ECHO LA MAJOR AXIS: 5.3 CM
ECHO LA MINOR AXIS: 5.5 CM
ECHO LA VOL BP: 39 ML (ref 18–58)
ECHO LA VOL MOD A2C: 35 ML (ref 18–58)
ECHO LA VOL MOD A4C: 42 ML (ref 18–58)
ECHO LA VOL/BSA BIPLANE: 16 ML/M2 (ref 16–34)
ECHO LA VOLUME INDEX MOD A2C: 15 ML/M2 (ref 16–34)
ECHO LA VOLUME INDEX MOD A4C: 18 ML/M2 (ref 16–34)
ECHO LV E' LATERAL VELOCITY: 12.1 CM/S
ECHO LV E' SEPTAL VELOCITY: 9.4 CM/S
ECHO LV EJECTION FRACTION BIPLANE: 50 % (ref 55–100)
ECHO LV FRACTIONAL SHORTENING: 26 % (ref 28–44)
ECHO LV INTERNAL DIMENSION DIASTOLE INDEX: 2.08 CM/M2
ECHO LV INTERNAL DIMENSION DIASTOLIC: 5 CM (ref 4.2–5.9)
ECHO LV INTERNAL DIMENSION SYSTOLIC INDEX: 1.54 CM/M2
ECHO LV INTERNAL DIMENSION SYSTOLIC: 3.7 CM
ECHO LV ISOVOLUMETRIC RELAXATION TIME (IVRT): 77 MS
ECHO LV IVSD: 1 CM (ref 0.6–1)
ECHO LV MASS 2D: 169.9 G (ref 88–224)
ECHO LV MASS INDEX 2D: 70.8 G/M2 (ref 49–115)
ECHO LV POSTERIOR WALL DIASTOLIC: 0.9 CM (ref 0.6–1)
ECHO LV RELATIVE WALL THICKNESS RATIO: 0.36
ECHO LVOT PEAK GRADIENT: 6 MMHG
ECHO LVOT PEAK VELOCITY: 1.2 M/S
ECHO MV A VELOCITY: 0.84 M/S
ECHO MV E DECELERATION TIME (DT): 249 MS
ECHO MV E VELOCITY: 0.87 M/S
ECHO MV E/A RATIO: 1.04
ECHO MV E/E' LATERAL: 7.19
ECHO MV E/E' RATIO (AVERAGED): 8.22
ECHO MV E/E' SEPTAL: 9.26
ECHO PULMONARY ARTERY END DIASTOLIC PRESSURE: 11 MMHG
ECHO PV MAX VELOCITY: 1 M/S
ECHO PV PEAK GRADIENT: 4 MMHG
ECHO PV REGURGITANT MAX VELOCITY: 1.6 M/S
ECHO RV INTERNAL DIMENSION: 3 CM
ECHO RV TAPSE: 2.2 CM (ref 1.7–?)
ECHO TV E WAVE: 0.8 M/S
VANCOMYCIN SERPL-MCNC: 12.3 UG/ML (ref 0.1–39.9)

## 2024-11-26 PROCEDURE — 36415 COLL VENOUS BLD VENIPUNCTURE: CPT

## 2024-11-26 PROCEDURE — 99223 1ST HOSP IP/OBS HIGH 75: CPT | Performed by: NEUROLOGICAL SURGERY

## 2024-11-26 PROCEDURE — 80202 ASSAY OF VANCOMYCIN: CPT

## 2024-11-26 PROCEDURE — 2580000003 HC RX 258

## 2024-11-26 PROCEDURE — 1200000000 HC SEMI PRIVATE

## 2024-11-26 PROCEDURE — 6360000002 HC RX W HCPCS

## 2024-11-26 PROCEDURE — 93306 TTE W/DOPPLER COMPLETE: CPT | Performed by: NUCLEAR MEDICINE

## 2024-11-26 PROCEDURE — 6360000002 HC RX W HCPCS: Performed by: INTERNAL MEDICINE

## 2024-11-26 PROCEDURE — 93306 TTE W/DOPPLER COMPLETE: CPT

## 2024-11-26 PROCEDURE — 6370000000 HC RX 637 (ALT 250 FOR IP)

## 2024-11-26 PROCEDURE — 99232 SBSQ HOSP IP/OBS MODERATE 35: CPT | Performed by: STUDENT IN AN ORGANIZED HEALTH CARE EDUCATION/TRAINING PROGRAM

## 2024-11-26 PROCEDURE — 99233 SBSQ HOSP IP/OBS HIGH 50: CPT | Performed by: INTERNAL MEDICINE

## 2024-11-26 RX ADMIN — ACETAMINOPHEN 650 MG: 325 TABLET ORAL at 17:06

## 2024-11-26 RX ADMIN — HYDROMORPHONE HYDROCHLORIDE 0.5 MG: 1 INJECTION, SOLUTION INTRAMUSCULAR; INTRAVENOUS; SUBCUTANEOUS at 09:30

## 2024-11-26 RX ADMIN — Medication 1750 MG: at 14:52

## 2024-11-26 RX ADMIN — HYDROMORPHONE HYDROCHLORIDE 0.5 MG: 1 INJECTION, SOLUTION INTRAMUSCULAR; INTRAVENOUS; SUBCUTANEOUS at 15:36

## 2024-11-26 RX ADMIN — HYDROMORPHONE HYDROCHLORIDE 1 MG: 1 INJECTION, SOLUTION INTRAMUSCULAR; INTRAVENOUS; SUBCUTANEOUS at 22:59

## 2024-11-26 RX ADMIN — CEFEPIME 2000 MG: 2 INJECTION, POWDER, FOR SOLUTION INTRAVENOUS at 08:45

## 2024-11-26 RX ADMIN — MORPHINE SULFATE 2 MG: 2 INJECTION, SOLUTION INTRAMUSCULAR; INTRAVENOUS at 02:08

## 2024-11-26 RX ADMIN — SODIUM CHLORIDE, PRESERVATIVE FREE 10 ML: 5 INJECTION INTRAVENOUS at 19:50

## 2024-11-26 RX ADMIN — ENOXAPARIN SODIUM 30 MG: 100 INJECTION SUBCUTANEOUS at 19:50

## 2024-11-26 RX ADMIN — VANCOMYCIN HYDROCHLORIDE 1250 MG: 5 INJECTION, POWDER, LYOPHILIZED, FOR SOLUTION INTRAVENOUS at 06:31

## 2024-11-26 RX ADMIN — CEFEPIME 2000 MG: 2 INJECTION, POWDER, FOR SOLUTION INTRAVENOUS at 17:06

## 2024-11-26 RX ADMIN — HYDROMORPHONE HYDROCHLORIDE 0.5 MG: 1 INJECTION, SOLUTION INTRAMUSCULAR; INTRAVENOUS; SUBCUTANEOUS at 06:26

## 2024-11-26 RX ADMIN — CEFEPIME 2000 MG: 2 INJECTION, POWDER, FOR SOLUTION INTRAVENOUS at 01:35

## 2024-11-26 RX ADMIN — HYDROMORPHONE HYDROCHLORIDE 0.5 MG: 1 INJECTION, SOLUTION INTRAMUSCULAR; INTRAVENOUS; SUBCUTANEOUS at 12:28

## 2024-11-26 RX ADMIN — HYDROMORPHONE HYDROCHLORIDE 0.5 MG: 1 INJECTION, SOLUTION INTRAMUSCULAR; INTRAVENOUS; SUBCUTANEOUS at 03:01

## 2024-11-26 RX ADMIN — Medication 1750 MG: at 22:31

## 2024-11-26 RX ADMIN — ENOXAPARIN SODIUM 30 MG: 100 INJECTION SUBCUTANEOUS at 08:44

## 2024-11-26 RX ADMIN — HYDROMORPHONE HYDROCHLORIDE 1 MG: 1 INJECTION, SOLUTION INTRAMUSCULAR; INTRAVENOUS; SUBCUTANEOUS at 19:48

## 2024-11-26 ASSESSMENT — PAIN DESCRIPTION - LOCATION
LOCATION: BACK

## 2024-11-26 ASSESSMENT — PAIN DESCRIPTION - ORIENTATION
ORIENTATION: MID;LOWER
ORIENTATION: MID
ORIENTATION: MID
ORIENTATION: RIGHT
ORIENTATION: MID;LOWER
ORIENTATION: MID
ORIENTATION: RIGHT

## 2024-11-26 ASSESSMENT — PAIN - FUNCTIONAL ASSESSMENT
PAIN_FUNCTIONAL_ASSESSMENT: PREVENTS OR INTERFERES SOME ACTIVE ACTIVITIES AND ADLS
PAIN_FUNCTIONAL_ASSESSMENT: ACTIVITIES ARE NOT PREVENTED
PAIN_FUNCTIONAL_ASSESSMENT: PREVENTS OR INTERFERES SOME ACTIVE ACTIVITIES AND ADLS
PAIN_FUNCTIONAL_ASSESSMENT: PREVENTS OR INTERFERES SOME ACTIVE ACTIVITIES AND ADLS
PAIN_FUNCTIONAL_ASSESSMENT: ACTIVITIES ARE NOT PREVENTED

## 2024-11-26 ASSESSMENT — PAIN SCALES - GENERAL
PAINLEVEL_OUTOF10: 8
PAINLEVEL_OUTOF10: 10
PAINLEVEL_OUTOF10: 0
PAINLEVEL_OUTOF10: 10
PAINLEVEL_OUTOF10: 10
PAINLEVEL_OUTOF10: 0
PAINLEVEL_OUTOF10: 8
PAINLEVEL_OUTOF10: 10

## 2024-11-26 ASSESSMENT — PAIN DESCRIPTION - DESCRIPTORS
DESCRIPTORS: BURNING
DESCRIPTORS: BURNING;ACHING
DESCRIPTORS: DISCOMFORT
DESCRIPTORS: BURNING
DESCRIPTORS: DISCOMFORT
DESCRIPTORS: BURNING

## 2024-11-26 ASSESSMENT — PAIN DESCRIPTION - PAIN TYPE
TYPE: ACUTE PAIN;CHRONIC PAIN

## 2024-11-26 ASSESSMENT — PAIN DESCRIPTION - FREQUENCY
FREQUENCY: CONTINUOUS

## 2024-11-26 ASSESSMENT — PAIN DESCRIPTION - DIRECTION
RADIATING_TOWARDS: LEGS
RADIATING_TOWARDS: LEG
RADIATING_TOWARDS: LEG

## 2024-11-26 NOTE — PROGRESS NOTES
Pt. Refusing to wear bedside telemetry monitor. Pt. States \"its just too much\". Pt. Educated on importance and reasoning for monitor. Provider aware

## 2024-11-26 NOTE — CARE COORDINATION
11/26/24, 2:42 PM EST    DISCHARGE ON GOING EVALUATION    [facility]       [facility] day: 2  Location: [facility]/032-A Reason for admit: Sepsis (HCC) [A41.9]  Discitis, unspecified spinal region [M46.40]     Procedures: No.    Imaging since last note: 11-25-24 MRI LS  IMPRESSION:  1. Interval development of a ventral epidural abscess measuring 1.5 cm posterior  to the disc at the L2-3 level which is causing worsening spinal canal stenosis.  This is now moderate-severe.  2. Interval development of an abscess within the left psoas muscle.  3. Worsening discitis/osteomyelitis at the L2-3 level.  4. Diffuse edema and abnormal signal in both psoas muscles consistent with  myositis.    Barriers to Discharge: WBC's down a bit to 12.5. IV Cefepime and Vanc. Dilaudid for pain. ID following. Ortho consulted. Echo ordered. Await final cultures. May need LTACH.     PCP: Lan Davenport MD  Readmission Risk Score: 10    Patient Goals/Plan/Treatment Preferences:  Jonathan is from home with a friend, who drives him. He would of course like to return home at discharge but is open to LTACH if abx are needed. Following ID plan.

## 2024-11-26 NOTE — PROGRESS NOTES
Ortho Spine  Late entry  Patient seen this am and imaging reviewed.  Patient likely needs a L2-3 decompression/abscess evacuation and fusion extension.   He complains of back and leg pain with numbness. Denies bowel or bladder incontinence. Last BM yesterday.   He is able to move his bilateral LE, but complains of pain with strength testing.   Dr. Gould discussed case with Dr. Doherty who is on spine call, he will take over for spine and take to OR this week.     EJ Mccracken

## 2024-11-26 NOTE — PROGRESS NOTES
Hospitalist Progress Note    Patient:  Jonathan Goldsmith    Unit/Bed:8B-32/032-A  YOB: 1975  MRN: [id number]   Acct: [id number]   PCP: Lan Davenport MD  Code Status: Full Code    Date of Service: Pt seen/examined on 11/26/24 and admitted to Inpatient with expected LOS greater than two midnights due to medical therapy.     Chief Complaint: Back pain    Assessment/Plan:    Sepsis, epidural abscess, likely discitis: SIRS 2/4 (tachycardia and leukocytosis) Hx of IV drug abuse.  Hx of epidural abscess in 2017. Osteomyelitis L2-L3 8/2024.  CRP 22.80. WBC 17.5. Initial lactic 2.0 down to 1.7. UA (-).  CT chest (-) acute process. CT lumbar spine shows chronic L2-L3 spondylodiscitis and severe bilateral L2-L3 neural foramina stenosis that has increased since 7/27/24.  CT thoracic spine unremarkable and CT abdomen and pelvis shows mild splenomegaly.   Blood cultures X 2 showing Serratia on molecular ID.  Continue cefepime and vancomycin  ID Consult  Consult spine surgery Discussed Psoas possible abscess,. Dr. Doherty explained that reactive fluid in the psoas muscle can be common and that direct intervention can complicate outcomes rather than improve them. He added that thee fluid collections often resolve post resolution of the epidural abscess and discitis. Will continue to monitor post op. Plan for OR for spine tomorrow.   Pain control- Dilaudid    Leukocytosis: Secondary to #1. WBC 17.5 on admission.   Antibiotics as above.   Daily CBC, improving.     Secondary HTN: Elevated.  Likely secondary to pain.  Not on antihypertensives.  Pain control.  If adequately controled and remains hypertensive, will need to add oral antihypertensive agent.     Polysubstance abuse:   UDS positive for methamphetamines and Fentanyl (though obtained after given Fentanyl in ED). Endorses IV amphetamine use.     Tobacco abuse:   Encourage smoking cessation.     11.26.2024 patient seen this a.m. Pain continues to be an  General: He is in acute distress.      Appearance: He is ill-appearing.   HENT:      Head: Normocephalic and atraumatic.      Mouth/Throat:      Mouth: Mucous membranes are moist.      Pharynx: Oropharynx is clear.   Eyes:      Extraocular Movements: Extraocular movements intact.      Pupils: Pupils are equal, round, and reactive to light.   Cardiovascular:      Rate and Rhythm: Regular rhythm. Tachycardia present.   Pulmonary:      Effort: Pulmonary effort is normal.      Breath sounds: Normal breath sounds.   Abdominal:      General: Bowel sounds are normal.      Palpations: Abdomen is soft.   Musculoskeletal:         General: Tenderness (lumbar region of his back) present.      Right lower leg: No edema.      Left lower leg: No edema.   Skin:     General: Skin is warm and dry.      Capillary Refill: Capillary refill takes less than 2 seconds.   Neurological:      General: No focal deficit present.      Mental Status: He is alert.      Sensory: Sensory deficit (burning sensation of left leg) present.          Data: (All radiographs, tracings, PFTs, and imaging are personally viewed and interpreted unless otherwise noted)  Labs:   Recent Labs     11/24/24 1955 11/25/24  0745   WBC 17.5* 12.5*   HGB 13.8* 12.6*   HCT 42.5 39.9*    210     Recent Labs     11/24/24 1955 11/24/24  2229    133*   K 4.5 4.4   CL 98 97*   CO2 23 26   BUN 9 8   CREATININE 0.7 0.7   CALCIUM 9.2 8.7     Recent Labs     11/24/24 1955   AST 17   ALT 11   BILITOT 0.6   ALKPHOS 83     No results for input(s): \"INR\" in the last 72 hours.  No results for input(s): \"CKTOTAL\", \"TROPONINI\" in the last 72 hours.  Urinalysis:   Lab Results   Component Value Date/Time    NITRU NEGATIVE 11/24/2024 09:33 PM    WBCUA 2-4 11/24/2024 09:33 PM    WBCUA 0-5 05/30/2017 06:45 PM    BACTERIA NONE SEEN 11/24/2024 09:33 PM    RBCUA 0-2 11/24/2024 09:33 PM    BLOODU NEGATIVE 11/24/2024 09:33 PM    GLUCOSEU NEGATIVE 11/24/2024 09:33 PM  None    Result Date: 11/24/2024  CT abdomen and pelvis with contrast Indication: Back pain. History of spinal osteomyelitis. Technique: CT abdomen and pelvis with intravenous contrast. Coronal and sagittal reformations. Comparison: MRI lumbar spine 08/22/2024. CT lumbar spine 07/22/2024. CT abdomen/pelvis 9/27/2007. Findings: Partially imaged lung bases: No pleural effusion or focal consolidation. Calcified lung granulomata. Abdomen/pelvis: Calcified hepatic and splenic granulomata. Enlarged spleen up to 15.2 cm. Liver and spleen are otherwise normal. Prior cholecystectomy, new since 2007. Pancreas and adrenal glands are normal. The kidneys enhance symmetrically. No hydronephrosis bilaterally. The large and small bowel is nondilated. Cecum located in the midline, suggesting mobile cecum. Normal appendix. Moderate amount of stool in the colon No bulky abdominal or retroperitoneal lymphadenopathy. The abdominal aorta is normal in caliber. Urinary bladder is partially distended with a normal contour. Mildly enlarged prostate Fat containing umbilical and left inguinal hernias. Bones: Spondylosis posterior fusion L3-S1 along with multilevel posterior decompression. Chronic inferior L2 and superior L3 endplate irregularities with sclerosis and osteophytosis mainly new since 07/27/2024 and increased since 08/22/2024. No paravertebral soft tissue swelling or fluid collection L2-L3.     Impression: 1. Chronic L2-L3 spondylodiscitis. No paravertebral soft tissue swelling or fluid collection L2-L3 to suggest acute component. 2. No acute intra-abdominal abnormality. 3. Mild splenomegaly. This document has been electronically signed by: Brenden Shi MD on 11/24/2024 09:32 PM All CTs at this facility use dose modulation techniques and iterative reconstructions, and/or weight-based dosing when appropriate to reduce radiation to a low as reasonably achievable.    CT CHEST W CONTRAST    Result Date: 11/24/2024  CT Chest with contrast

## 2024-11-26 NOTE — PLAN OF CARE
Problem: Pain  Goal: Verbalizes/displays adequate comfort level or baseline comfort level  Outcome: Not Progressing     Problem: Discharge Planning  Goal: Discharge to home or other facility with appropriate resources  Outcome: Progressing     Problem: Safety - Adult  Goal: Free from fall injury  Outcome: Progressing     Problem: ABCDS Injury Assessment  Goal: Absence of physical injury  Outcome: Progressing     Problem: Risk for Elopement  Goal: Patient will not exit the unit/facility without proper excort  Outcome: Progressing     Problem: Pain  Goal: Verbalizes/displays adequate comfort level or baseline comfort level  Outcome: Not Progressing

## 2024-11-26 NOTE — PROGRESS NOTES
[facility]   Pharmacy Pharmacokinetic Monitoring Service - Vancomycin    Indication: Sepsis, suspected CNS origin  Target Concentration: Goal AUC/-600 mg*hr/L  Day of Therapy: 3  Additional Antimicrobials: Cefepime    Pertinent Laboratory Values:   Wt Readings from Last 1 Encounters:   11/26/24 115.1 kg (253 lb 12 oz)     Temp Readings from Last 1 Encounters:   11/26/24 97.9 °F (36.6 °C) (Oral)     Estimated Creatinine Clearance: 174 mL/min (based on SCr of 0.7 mg/dL).  Recent Labs     11/24/24  1955 11/24/24  2229 11/25/24  0745   CREATININE 0.7 0.7  --    BUN 9 8  --    WBC 17.5*  --  12.5*     Pertinent Cultures:  Date Source Results   11/24/24 BC x2  GNB   11/24/24 BCID Enterobacterales, Serratia   MRSA Nasal Swab: N/A. Non-respiratory infection.    Recent vancomycin administrations                     vancomycin (VANCOCIN) 1,250 mg in dextrose 5 % 250 mL IVPB (mg) 1,250 mg New Bag 11/26/24 0631      Restarted  0005     1,250 mg New Bag 11/25/24 2156      Restarted  1629     1,250 mg New Bag  1408     1,250 mg New Bag  0739    vancomycin (VANCOCIN) 2500 mg in sodium chloride 0.9 % 500 mL IVPB ()  Restarted 11/24/24 2224     2,500 mg New Bag  2211                    Assessment:  Date/Time Current Dose Concentration Timing of Concentration (hr) AUC C trough,ss   11/26/24 1250 mg Q8H 12.3 ~4 h 371 9.3   Note: Serum concentrations collected for AUC dosing may appear elevated if collected in close proximity to the dose administered, this is not necessarily an indication of toxicity    Plan:  Current dosing regimen is sub-therapeutic  Increase dose to 1750 mg Q8H for estimated AUC of  516 & trough of 12.6  Repeat vancomycin concentration ordered for 11/27 @ 0500   Pharmacy will monitor renal function daily and adjust therapy as indicated.    Thank you for the consult,  Precious Mireles, PharmD  11/26/2024 at 11:45 AM
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
## 2024-11-26 NOTE — PROGRESS NOTES
[facility] Infectious Disease         Progress Note      Jonathan Goldsmith  MEDICAL RECORD NUMBER:  [id number]  AGE: 49 y.o.   GENDER: male  : 1975  EPISODE DATE:  2024      Assessment:     Patient is a 49-year-old male who I am consulted for epidural abscess.  Blood cultures positive for Serratia.  I would recommend repeat blood cultures on .  Currently on therapy with vancomycin and cefepime.     Ventral epidural abscess: 1.5 cm at the L2-3 level.     Left psoas abscess: 2.7x1.6 cm. Also enhancement of the right psoas without asbscess.     Discitis/osteomyelitis at L2-3 level: surrounding L3 pedicle screws and left pedicle screw at L4.     Gram negative bacteremia: molecular ID showing serratia marcescens. Blood cultures x 2 growing gm neg bacilli.      Will continue vancomycin and cefepime for staph and strep in the setting of IVDU as well as cefepime for pseudomonas and gm negative coverage given serratia species showing on blood molecular ID.   Spine surgery has been consulted for consideration of surgical intervention.   Duration of therapy and de-escalation to be decided       Subjective:     Chief Complaint   Patient presents with    Back Pain       Patient was seen at the bedside. Fevered to 100.5F overnight. He is having a lot of low back pain.         Patient Active Problem List   Diagnosis Code    Epidural abscess  drained G06.2    Opiate abuse, continuous ([facility]) F11.10    Hypertension I10    Non compliance w medication regimen  left AMA yesterday from 4A Z91.148    Postoperative pain G89.18    Hyponatremia E87.1    Discitis M46.40    Osteomyelitis of spine ([facility])  multilevels  from t11 to l5 M46.20    Pain in left thigh M79.652    Subcutaneous nodules R22.9    Injection site reaction T80.90XA    Tobacco chew use Z72.0    Right leg numbness R20.0    Fall at home W19.XXXA, Y92.009    Microcytic anemia D50.9    Intravenous drug abuse ([facility]) F19.10    Hepatitis C antibody test

## 2024-11-27 ENCOUNTER — APPOINTMENT (OUTPATIENT)
Dept: GENERAL RADIOLOGY | Age: 49
End: 2024-11-27
Payer: COMMERCIAL

## 2024-11-27 ENCOUNTER — ANESTHESIA (OUTPATIENT)
Dept: OPERATING ROOM | Age: 49
End: 2024-11-27
Payer: COMMERCIAL

## 2024-11-27 PROBLEM — R78.81 GRAM-NEGATIVE BACTEREMIA: Status: ACTIVE | Noted: 2024-11-27

## 2024-11-27 LAB
ABO: NORMAL
ANION GAP SERPL CALC-SCNC: 12 MEQ/L (ref 8–16)
ANTIBODY SCREEN: NORMAL
BACTERIA BLD AEROBE CULT: ABNORMAL
BASOPHILS ABSOLUTE: 0 THOU/MM3 (ref 0–0.1)
BASOPHILS NFR BLD AUTO: 0.3 %
BUN SERPL-MCNC: 8 MG/DL (ref 7–22)
CALCIUM SERPL-MCNC: 8.5 MG/DL (ref 8.5–10.5)
CHLORIDE SERPL-SCNC: 99 MEQ/L (ref 98–111)
CK SERPL-CCNC: 372 U/L (ref 55–170)
CO2 SERPL-SCNC: 24 MEQ/L (ref 23–33)
CREAT SERPL-MCNC: 0.6 MG/DL (ref 0.4–1.2)
CRP SERPL-MCNC: 22.79 MG/DL (ref 0–1)
DEPRECATED RDW RBC AUTO: 41.6 FL (ref 35–45)
EOSINOPHIL NFR BLD AUTO: 1.9 %
EOSINOPHILS ABSOLUTE: 0.2 THOU/MM3 (ref 0–0.4)
ERYTHROCYTE [DISTWIDTH] IN BLOOD BY AUTOMATED COUNT: 13.5 % (ref 11.5–14.5)
GFR SERPL CREATININE-BSD FRML MDRD: > 90 ML/MIN/1.73M2
GLUCOSE SERPL-MCNC: 104 MG/DL (ref 70–108)
HCT VFR BLD AUTO: 35.4 % (ref 42–52)
HCT VFR BLD AUTO: 38 % (ref 42–52)
HGB BLD-MCNC: 11.3 GM/DL (ref 14–18)
HGB BLD-MCNC: 11.8 GM/DL (ref 14–18)
IMM GRANULOCYTES # BLD AUTO: 0.05 THOU/MM3 (ref 0–0.07)
IMM GRANULOCYTES NFR BLD AUTO: 0.5 %
LYMPHOCYTES ABSOLUTE: 1.3 THOU/MM3 (ref 1–4.8)
LYMPHOCYTES NFR BLD AUTO: 12.1 %
MCH RBC QN AUTO: 26.1 PG (ref 26–33)
MCHC RBC AUTO-ENTMCNC: 31.1 GM/DL (ref 32.2–35.5)
MCV RBC AUTO: 84.1 FL (ref 80–94)
MONOCYTES ABSOLUTE: 1.1 THOU/MM3 (ref 0.4–1.3)
MONOCYTES NFR BLD AUTO: 10.3 %
NEUTROPHILS ABSOLUTE: 8.2 THOU/MM3 (ref 1.8–7.7)
NEUTROPHILS NFR BLD AUTO: 74.9 %
NRBC BLD AUTO-RTO: 0 /100 WBC
ORGANISM: ABNORMAL
ORGANISM: ABNORMAL
PLATELET # BLD AUTO: 247 THOU/MM3 (ref 130–400)
PMV BLD AUTO: 10 FL (ref 9.4–12.4)
POTASSIUM SERPL-SCNC: 3.8 MEQ/L (ref 3.5–5.2)
RBC # BLD AUTO: 4.52 MILL/MM3 (ref 4.7–6.1)
RH FACTOR: NORMAL
SODIUM SERPL-SCNC: 135 MEQ/L (ref 135–145)
VANCOMYCIN SERPL-MCNC: 11.9 UG/ML (ref 0.1–39.9)
WBC # BLD AUTO: 11 THOU/MM3 (ref 4.8–10.8)

## 2024-11-27 PROCEDURE — 2709999900 HC NON-CHARGEABLE SUPPLY: Performed by: NEUROLOGICAL SURGERY

## 2024-11-27 PROCEDURE — P9045 ALBUMIN (HUMAN), 5%, 250 ML: HCPCS | Performed by: NURSE ANESTHETIST, CERTIFIED REGISTERED

## 2024-11-27 PROCEDURE — 85025 COMPLETE CBC W/AUTO DIFF WBC: CPT

## 2024-11-27 PROCEDURE — 87186 SC STD MICRODIL/AGAR DIL: CPT

## 2024-11-27 PROCEDURE — 61783 SCAN PROC SPINAL: CPT | Performed by: NEUROLOGICAL SURGERY

## 2024-11-27 PROCEDURE — 6360000002 HC RX W HCPCS: Performed by: PHYSICIAN ASSISTANT

## 2024-11-27 PROCEDURE — 3700000001 HC ADD 15 MINUTES (ANESTHESIA): Performed by: NEUROLOGICAL SURGERY

## 2024-11-27 PROCEDURE — 2580000003 HC RX 258: Performed by: NURSE ANESTHETIST, CERTIFIED REGISTERED

## 2024-11-27 PROCEDURE — 6360000002 HC RX W HCPCS: Performed by: NURSE ANESTHETIST, CERTIFIED REGISTERED

## 2024-11-27 PROCEDURE — 2580000003 HC RX 258

## 2024-11-27 PROCEDURE — 80048 BASIC METABOLIC PNL TOTAL CA: CPT

## 2024-11-27 PROCEDURE — 0QH004Z INSERTION OF INTERNAL FIXATION DEVICE INTO LUMBAR VERTEBRA, OPEN APPROACH: ICD-10-PCS | Performed by: NEUROLOGICAL SURGERY

## 2024-11-27 PROCEDURE — 6370000000 HC RX 637 (ALT 250 FOR IP)

## 2024-11-27 PROCEDURE — 87641 MR-STAPH DNA AMP PROBE: CPT

## 2024-11-27 PROCEDURE — 86900 BLOOD TYPING SEROLOGIC ABO: CPT

## 2024-11-27 PROCEDURE — 99232 SBSQ HOSP IP/OBS MODERATE 35: CPT | Performed by: STUDENT IN AN ORGANIZED HEALTH CARE EDUCATION/TRAINING PROGRAM

## 2024-11-27 PROCEDURE — 2000000000 HC ICU R&B

## 2024-11-27 PROCEDURE — 6360000002 HC RX W HCPCS

## 2024-11-27 PROCEDURE — 80202 ASSAY OF VANCOMYCIN: CPT

## 2024-11-27 PROCEDURE — 7100000001 HC PACU RECOVERY - ADDTL 15 MIN: Performed by: NEUROLOGICAL SURGERY

## 2024-11-27 PROCEDURE — 2500000003 HC RX 250 WO HCPCS: Performed by: NURSE ANESTHETIST, CERTIFIED REGISTERED

## 2024-11-27 PROCEDURE — 0QH104Z INSERTION OF INTERNAL FIXATION DEVICE INTO SACRUM, OPEN APPROACH: ICD-10-PCS | Performed by: NEUROLOGICAL SURGERY

## 2024-11-27 PROCEDURE — 6360000002 HC RX W HCPCS: Performed by: INTERNAL MEDICINE

## 2024-11-27 PROCEDURE — 87075 CULTR BACTERIA EXCEPT BLOOD: CPT

## 2024-11-27 PROCEDURE — 0QP104Z REMOVAL OF INTERNAL FIXATION DEVICE FROM SACRUM, OPEN APPROACH: ICD-10-PCS | Performed by: NEUROLOGICAL SURGERY

## 2024-11-27 PROCEDURE — P9016 RBC LEUKOCYTES REDUCED: HCPCS

## 2024-11-27 PROCEDURE — 2500000003 HC RX 250 WO HCPCS

## 2024-11-27 PROCEDURE — 86140 C-REACTIVE PROTEIN: CPT

## 2024-11-27 PROCEDURE — 0QP004Z REMOVAL OF INTERNAL FIXATION DEVICE FROM LUMBAR VERTEBRA, OPEN APPROACH: ICD-10-PCS | Performed by: NEUROLOGICAL SURGERY

## 2024-11-27 PROCEDURE — C1713 ANCHOR/SCREW BN/BN,TIS/BN: HCPCS | Performed by: NEUROLOGICAL SURGERY

## 2024-11-27 PROCEDURE — 99232 SBSQ HOSP IP/OBS MODERATE 35: CPT

## 2024-11-27 PROCEDURE — 3600000014 HC SURGERY LEVEL 4 ADDTL 15MIN: Performed by: NEUROLOGICAL SURGERY

## 2024-11-27 PROCEDURE — 86923 COMPATIBILITY TEST ELECTRIC: CPT

## 2024-11-27 PROCEDURE — 63267 EXCISE INTRSPINL LESION LMBR: CPT | Performed by: PHYSICIAN ASSISTANT

## 2024-11-27 PROCEDURE — 6360000002 HC RX W HCPCS: Performed by: STUDENT IN AN ORGANIZED HEALTH CARE EDUCATION/TRAINING PROGRAM

## 2024-11-27 PROCEDURE — 7100000000 HC PACU RECOVERY - FIRST 15 MIN: Performed by: NEUROLOGICAL SURGERY

## 2024-11-27 PROCEDURE — 87070 CULTURE OTHR SPECIMN AEROBIC: CPT

## 2024-11-27 PROCEDURE — 30233N1 TRANSFUSION OF NONAUTOLOGOUS RED BLOOD CELLS INTO PERIPHERAL VEIN, PERCUTANEOUS APPROACH: ICD-10-PCS | Performed by: INTERNAL MEDICINE

## 2024-11-27 PROCEDURE — 72100 X-RAY EXAM L-S SPINE 2/3 VWS: CPT

## 2024-11-27 PROCEDURE — 82550 ASSAY OF CK (CPK): CPT

## 2024-11-27 PROCEDURE — 63267 EXCISE INTRSPINL LESION LMBR: CPT | Performed by: NEUROLOGICAL SURGERY

## 2024-11-27 PROCEDURE — 6370000000 HC RX 637 (ALT 250 FOR IP): Performed by: PHYSICIAN ASSISTANT

## 2024-11-27 PROCEDURE — 85014 HEMATOCRIT: CPT

## 2024-11-27 PROCEDURE — 0QB00ZZ EXCISION OF LUMBAR VERTEBRA, OPEN APPROACH: ICD-10-PCS | Performed by: NEUROLOGICAL SURGERY

## 2024-11-27 PROCEDURE — 3700000000 HC ANESTHESIA ATTENDED CARE: Performed by: NEUROLOGICAL SURGERY

## 2024-11-27 PROCEDURE — 6360000002 HC RX W HCPCS: Performed by: NEUROLOGICAL SURGERY

## 2024-11-27 PROCEDURE — 36415 COLL VENOUS BLD VENIPUNCTURE: CPT

## 2024-11-27 PROCEDURE — 22849 REINSERT SPINAL FIXATION: CPT | Performed by: PHYSICIAN ASSISTANT

## 2024-11-27 PROCEDURE — 22849 REINSERT SPINAL FIXATION: CPT | Performed by: NEUROLOGICAL SURGERY

## 2024-11-27 PROCEDURE — 86850 RBC ANTIBODY SCREEN: CPT

## 2024-11-27 PROCEDURE — 01NB0ZZ RELEASE LUMBAR NERVE, OPEN APPROACH: ICD-10-PCS | Performed by: NEUROLOGICAL SURGERY

## 2024-11-27 PROCEDURE — 85018 HEMOGLOBIN: CPT

## 2024-11-27 PROCEDURE — 99291 CRITICAL CARE FIRST HOUR: CPT | Performed by: STUDENT IN AN ORGANIZED HEALTH CARE EDUCATION/TRAINING PROGRAM

## 2024-11-27 PROCEDURE — 2720000010 HC SURG SUPPLY STERILE: Performed by: NEUROLOGICAL SURGERY

## 2024-11-27 PROCEDURE — 3600000004 HC SURGERY LEVEL 4 BASE: Performed by: NEUROLOGICAL SURGERY

## 2024-11-27 PROCEDURE — 86901 BLOOD TYPING SEROLOGIC RH(D): CPT

## 2024-11-27 PROCEDURE — 87205 SMEAR GRAM STAIN: CPT

## 2024-11-27 PROCEDURE — 87102 FUNGUS ISOLATION CULTURE: CPT

## 2024-11-27 PROCEDURE — 87077 CULTURE AEROBIC IDENTIFY: CPT

## 2024-11-27 DEVICE — ROD 1553201120 5.5 TI CP4 NS CURV 120MM
Type: IMPLANTABLE DEVICE | Site: SPINE LUMBAR | Status: FUNCTIONAL
Brand: CD HORIZON® SPINAL SYSTEM

## 2024-11-27 DEVICE — STIMULAN® RAPID CURE PROVIDED STERILE FOR SINGLE PATIENT USE. STIMULAN® RAPID CURE CONTAINS CALCIUM SULFATE POWDER AND MIXING SOLUTION IN PRE-MEASURED QUANTITIES SO THAT WHEN MIXED TOGETHER IN A STERILE MIXING BOWL, THE RESULTANT PASTE IS TO BE DIGITALLY PACKED INTO OPEN BONE VOID/GAP TO SET INSITU OR PLACED INTO THE MOULD PROVIDED, THE MIXTURE SETS TO FORM BEADS. THE BIODEGRADABLE, RADIOPAQUE BEADS ARE RESORBED IN APPROXIMATELY 30 – 60 DAYS WHEN USED IN ACCORDANCE WITH THE DEVICE LABELLING. STIMULAN® RAPID CURE IS MANUFACTURED FROM SYNTHETIC IMPLANT GRADE CALCIUM SULFATE DIHYDRATE(CASO4.2H2O) THAT RESORBS AND IS REPLACED WITH BONE DURING THE HEALING PROCESS. ALSO, AS THE BONE VOID FILLER BEADS ARE BIODEGRADABLE AND BIOCOMPATIBLE, THEY MAY BE USED AT AN INFECTED SITE.
Type: IMPLANTABLE DEVICE | Site: SPINE LUMBAR | Status: FUNCTIONAL
Brand: STIMULAN® RAPID CURE

## 2024-11-27 DEVICE — CROSSLINK 811-323 LP MLTSP PL L 2.152.95
Type: IMPLANTABLE DEVICE | Site: SPINE LUMBAR | Status: FUNCTIONAL
Brand: TSRH® SPINAL SYSTEM

## 2024-11-27 RX ORDER — ROCURONIUM BROMIDE 10 MG/ML
INJECTION, SOLUTION INTRAVENOUS
Status: DISCONTINUED | OUTPATIENT
Start: 2024-11-27 | End: 2024-11-27 | Stop reason: SDUPTHER

## 2024-11-27 RX ORDER — MORPHINE SULFATE 4 MG/ML
4 INJECTION, SOLUTION INTRAMUSCULAR; INTRAVENOUS
Status: DISCONTINUED | OUTPATIENT
Start: 2024-11-27 | End: 2024-11-29

## 2024-11-27 RX ORDER — FENTANYL CITRATE 50 UG/ML
INJECTION, SOLUTION INTRAMUSCULAR; INTRAVENOUS
Status: DISPENSED
Start: 2024-11-27 | End: 2024-11-28

## 2024-11-27 RX ORDER — SODIUM CHLORIDE 0.9 % (FLUSH) 0.9 %
5-40 SYRINGE (ML) INJECTION PRN
Status: DISCONTINUED | OUTPATIENT
Start: 2024-11-27 | End: 2024-12-03 | Stop reason: HOSPADM

## 2024-11-27 RX ORDER — PROPOFOL 10 MG/ML
INJECTION, EMULSION INTRAVENOUS
Status: DISCONTINUED | OUTPATIENT
Start: 2024-11-27 | End: 2024-11-27 | Stop reason: SDUPTHER

## 2024-11-27 RX ORDER — ONDANSETRON 2 MG/ML
4 INJECTION INTRAMUSCULAR; INTRAVENOUS EVERY 6 HOURS PRN
Status: DISCONTINUED | OUTPATIENT
Start: 2024-11-27 | End: 2024-12-03 | Stop reason: HOSPADM

## 2024-11-27 RX ORDER — HYDROCODONE BITARTRATE AND ACETAMINOPHEN 5; 325 MG/1; MG/1
2 TABLET ORAL EVERY 6 HOURS PRN
Status: DISCONTINUED | OUTPATIENT
Start: 2024-11-27 | End: 2024-11-29

## 2024-11-27 RX ORDER — BUPIVACAINE HYDROCHLORIDE 5 MG/ML
INJECTION, SOLUTION EPIDURAL; INTRACAUDAL PRN
Status: DISCONTINUED | OUTPATIENT
Start: 2024-11-27 | End: 2024-11-27 | Stop reason: ALTCHOICE

## 2024-11-27 RX ORDER — SODIUM CHLORIDE 9 MG/ML
INJECTION, SOLUTION INTRAVENOUS
Status: DISCONTINUED | OUTPATIENT
Start: 2024-11-27 | End: 2024-11-27 | Stop reason: SDUPTHER

## 2024-11-27 RX ORDER — VANCOMYCIN HYDROCHLORIDE 1 G/20ML
INJECTION, POWDER, LYOPHILIZED, FOR SOLUTION INTRAVENOUS PRN
Status: DISCONTINUED | OUTPATIENT
Start: 2024-11-27 | End: 2024-11-27 | Stop reason: ALTCHOICE

## 2024-11-27 RX ORDER — DEXAMETHASONE SODIUM PHOSPHATE 4 MG/ML
4 INJECTION, SOLUTION INTRA-ARTICULAR; INTRALESIONAL; INTRAMUSCULAR; INTRAVENOUS; SOFT TISSUE EVERY 6 HOURS
Status: DISCONTINUED | OUTPATIENT
Start: 2024-11-27 | End: 2024-11-29

## 2024-11-27 RX ORDER — SODIUM CHLORIDE 9 MG/ML
INJECTION, SOLUTION INTRAVENOUS PRN
Status: DISCONTINUED | OUTPATIENT
Start: 2024-11-27 | End: 2024-12-03 | Stop reason: HOSPADM

## 2024-11-27 RX ORDER — HYDROMORPHONE HYDROCHLORIDE 2 MG/ML
INJECTION, SOLUTION INTRAMUSCULAR; INTRAVENOUS; SUBCUTANEOUS
Status: DISCONTINUED | OUTPATIENT
Start: 2024-11-27 | End: 2024-11-27 | Stop reason: SDUPTHER

## 2024-11-27 RX ORDER — FENTANYL CITRATE 50 UG/ML
INJECTION, SOLUTION INTRAMUSCULAR; INTRAVENOUS
Status: DISCONTINUED | OUTPATIENT
Start: 2024-11-27 | End: 2024-11-27 | Stop reason: SDUPTHER

## 2024-11-27 RX ORDER — CYCLOBENZAPRINE HCL 10 MG
10 TABLET ORAL EVERY 12 HOURS PRN
Status: DISCONTINUED | OUTPATIENT
Start: 2024-11-27 | End: 2024-12-03 | Stop reason: HOSPADM

## 2024-11-27 RX ORDER — DEXMEDETOMIDINE HYDROCHLORIDE 4 UG/ML
.1-1.5 INJECTION, SOLUTION INTRAVENOUS CONTINUOUS
Status: DISCONTINUED | OUTPATIENT
Start: 2024-11-27 | End: 2024-11-28

## 2024-11-27 RX ORDER — SODIUM CHLORIDE 0.9 % (FLUSH) 0.9 %
5-40 SYRINGE (ML) INJECTION EVERY 12 HOURS SCHEDULED
Status: DISCONTINUED | OUTPATIENT
Start: 2024-11-27 | End: 2024-12-03 | Stop reason: HOSPADM

## 2024-11-27 RX ORDER — PHENYLEPHRINE HCL IN 0.9% NACL 1 MG/10 ML
SYRINGE (ML) INTRAVENOUS
Status: DISCONTINUED | OUTPATIENT
Start: 2024-11-27 | End: 2024-11-27 | Stop reason: SDUPTHER

## 2024-11-27 RX ORDER — MORPHINE SULFATE 2 MG/ML
2 INJECTION, SOLUTION INTRAMUSCULAR; INTRAVENOUS
Status: DISCONTINUED | OUTPATIENT
Start: 2024-11-27 | End: 2024-11-29

## 2024-11-27 RX ORDER — ALBUMIN HUMAN 50 G/1000ML
SOLUTION INTRAVENOUS
Status: DISCONTINUED | OUTPATIENT
Start: 2024-11-27 | End: 2024-11-27 | Stop reason: SDUPTHER

## 2024-11-27 RX ORDER — FENTANYL CITRATE 50 UG/ML
50 INJECTION, SOLUTION INTRAMUSCULAR; INTRAVENOUS EVERY 5 MIN PRN
Status: DISCONTINUED | OUTPATIENT
Start: 2024-11-27 | End: 2024-11-27 | Stop reason: HOSPADM

## 2024-11-27 RX ORDER — MIDAZOLAM HYDROCHLORIDE 1 MG/ML
INJECTION, SOLUTION INTRAMUSCULAR; INTRAVENOUS
Status: DISCONTINUED | OUTPATIENT
Start: 2024-11-27 | End: 2024-11-27 | Stop reason: SDUPTHER

## 2024-11-27 RX ADMIN — SODIUM CHLORIDE: 9 INJECTION, SOLUTION INTRAVENOUS at 12:24

## 2024-11-27 RX ADMIN — SODIUM CHLORIDE: 9 INJECTION, SOLUTION INTRAVENOUS at 18:46

## 2024-11-27 RX ADMIN — FENTANYL CITRATE 50 MCG: 50 INJECTION, SOLUTION INTRAMUSCULAR; INTRAVENOUS at 18:45

## 2024-11-27 RX ADMIN — HYDROMORPHONE HYDROCHLORIDE 1 MG: 1 INJECTION, SOLUTION INTRAMUSCULAR; INTRAVENOUS; SUBCUTANEOUS at 01:52

## 2024-11-27 RX ADMIN — FENTANYL CITRATE 50 MCG: 50 INJECTION, SOLUTION INTRAMUSCULAR; INTRAVENOUS at 18:54

## 2024-11-27 RX ADMIN — SODIUM CHLORIDE: 9 INJECTION, SOLUTION INTRAVENOUS at 15:06

## 2024-11-27 RX ADMIN — Medication 20 MG: at 15:49

## 2024-11-27 RX ADMIN — PROPOFOL 200 MG: 10 INJECTION, EMULSION INTRAVENOUS at 12:30

## 2024-11-27 RX ADMIN — CEFEPIME 2000 MG: 2 INJECTION, POWDER, FOR SOLUTION INTRAVENOUS at 09:25

## 2024-11-27 RX ADMIN — Medication 100 MCG: at 15:26

## 2024-11-27 RX ADMIN — CEFEPIME 2000 MG: 2 INJECTION, POWDER, FOR SOLUTION INTRAVENOUS at 01:07

## 2024-11-27 RX ADMIN — HYDROMORPHONE HYDROCHLORIDE 0.5 MG: 1 INJECTION, SOLUTION INTRAMUSCULAR; INTRAVENOUS; SUBCUTANEOUS at 19:56

## 2024-11-27 RX ADMIN — HYDROMORPHONE HYDROCHLORIDE 0.4 MG: 2 INJECTION INTRAMUSCULAR; INTRAVENOUS; SUBCUTANEOUS at 17:16

## 2024-11-27 RX ADMIN — CYCLOBENZAPRINE 10 MG: 10 TABLET, FILM COATED ORAL at 08:15

## 2024-11-27 RX ADMIN — FENTANYL CITRATE 50 MCG: 50 INJECTION, SOLUTION INTRAMUSCULAR; INTRAVENOUS at 19:17

## 2024-11-27 RX ADMIN — HYDROMORPHONE HYDROCHLORIDE 0.5 MG: 1 INJECTION, SOLUTION INTRAMUSCULAR; INTRAVENOUS; SUBCUTANEOUS at 05:42

## 2024-11-27 RX ADMIN — HYDROMORPHONE HYDROCHLORIDE 0.4 MG: 2 INJECTION INTRAMUSCULAR; INTRAVENOUS; SUBCUTANEOUS at 18:18

## 2024-11-27 RX ADMIN — Medication 0.2 MCG/KG/HR: at 20:37

## 2024-11-27 RX ADMIN — FENTANYL CITRATE 50 MCG: 50 INJECTION, SOLUTION INTRAMUSCULAR; INTRAVENOUS at 19:10

## 2024-11-27 RX ADMIN — SODIUM CHLORIDE: 9 INJECTION, SOLUTION INTRAVENOUS at 13:00

## 2024-11-27 RX ADMIN — ALBUMIN (HUMAN) 12.5 G: 12.5 INJECTION, SOLUTION INTRAVENOUS at 18:38

## 2024-11-27 RX ADMIN — Medication 1750 MG: at 21:17

## 2024-11-27 RX ADMIN — Medication 30 MG: at 15:20

## 2024-11-27 RX ADMIN — HYDROCODONE BITARTRATE AND ACETAMINOPHEN 2 TABLET: 5; 325 TABLET ORAL at 20:52

## 2024-11-27 RX ADMIN — CEFEPIME 2000 MG: 2 INJECTION, POWDER, FOR SOLUTION INTRAVENOUS at 12:36

## 2024-11-27 RX ADMIN — HYDROMORPHONE HYDROCHLORIDE 0.4 MG: 2 INJECTION INTRAMUSCULAR; INTRAVENOUS; SUBCUTANEOUS at 18:28

## 2024-11-27 RX ADMIN — SODIUM CHLORIDE, PRESERVATIVE FREE 10 ML: 5 INJECTION INTRAVENOUS at 21:18

## 2024-11-27 RX ADMIN — ROCURONIUM BROMIDE 50 MG: 10 INJECTION INTRAVENOUS at 12:30

## 2024-11-27 RX ADMIN — HYDROMORPHONE HYDROCHLORIDE 0.4 MG: 2 INJECTION INTRAMUSCULAR; INTRAVENOUS; SUBCUTANEOUS at 17:10

## 2024-11-27 RX ADMIN — HYDROMORPHONE HYDROCHLORIDE 1 MG: 1 INJECTION, SOLUTION INTRAMUSCULAR; INTRAVENOUS; SUBCUTANEOUS at 08:58

## 2024-11-27 RX ADMIN — Medication 1750 MG: at 06:50

## 2024-11-27 RX ADMIN — CYCLOBENZAPRINE 10 MG: 10 TABLET, FILM COATED ORAL at 00:37

## 2024-11-27 RX ADMIN — CYCLOBENZAPRINE 10 MG: 10 TABLET, FILM COATED ORAL at 20:47

## 2024-11-27 RX ADMIN — ENOXAPARIN SODIUM 30 MG: 100 INJECTION SUBCUTANEOUS at 21:07

## 2024-11-27 RX ADMIN — HYDROMORPHONE HYDROCHLORIDE 0.4 MG: 2 INJECTION INTRAMUSCULAR; INTRAVENOUS; SUBCUTANEOUS at 18:05

## 2024-11-27 RX ADMIN — DEXAMETHASONE SODIUM PHOSPHATE 4 MG: 4 INJECTION, SOLUTION INTRA-ARTICULAR; INTRALESIONAL; INTRAMUSCULAR; INTRAVENOUS; SOFT TISSUE at 21:07

## 2024-11-27 RX ADMIN — HYDROMORPHONE HYDROCHLORIDE 2 MG: 2 INJECTION INTRAMUSCULAR; INTRAVENOUS; SUBCUTANEOUS at 12:25

## 2024-11-27 RX ADMIN — MIDAZOLAM 2 MG: 1 INJECTION INTRAMUSCULAR; INTRAVENOUS at 12:26

## 2024-11-27 RX ADMIN — FENTANYL CITRATE 100 MCG: 50 INJECTION, SOLUTION INTRAMUSCULAR; INTRAVENOUS at 12:26

## 2024-11-27 RX ADMIN — HYDROMORPHONE HYDROCHLORIDE 0.5 MG: 1 INJECTION, SOLUTION INTRAMUSCULAR; INTRAVENOUS; SUBCUTANEOUS at 20:01

## 2024-11-27 RX ADMIN — SODIUM CHLORIDE: 9 INJECTION, SOLUTION INTRAVENOUS at 21:17

## 2024-11-27 ASSESSMENT — PAIN - FUNCTIONAL ASSESSMENT
PAIN_FUNCTIONAL_ASSESSMENT: ACTIVITIES ARE NOT PREVENTED
PAIN_FUNCTIONAL_ASSESSMENT: PREVENTS OR INTERFERES SOME ACTIVE ACTIVITIES AND ADLS
PAIN_FUNCTIONAL_ASSESSMENT: PREVENTS OR INTERFERES WITH ALL ACTIVE AND SOME PASSIVE ACTIVITIES
PAIN_FUNCTIONAL_ASSESSMENT: ACTIVITIES ARE NOT PREVENTED

## 2024-11-27 ASSESSMENT — PAIN DESCRIPTION - DESCRIPTORS
DESCRIPTORS: BURNING;ACHING
DESCRIPTORS: BURNING
DESCRIPTORS: ACHING;DISCOMFORT;SORE;SHARP;TENDER
DESCRIPTORS: BURNING
DESCRIPTORS: STABBING
DESCRIPTORS: BURNING

## 2024-11-27 ASSESSMENT — PAIN DESCRIPTION - ORIENTATION
ORIENTATION: LOWER
ORIENTATION: MID;POSTERIOR
ORIENTATION: LOWER
ORIENTATION: MID;LOWER
ORIENTATION: LOWER
ORIENTATION: MID;LOWER

## 2024-11-27 ASSESSMENT — PAIN DESCRIPTION - LOCATION
LOCATION: BACK

## 2024-11-27 ASSESSMENT — PAIN DESCRIPTION - PAIN TYPE
TYPE: ACUTE PAIN;CHRONIC PAIN
TYPE: SURGICAL PAIN

## 2024-11-27 ASSESSMENT — PAIN SCALES - GENERAL
PAINLEVEL_OUTOF10: 10
PAINLEVEL_OUTOF10: 0
PAINLEVEL_OUTOF10: 10
PAINLEVEL_OUTOF10: 4
PAINLEVEL_OUTOF10: 10
PAINLEVEL_OUTOF10: 10
PAINLEVEL_OUTOF10: 4
PAINLEVEL_OUTOF10: 10

## 2024-11-27 ASSESSMENT — PAIN DESCRIPTION - ONSET: ONSET: PROGRESSIVE

## 2024-11-27 ASSESSMENT — PAIN DESCRIPTION - FREQUENCY
FREQUENCY: CONTINUOUS
FREQUENCY: CONTINUOUS

## 2024-11-27 ASSESSMENT — PAIN DESCRIPTION - DIRECTION: RADIATING_TOWARDS: LEG

## 2024-11-27 NOTE — ANESTHESIA PRE PROCEDURE
Department of Anesthesiology  Preprocedure Note       Name:  Jonathan Goldsmith   Age:  49 y.o.  :  1975                                          MRN:  [id number]         Date:  2024      Surgeon: Surgeon(s):  Deb Doherty MD    Procedure: Procedure(s):  Reexploration of L3-S1 Fusion Extension to L2, L2-L3 Disc Space Debridement    Medications prior to admission:   Prior to Admission medications    Not on File       Current medications:    Current Facility-Administered Medications   Medication Dose Route Frequency Provider Last Rate Last Admin    vancomycin (VANCOCIN) 1750 mg in sodium chloride 0.9 % 500 mL IVPB  1,750 mg IntraVENous Q8H Precious Mireles, [facility]   Stopped at 24 0856    HYDROmorphone (DILAUDID) injection 0.5 mg  0.5 mg IntraVENous Q3H PRN Ty Kendrick MD   0.5 mg at 24 0542    Or    HYDROmorphone (DILAUDID) injection 1 mg  1 mg IntraVENous Q3H PRN Ty Kendrick MD   1 mg at 24 0858    enoxaparin Sodium (LOVENOX) injection 30 mg  30 mg SubCUTAneous BID Chloé Nicholson, APRN - CNP   30 mg at 24 1950    ceFEPIme (MAXIPIME) 2,000 mg in sodium chloride 0.9 % 100 mL IVPB (mini-bag)  2,000 mg IntraVENous Q8H Stef Kaminski, DO 25 mL/hr at 24 0925 2,000 mg at 24 0925    sodium chloride flush 0.9 % injection 5-40 mL  5-40 mL IntraVENous 2 times per day Lance Nicholsonlie A, APRN - CNP   10 mL at 24 1950    sodium chloride flush 0.9 % injection 5-40 mL  5-40 mL IntraVENous PRN Longmegabriella, Chloé A, APRN - CNP        0.9 % sodium chloride infusion   IntraVENous PRN LongmeLance quirozlie A, APRN - CNP        acetaminophen (TYLENOL) tablet 650 mg  650 mg Oral Q6H PRN Longmegabriella, Chloé A, APRN - CNP   650 mg at 24 1706    Or    acetaminophen (TYLENOL) suppository 650 mg  650 mg Rectal Q6H PRN Chloé Nicholson, APRN - CNP        vancomycin (VANCOCIN) intermittent dosing (placeholder)   Other RX Placeholder Chloé Nicholson, APRN - CNP

## 2024-11-27 NOTE — PROGRESS NOTES
Fall at home W19.XXXA, Y92.009    Microcytic anemia D50.9    Intravenous drug abuse (HCC) F19.10    Hepatitis C antibody test positive R76.8    Osteomyelitis M86.9    Intractable back pain M54.9    Sepsis (HCC) A41.9    Psoas abscess (HCC) K68.12    History of lumbar laminectomy Z98.890             PAST MEDICAL HISTORY        Diagnosis Date    Anemia     Discitis of lumbar region 6/27/2017    Epidural abscess     IVDU (intravenous drug user)     Osteomyelitis of spine (HCC)  multilevels  from t11 to l5 6/27/2017       PAST SURGICAL HISTORY    Past Surgical History:   Procedure Laterality Date    BACK SURGERY  02/17/2017    L5-S1 bilateral repeat laminectomies with resection of epidural granulation tissue and bilateral L5-S1 microdiscectomy  on 2/7/17    BACK SURGERY  06/30/2017    Lumbar laminectomy, L3-S1 decompression, PSF, lumbar incision and drainage--Dr Barroso    CHOLECYSTECTOMY      LUMBAR LAMINECTOMY  12/30/2016    L5 - S1 for abcess       FAMILY HISTORY    Family History   Problem Relation Age of Onset    High Blood Pressure Mother     High Blood Pressure Father        SOCIAL HISTORY    Social History     Tobacco Use    Smoking status: Former     Types: Cigarettes    Smokeless tobacco: Current     Types: Chew   Substance Use Topics    Alcohol use: Yes     Comment: rare    Drug use: Yes     Types: IV, Methamphetamines (Crystal Meth)     Comment: heroin 2 mnths ago and meth 2wks ago       ALLERGIES    Allergies   Allergen Reactions    Morphine Rash       MEDICATIONS    No current facility-administered medications on file prior to encounter.     No current outpatient medications on file prior to encounter.       REVIEW OF SYSTEMS    Constitutional: +fever, no night sweats, no fatigue.  Head: no head ache , no head injury, no migranes.  Eye: no blurring of vision, no double vision.  Ears: no hearing difficulty, no tinnitus  Mouth/throat: no ulceration, dental caries , dysphagia  Lungs: no cough, no  diffuse edema on the STIR sequence and enhancement after contrast. There is worsening fluid signal in the disc space. There are endplate erosions. In the left psoas muscle, there is a paraspinal abscess at the L2-3 level extending over a length of 2.7 cm. In the axial plane this measures 2.7 x 1.6 cm. This is consistent with an abscess extending into the left psoas muscle. This tracks into the neural foramen. There is abnormal enhancement of the medial margin of the right psoas muscle without an abscess in the psoas muscle on the right. There are bilateral pedicle screws in L3-S1. Infection in the L3 vertebral body surrounds the L3 pedicle screws. This also appears to involve the left pedicle screw at L4. All of these areas have associated abnormal signal on the STIR sequence. Above the L2 level, the vertebral bodies have normal signal.  There are no compression fractures.  The visualized aspects of the distal spinal cord are normal. The nerve roots of the cauda equina and the tip of the conus are normal. There is no abnormal enhancement of the nerve roots. There is disc desiccation in the lower thoracic spine. On the axial images, at T11-T12 and T12-L1, there are mild facet degenerative changes. There is no spinal canal or foraminal stenosis. At L1-L2, there is no spinal canal stenosis. There is no foraminal stenosis. There is some edema in the medial margin of the left psoas muscle. At L2-3, this level is described above. There is moderate to severe stenosis of the thecal sac at this level due in part to the epidural abscess. The individual nerve roots are difficult to identify. There is moderate severity bilateral foraminal stenosis at this level. At L3-4, there has been fusion. There is no spinal canal stenosis. There is stable moderate severity bilateral foraminal stenosis. There is edema throughout the psoas muscles. At L4-5, there has been fusion. There is no spinal canal stenosis. There is some edema in both  reconstructions, and/or weight-based dosing when appropriate to reduce radiation to a low as reasonably achievable.    CT CHEST W CONTRAST    Result Date: 11/24/2024  CT Chest with contrast Indication: Back pain. History of spinal osteomyelitis. Technique: CT chest with intravenous contrast. Coronal and sagittal reformations. Comparison: None Findings: Minor atelectasis. No pleural effusion, or focal consolidation. Old granulomatous disease with calcified mediastinal and hilar lymph nodes and calcified lung granulomata. No bulky mediastinal, hilar, or axillary lymphadenopathy. The heart is normal in size. No moderate or large pericardial effusion. The thoracic aorta is normal in caliber. Partially imaged upper abdomen: No upper abdominal ascites. Calcified hepatic and splenic granulomata. Prior cholecystectomy. Bones: No suspicious osseous lesions. Spondylosis. T5 hemangioma. Chronic T7 anterior wedging deformity. Paravertebral soft tissue are normal.     Impression: No acute thoracic abnormality. This document has been electronically signed by: Brenden Shi MD on 11/24/2024 09:14 PM All CTs at this facility use dose modulation techniques and iterative reconstructions, and/or weight-based dosing when appropriate to reduce radiation to a low as reasonably achievable.       Electronically signed by Mathieu Olivares MD at 6:31 AM

## 2024-11-27 NOTE — PROGRESS NOTES
Patient was seen and examined in the pre op area in conjunction with neurosurgery PA (Roberto Michaels PA-C and Elina Chavez PA-C).    There are no changes in patient symptoms and neurological exam since the last time I saw her/him.  Today I discussed with patient and her/his family again today planned/recommended surgical intervention as well as the associated risk and benefit and alternative.  All questions and concerns were addressed and answered.  Patient elected again to go with today planned surgical intervention.

## 2024-11-27 NOTE — CARE COORDINATION
11/27/24, 3:54 PM EST    DISCHARGE ON GOING EVALUATION    [facility]       [facility] day: 3  [facility]: [email or web address] OR (General) POOL R* Reason for admit: Sepsis (HCC) [A41.9]  Discitis, unspecified spinal region [M46.40]     Procedures:   11/27 OR: Reexploration of L3-S1 Fusion Extension to L2, L2-L3 Disc Space Debridement     Imaging since last note: no new    Barriers to Discharge: IV Cefepime and Vanc. PRN IV Dilaudid for pain. ID following; long term abx plan still TBD. OR today with ortho.     PCP: Lan Davenport MD  Readmission Risk Score: 10.7    Patient Goals/Plan/Treatment Preferences: Jonathan is from home with a friend, who drives him. He would of course like to return home at discharge but is open to LTACH if abx are needed. Douglass is also willing to take consult if needed. Following ID plan.

## 2024-11-27 NOTE — PROGRESS NOTES
[facility]   Pharmacy Pharmacokinetic Monitoring Service - Vancomycin    Indication: Sepsis, suspected CNS origin  Target Concentration: Goal AUC/-600 mg*hr/L  Day of Therapy: 4  Additional Antimicrobials: Cefepime    Pertinent Laboratory Values:   Wt Readings from Last 1 Encounters:   11/27/24 113.7 kg (250 lb 10.6 oz)     Temp Readings from Last 1 Encounters:   11/27/24 98.4 °F (36.9 °C) (Oral)     Estimated Creatinine Clearance: 202 mL/min (based on SCr of 0.6 mg/dL).  Recent Labs     11/24/24  1955 11/24/24  2229 11/25/24  0745 11/27/24  0536   CREATININE 0.7 0.7  --  0.6   BUN 9 8  --  8   WBC 17.5*  --  12.5*  --      Pertinent Cultures:  Date Source Results   11/24/24 BC x2  GNB   11/24/24 BCID Enterobacterales, Serratia   MRSA Nasal Swab: N/A. Non-respiratory infection.    Recent vancomycin administrations                     vancomycin (VANCOCIN) 1750 mg in sodium chloride 0.9 % 500 mL IVPB (mg) 1,750 mg New Bag 11/27/24 0650     1,750 mg New Bag 11/26/24 2231     1,750 mg New Bag  1452    vancomycin (VANCOCIN) 1,250 mg in dextrose 5 % 250 mL IVPB (mg) 1,250 mg New Bag 11/26/24 0631      Restarted  0005     1,250 mg New Bag 11/25/24 2156      Restarted  1629     1,250 mg New Bag  1408     1,250 mg New Bag  0739    vancomycin (VANCOCIN) 2500 mg in sodium chloride 0.9 % 500 mL IVPB ()  Restarted 11/24/24 2224     2,500 mg New Bag  2211                  Assessment:  Date/Time Current Dose Concentration Timing of Concentration (hr) AUC C trough,ss   11/27/24 0536 1750 mg Q8H 11.9 ~ 7 hrs 491 12.4   Note: Serum concentrations collected for AUC dosing may appear elevated if collected in close proximity to the dose administered, this is not necessarily an indication of toxicity    Plan:  Current dosing regimen is therapeutic  Continue current dose of 1750 mg IVPB Q8H  Repeat vancomycin concentration not ordered at this time  Pharmacy will monitor renal function daily and adjust therapy as

## 2024-11-27 NOTE — OP NOTE
[facility]  RECORD OF OPERATION    Patient name: Jonathan Goldsmith  Medical Record Number: [id number]  Account Number: [id number]      Date of Procedure: 11/27/2024    Pre-operative Diagnosis: ***  Post-operative Diagnosis: ***    PROCEDURE: ***    Anesthesia: General endotracheal    Surgeons/Assistants: Deb Doherty MD     Estimated Blood Loss: *** ml    Drains: ***    Blood Transfusions: None     Complications: none immediately appreciated    Specimens:  ***    Indications for Procedure: ***    PROCEDURE: The patient was intubated by Anesthesia staff while in supine  Position. Time out was performed.The *** was prepped and draped in the usual neurosurgical fashion. All bony problems were well-padded. The eyes were  well protected.   Sterile dressing was applied and the patient was then extubated and taken to physician anesthesia recovery in satisfactory condition.    Deb Doherty MD, MD  Electronically signed by me on 11/27/2024 at 6:33 PM

## 2024-11-27 NOTE — CONSULTS
CRITICAL CARE PROGRESS NOTE      Patient:  Jonathan Goldsmith    Unit/Bed:4D-15/015-A  YOB: 1975  MRN: [id number]   PCP: Lan Davenport MD  Date of Admission: 11/24/2024  Chief Complaint:-Back pain    Assessment and Plan:    Sepsis with bacteremia secondary to epidural abscess (Serratia marcescens): History of IV drug abuse, history of epidural abscess in 2017, osteomyelitis L2-L3 8/2024.  On presentation CRP 22.8, WBC 17.5 with a lactic acidosis.  CT lumbar spine showing chronic L2-L3 spondylodiscitis and severe bilateral L2-L3 neural foraminal stenosis increase since 7/27/2024.  MRI lumbar spine 11/25 showing ventral epidural abscess measuring 1.5 cm posteriorly to the disc at the L2-L3 level as well as abscess within the left psoas muscle, s/p surgical Reexploration of L3-L4, L4-L5, L5-S1 Fusion Extension to L2, L2-L3 Disc Space Debridement  11/27/2024.  Infectious disease following, recommending vancomycin and cefepime secondary to blood cultures growing Serratia marcescens 2 out of 2 (antibiotic started 11/24)  Neurosurgery following, of note during surgery patient received 5 Dilaudid, 300 fentanyl difficult to control back pain.  Will start Precedex drip  Cultures from surgical sampling from the spine pending    History of polysubstance abuse: UDS positive for methamphetamine, fentanyl  Addiction services  Consult to social work    Tobacco abuse: Encourage smoking sensation    INITIAL H AND P AND ICU COURSE:  49-year-old male PMH discitis of the lumbar region, epidural abscess, IV drug abuse, osteomyelitis of the spine presented to [facility] 11/20/2024 secondary to back pain, currently admitted to the ICU s/p surgical reexploration of the spine 11/27/2024 for ventral epidural abscess as well as abscess within the left psoas muscle.     Patient has had several admissions for osteomyelitis in the past recently in August 2024.  Treated in inpatient with ultimate discharge to [place] at the time for  Other RX Placeholder     Continuous Infusions:   dexmedeTOMIDine      sodium chloride Stopped (11/27/24 1934)       PHYSICAL EXAMINATION:  T: 98.8.  P: 83. RR: 20. B/P: 121/84 MAP of 97.  FiO2: Room air. O2 Sat: 98%.    I/O:   11/25, net +789 mL, -1.2 L urine output  11/26 net -2.2 L, -2.5 L urine output  11/27 net -1.2 L, -850 mL urine output, -1.6 blood loss from surgery  Body mass index is 32.18 kg/m².   GCS:   15    Physical Exam  Constitutional:       General: Acutely ill-appearing male resting in bed, apparently in pain, moaning in pain at bedside     Appearance: Normal appearance. He is not diaphoretic.   HENT:      Head: Normocephalic and atraumatic.   Cardiovascular:      Rate and Rhythm: Normal rate and regular rhythm.      Pulses: Normal pulses.      Heart sounds: Normal heart sounds. No murmur heard.     No gallop.   Pulmonary:      Effort: Pulmonary effort is normal. No respiratory distress.      Breath sounds: Normal breath sounds. No wheezing, rhonchi or rales.   Abdominal:      General: Abdomen is flat. Bowel sounds are normal.      Palpations: Abdomen is soft.      Tenderness: There is no abdominal tenderness.   Musculoskeletal:         General: Positive for back pain to palpation     Right lower leg: No edema.      Left lower leg: No edema.   Skin:     General: Skin is warm and dry.      Capillary Refill: Capillary refill takes less than 2 seconds.      Findings: No lesion or rash.   Neurological:      General: No focal deficit present.      Mental Status: He is alert and oriented to person, place, and time.      Cranial Nerves: No cranial nerve deficit.      Sensory: No sensory deficit.      Coordination: Coordination normal.   Psychiatric:         Mood and Affect: Mood normal.         Behavior: Behavior normal.         Thought Content: Thought content normal.         Judgment: Judgment normal.      Data: (All radiographs, tracings, PFTs, and imaging are personally viewed and interpreted unless

## 2024-11-27 NOTE — PROGRESS NOTES
colon No bulky abdominal or retroperitoneal lymphadenopathy. The abdominal aorta is normal in caliber. Urinary bladder is partially distended with a normal contour. Mildly enlarged prostate Fat containing umbilical and left inguinal hernias. Bones: Spondylosis posterior fusion L3-S1 along with multilevel posterior decompression. Chronic inferior L2 and superior L3 endplate irregularities with sclerosis and osteophytosis mainly new since 07/27/2024 and increased since 08/22/2024. No paravertebral soft tissue swelling or fluid collection L2-L3.     Impression: 1. Chronic L2-L3 spondylodiscitis. No paravertebral soft tissue swelling or fluid collection L2-L3 to suggest acute component. 2. No acute intra-abdominal abnormality. 3. Mild splenomegaly. This document has been electronically signed by: Brenden Shi MD on 11/24/2024 09:32 PM All CTs at this facility use dose modulation techniques and iterative reconstructions, and/or weight-based dosing when appropriate to reduce radiation to a low as reasonably achievable.    CT CHEST W CONTRAST    Result Date: 11/24/2024  CT Chest with contrast Indication: Back pain. History of spinal osteomyelitis. Technique: CT chest with intravenous contrast. Coronal and sagittal reformations. Comparison: None Findings: Minor atelectasis. No pleural effusion, or focal consolidation. Old granulomatous disease with calcified mediastinal and hilar lymph nodes and calcified lung granulomata. No bulky mediastinal, hilar, or axillary lymphadenopathy. The heart is normal in size. No moderate or large pericardial effusion. The thoracic aorta is normal in caliber. Partially imaged upper abdomen: No upper abdominal ascites. Calcified hepatic and splenic granulomata. Prior cholecystectomy. Bones: No suspicious osseous lesions. Spondylosis. T5 hemangioma. Chronic T7 anterior wedging deformity. Paravertebral soft tissue are normal.     Impression: No acute thoracic abnormality. This document has been  electronically signed by: Brenden Shi MD on 11/24/2024 09:14 PM All CTs at this facility use dose modulation techniques and iterative reconstructions, and/or weight-based dosing when appropriate to reduce radiation to a low as reasonably achievable.      Electronically signed by Ely Francois PA-C on 11/27/2024 at 8:21 AM

## 2024-11-28 LAB
ALBUMIN SERPL BCG-MCNC: 2.8 G/DL (ref 3.5–5.1)
ALP SERPL-CCNC: 86 U/L (ref 38–126)
ALT SERPL W/O P-5'-P-CCNC: 17 U/L (ref 11–66)
ANION GAP SERPL CALC-SCNC: 11 MEQ/L (ref 8–16)
AST SERPL-CCNC: 27 U/L (ref 5–40)
BASOPHILS ABSOLUTE: 0 THOU/MM3 (ref 0–0.1)
BASOPHILS NFR BLD AUTO: 0.3 %
BILIRUB SERPL-MCNC: 0.5 MG/DL (ref 0.3–1.2)
BUN SERPL-MCNC: 12 MG/DL (ref 7–22)
CA-I BLD ISE-SCNC: 1 MMOL/L (ref 1.12–1.32)
CALCIUM SERPL-MCNC: 8.2 MG/DL (ref 8.5–10.5)
CHLORIDE SERPL-SCNC: 105 MEQ/L (ref 98–111)
CO2 SERPL-SCNC: 23 MEQ/L (ref 23–33)
CREAT SERPL-MCNC: 0.6 MG/DL (ref 0.4–1.2)
DEPRECATED RDW RBC AUTO: 40.6 FL (ref 35–45)
EOSINOPHIL NFR BLD AUTO: 0 %
EOSINOPHILS ABSOLUTE: 0 THOU/MM3 (ref 0–0.4)
ERYTHROCYTE [DISTWIDTH] IN BLOOD BY AUTOMATED COUNT: 13.5 % (ref 11.5–14.5)
GFR SERPL CREATININE-BSD FRML MDRD: > 90 ML/MIN/1.73M2
GLUCOSE SERPL-MCNC: 126 MG/DL (ref 70–108)
HCT VFR BLD AUTO: 32.2 % (ref 42–52)
HCT VFR BLD AUTO: 33.1 % (ref 42–52)
HGB BLD-MCNC: 10.5 GM/DL (ref 14–18)
HGB BLD-MCNC: 10.5 GM/DL (ref 14–18)
IMM GRANULOCYTES # BLD AUTO: 0.05 THOU/MM3 (ref 0–0.07)
IMM GRANULOCYTES NFR BLD AUTO: 0.5 %
LYMPHOCYTES ABSOLUTE: 0.7 THOU/MM3 (ref 1–4.8)
LYMPHOCYTES NFR BLD AUTO: 6.4 %
MCH RBC QN AUTO: 26.1 PG (ref 26–33)
MCHC RBC AUTO-ENTMCNC: 31.7 GM/DL (ref 32.2–35.5)
MCV RBC AUTO: 82.1 FL (ref 80–94)
MONOCYTES ABSOLUTE: 0.7 THOU/MM3 (ref 0.4–1.3)
MONOCYTES NFR BLD AUTO: 6.5 %
MRSA DNA SPEC QL NAA+PROBE: NEGATIVE
NEUTROPHILS ABSOLUTE: 8.9 THOU/MM3 (ref 1.8–7.7)
NEUTROPHILS NFR BLD AUTO: 86.3 %
NRBC BLD AUTO-RTO: 0 /100 WBC
PLATELET # BLD AUTO: 268 THOU/MM3 (ref 130–400)
PMV BLD AUTO: 9.7 FL (ref 9.4–12.4)
POTASSIUM SERPL-SCNC: 4.3 MEQ/L (ref 3.5–5.2)
PROT SERPL-MCNC: 6.2 G/DL (ref 6.1–8)
RBC # BLD AUTO: 4.03 MILL/MM3 (ref 4.7–6.1)
SODIUM SERPL-SCNC: 139 MEQ/L (ref 135–145)
WBC # BLD AUTO: 10.3 THOU/MM3 (ref 4.8–10.8)

## 2024-11-28 PROCEDURE — 36415 COLL VENOUS BLD VENIPUNCTURE: CPT

## 2024-11-28 PROCEDURE — 6360000002 HC RX W HCPCS

## 2024-11-28 PROCEDURE — 2580000003 HC RX 258

## 2024-11-28 PROCEDURE — 85014 HEMATOCRIT: CPT

## 2024-11-28 PROCEDURE — 99233 SBSQ HOSP IP/OBS HIGH 50: CPT | Performed by: INTERNAL MEDICINE

## 2024-11-28 PROCEDURE — 80053 COMPREHEN METABOLIC PANEL: CPT

## 2024-11-28 PROCEDURE — 82330 ASSAY OF CALCIUM: CPT

## 2024-11-28 PROCEDURE — 6360000002 HC RX W HCPCS: Performed by: INTERNAL MEDICINE

## 2024-11-28 PROCEDURE — 2060000000 HC ICU INTERMEDIATE R&B

## 2024-11-28 PROCEDURE — 6370000000 HC RX 637 (ALT 250 FOR IP): Performed by: PHYSICIAN ASSISTANT

## 2024-11-28 PROCEDURE — 6360000002 HC RX W HCPCS: Performed by: PHYSICIAN ASSISTANT

## 2024-11-28 PROCEDURE — APPSS30 APP SPLIT SHARED TIME 16-30 MINUTES: Performed by: PHYSICIAN ASSISTANT

## 2024-11-28 PROCEDURE — 85018 HEMOGLOBIN: CPT

## 2024-11-28 PROCEDURE — 2500000003 HC RX 250 WO HCPCS

## 2024-11-28 PROCEDURE — 99233 SBSQ HOSP IP/OBS HIGH 50: CPT | Performed by: STUDENT IN AN ORGANIZED HEALTH CARE EDUCATION/TRAINING PROGRAM

## 2024-11-28 PROCEDURE — 85025 COMPLETE CBC W/AUTO DIFF WBC: CPT

## 2024-11-28 PROCEDURE — 6370000000 HC RX 637 (ALT 250 FOR IP)

## 2024-11-28 RX ORDER — 0.9 % SODIUM CHLORIDE 0.9 %
500 INTRAVENOUS SOLUTION INTRAVENOUS ONCE
Status: COMPLETED | OUTPATIENT
Start: 2024-11-28 | End: 2024-11-28

## 2024-11-28 RX ORDER — ACETAMINOPHEN 325 MG/1
650 TABLET ORAL EVERY 6 HOURS PRN
Status: DISCONTINUED | OUTPATIENT
Start: 2024-11-28 | End: 2024-12-03 | Stop reason: HOSPADM

## 2024-11-28 RX ORDER — PANTOPRAZOLE SODIUM 40 MG/1
40 TABLET, DELAYED RELEASE ORAL
Status: DISCONTINUED | OUTPATIENT
Start: 2024-11-28 | End: 2024-12-03 | Stop reason: HOSPADM

## 2024-11-28 RX ORDER — SODIUM CHLORIDE 9 MG/ML
INJECTION, SOLUTION INTRAVENOUS CONTINUOUS
Status: DISCONTINUED | OUTPATIENT
Start: 2024-11-28 | End: 2024-11-29

## 2024-11-28 RX ORDER — DEXTROSE MONOHYDRATE 100 MG/ML
INJECTION, SOLUTION INTRAVENOUS CONTINUOUS PRN
Status: DISCONTINUED | OUTPATIENT
Start: 2024-11-28 | End: 2024-12-03 | Stop reason: HOSPADM

## 2024-11-28 RX ORDER — GLUCAGON 1 MG/ML
1 KIT INJECTION PRN
Status: DISCONTINUED | OUTPATIENT
Start: 2024-11-28 | End: 2024-12-03 | Stop reason: HOSPADM

## 2024-11-28 RX ADMIN — CEFEPIME 2000 MG: 2 INJECTION, POWDER, FOR SOLUTION INTRAVENOUS at 01:18

## 2024-11-28 RX ADMIN — CEFEPIME 2000 MG: 2 INJECTION, POWDER, FOR SOLUTION INTRAVENOUS at 10:12

## 2024-11-28 RX ADMIN — MORPHINE SULFATE 4 MG: 4 INJECTION, SOLUTION INTRAMUSCULAR; INTRAVENOUS at 21:04

## 2024-11-28 RX ADMIN — DEXAMETHASONE SODIUM PHOSPHATE 4 MG: 4 INJECTION, SOLUTION INTRA-ARTICULAR; INTRALESIONAL; INTRAMUSCULAR; INTRAVENOUS; SOFT TISSUE at 14:09

## 2024-11-28 RX ADMIN — DEXAMETHASONE SODIUM PHOSPHATE 4 MG: 4 INJECTION, SOLUTION INTRA-ARTICULAR; INTRALESIONAL; INTRAMUSCULAR; INTRAVENOUS; SOFT TISSUE at 09:12

## 2024-11-28 RX ADMIN — HYDROMORPHONE HYDROCHLORIDE 1 MG: 1 INJECTION, SOLUTION INTRAMUSCULAR; INTRAVENOUS; SUBCUTANEOUS at 09:11

## 2024-11-28 RX ADMIN — SODIUM CHLORIDE, PRESERVATIVE FREE 10 ML: 5 INJECTION INTRAVENOUS at 21:09

## 2024-11-28 RX ADMIN — CYCLOBENZAPRINE 10 MG: 10 TABLET, FILM COATED ORAL at 21:04

## 2024-11-28 RX ADMIN — CEFEPIME 2000 MG: 2 INJECTION, POWDER, FOR SOLUTION INTRAVENOUS at 16:45

## 2024-11-28 RX ADMIN — MORPHINE SULFATE 4 MG: 4 INJECTION, SOLUTION INTRAMUSCULAR; INTRAVENOUS at 15:09

## 2024-11-28 RX ADMIN — CYCLOBENZAPRINE 10 MG: 10 TABLET, FILM COATED ORAL at 09:11

## 2024-11-28 RX ADMIN — Medication 0.4 MCG/KG/HR: at 03:50

## 2024-11-28 RX ADMIN — DEXAMETHASONE SODIUM PHOSPHATE 4 MG: 4 INJECTION, SOLUTION INTRA-ARTICULAR; INTRALESIONAL; INTRAMUSCULAR; INTRAVENOUS; SOFT TISSUE at 03:43

## 2024-11-28 RX ADMIN — SODIUM CHLORIDE: 9 INJECTION, SOLUTION INTRAVENOUS at 08:39

## 2024-11-28 RX ADMIN — Medication 1750 MG: at 22:39

## 2024-11-28 RX ADMIN — Medication 1750 MG: at 05:46

## 2024-11-28 RX ADMIN — ENOXAPARIN SODIUM 30 MG: 100 INJECTION SUBCUTANEOUS at 09:11

## 2024-11-28 RX ADMIN — ACETAMINOPHEN 650 MG: 325 TABLET ORAL at 09:11

## 2024-11-28 RX ADMIN — HYDROCODONE BITARTRATE AND ACETAMINOPHEN 2 TABLET: 5; 325 TABLET ORAL at 11:55

## 2024-11-28 RX ADMIN — HYDROCODONE BITARTRATE AND ACETAMINOPHEN 2 TABLET: 5; 325 TABLET ORAL at 04:07

## 2024-11-28 RX ADMIN — Medication 1750 MG: at 14:14

## 2024-11-28 RX ADMIN — SODIUM CHLORIDE 500 ML: 9 INJECTION, SOLUTION INTRAVENOUS at 08:39

## 2024-11-28 RX ADMIN — ENOXAPARIN SODIUM 30 MG: 100 INJECTION SUBCUTANEOUS at 21:04

## 2024-11-28 RX ADMIN — DEXAMETHASONE SODIUM PHOSPHATE 4 MG: 4 INJECTION, SOLUTION INTRA-ARTICULAR; INTRALESIONAL; INTRAMUSCULAR; INTRAVENOUS; SOFT TISSUE at 22:40

## 2024-11-28 ASSESSMENT — PAIN DESCRIPTION - ORIENTATION
ORIENTATION: LOWER
ORIENTATION: MID;POSTERIOR
ORIENTATION: MID

## 2024-11-28 ASSESSMENT — PAIN DESCRIPTION - DESCRIPTORS
DESCRIPTORS: SHARP
DESCRIPTORS: ACHING;DISCOMFORT
DESCRIPTORS: ACHING

## 2024-11-28 ASSESSMENT — PAIN SCALES - GENERAL
PAINLEVEL_OUTOF10: 8
PAINLEVEL_OUTOF10: 7
PAINLEVEL_OUTOF10: 4
PAINLEVEL_OUTOF10: 3
PAINLEVEL_OUTOF10: 9
PAINLEVEL_OUTOF10: 6

## 2024-11-28 ASSESSMENT — PAIN SCALES - WONG BAKER
WONGBAKER_NUMERICALRESPONSE: NO HURT
WONGBAKER_NUMERICALRESPONSE: NO HURT

## 2024-11-28 ASSESSMENT — PAIN DESCRIPTION - LOCATION
LOCATION: BACK

## 2024-11-28 ASSESSMENT — PAIN DESCRIPTION - PAIN TYPE: TYPE: SURGICAL PAIN

## 2024-11-28 ASSESSMENT — PAIN - FUNCTIONAL ASSESSMENT: PAIN_FUNCTIONAL_ASSESSMENT: ACTIVITIES ARE NOT PREVENTED

## 2024-11-28 NOTE — PROGRESS NOTES
1935 Patient arrives to PACU, resp easy and unlabored on room air. Not yet alert to voice. VSS    1945 Pt continues to rest in bed with eyes closed, responds to voice then drifts off to sleep easily. Pt not yet following commands at this time. VSS    1953 Patient awakens, follows commands. Pt begins to cry and yell in pain.     1956 0.5 mg of Dilaudid given at this time.     2001 Patient continuing to yell in pain, 0.5 mg of Dilaudid given at this time.     2010 Report given to Keily COX. Pt meets criteria for discharge from PACU    2020 Pt to ICU in stable condition with this RN    2025 Bedside update given to Keily COX.    2028 Pt sister called and updated on pt care.

## 2024-11-28 NOTE — ANESTHESIA POSTPROCEDURE EVALUATION
Department of Anesthesiology  Postprocedure Note    Patient: Jonathan Goldsmith  MRN: [id number]  YOB: 1975  Date of evaluation: 11/27/2024    Procedure Summary       Date: 11/27/24 Room / Location: [facility] OR 03 / [facility] OR    Anesthesia Start: 1224 Anesthesia Stop: 1936    Procedure: Reexploration of L3-L4, L4-L5, L5-S1 Fusion Extension to L2, L2-L3 Disc Space Debridement (Head) Diagnosis:       Bilateral back pain, unspecified back location, unspecified chronicity      (Bilateral back pain, unspecified back location, unspecified chronicity [M54.9])    Surgeons: Deb Doherty MD Responsible Provider: Isaac Thrasher DO    Anesthesia Type: general ASA Status: 3            Anesthesia Type: No value filed.    Leann Phase I: Leann Score: 9    Leann Phase II:      Anesthesia Post Evaluation    Patient location during evaluation: PACU  Patient participation: complete - patient participated  Level of consciousness: awake and alert  Airway patency: patent  Nausea & Vomiting: no vomiting and no nausea  Cardiovascular status: hemodynamically stable  Respiratory status: acceptable  Hydration status: stable  Pain management: adequate    No notable events documented.

## 2024-11-28 NOTE — PROGRESS NOTES
Patient arrived to unit from surgery via bed. Patient transferred to ICU bed and placed on continuous ICU bedside monitor. Patient admitted for Sepsis (HCC) [A41.9]  Discitis, unspecified spinal region [M46.40]. Vitals obtained. See flowsheets. Patient's IV access includes 20g and 22g in R forearm. Current infusions and rates of infusion include normal saline to gravity. Assessment completed by Evaristo iMchaud. Two nurse skin assessment completed by EVARISTO Michaud and EVARISTO Miller. See flowsheets for assessment details. Policies and procedures of ICU able to be explained to patient at this time. Family member(s)/representative(s) present at time of admission include na. Patient rights explained to family member(s)/representatives and patient, as able. Patient/patient's family member(s)/representative(s) Declined to have physician notified of their admission. All questions posed by patient's family member(s)/representative(s) and patient answered at this time.

## 2024-11-28 NOTE — PLAN OF CARE
Jonathan was under hospitalist service on admission,  Pt underwent surgery with Dr. Doherty 11/27, was monitored in ICU post operatively and is transitioning to step down unit today 11/28.     No intraoperative complications noted, however 1000cc EBL noted in Op note.  PT afebrile, hemodynamically stable with normal to soft Bps, however has been on precedex. Seen in ICU- BP stable with systolic in 115mmHg.       Spoke with RN, PT. Discussed plan for discharge today and hospitalist service to assume care at this time. Will formally round on Pt in AM.       Electronically signed by Ely Francois PA-C on 11/28/2024 at 1:56 PM

## 2024-11-28 NOTE — PROGRESS NOTES
recently 08/2024), lumbar surgery complicated by epidural abscess (2017) who presented on 11/24/2024 for intractable back pain.  Onset of symptoms 3 days prior with back pain, subjective fever, chills, \"burning\" sensation in left leg.  His most recent inpatient stay was in 08/20/2024 for osteomyelitis with discharge to [place] for extended IV antibiotics; however, he left AMA without completing antibiotics.     At admission CT lumbar spine significant for chronic L2-L3 discitis and severe bilateral L2-L3 neural foramina stenosis- increased since previous scan on 7/27/2024.  Antibiotics initiated with vancomycin (11/24-present), ceftriaxone (11/24-11/25), cefepime (11/25-present).  MRI lumbar spine (11/25/2024) showing ventral epidural abscess of 1.5 cm posterior to the disc at the L2-L3 level, abscess left psoas muscle, now s/p 11/27/2024 surgical reexploration L3-L4, L4-L5, L5-S1, fusion extension to L2, L2-L3 disc space debridement with neurosurgery.  ID consulted for Serratia marcescens bacteremia.    Past Medical History:  polysubstance abuse (including IV amphetamine use), Hx + hepatitis C antibodies, tobacco use disorder (1 PPD), multiple admission for spinal osteomyelitis (most recently 08/2024), lumbar surgery complicated by epidural abscess (2017)  Family History: Mother-hypertension, father-hypertension  Social History:    Tobacco: Former tobacco use including chewing tobacco (dates unclear)  Alcohol: \"Rare\"  Illicit drugs: IVDU, methamphetamines, heroin    ROS   Review of Systems   Respiratory:  Negative for shortness of breath.    Cardiovascular:  Negative for chest pain and palpitations.   Gastrointestinal:  Negative for abdominal pain, nausea and vomiting.   Neurological:  Negative for dizziness, light-headedness and headaches.     Scheduled Meds:   sodium chloride flush  5-40 mL IntraVENous 2 times per day    dexAMETHasone  4 mg IntraVENous Q6H    fentaNYL        vancomycin  1,750 mg IntraVENous Q8H  face evaluation and examination was performed. Case discussed with Dr. Leatha Ricardo MD  -resident physician.Agree with resident's findings and plan as documented in the resident's note. Italicized font, if present,  represents changes to the note made by me.   More than 50% of the encounter time involved with patient care by the Pulmonary & Critical care service team spent by me.     Please see my modifications mentioned below:  Patient is ill-looking  Not in any acute distress    No results found for: \"PH\", \"PCO2\", \"PO2\", \"HCO3\", \"O2SAT\"  No results found for: \"IFIO2\", \"MODE\", \"SETTIDVOL\", \"SETPEEP\"    CBC:   Recent Labs     11/27/24 0536 11/27/24 2035 11/28/24 0543   WBC 11.0*  --  10.3   HGB 11.8* 11.3* 10.5*   HCT 38.0* 35.4* 33.1*     --  268     BMP:  Recent Labs     11/27/24 0536 11/28/24  0543    139   K 3.8 4.3   CL 99 105   CO2 24 23   BUN 8 12   CREATININE 0.6 0.6   GLUCOSE 104 126*   CALCIUM 8.5 8.2*     Hepatic:   Recent Labs     11/28/24  0543   AST 27   ALT 17   BILITOT 0.5   ALKPHOS 86     Cardiac Enzymes:   Recent Labs     11/27/24 2035   CKTOTAL 372*     BNP: No results for input(s): \"BNP\" in the last 72 hours.  INR: No results for input(s): \"INR\", \"PROTIME\" in the last 72 hours.  POC No results for input(s): \"POCGLU\" in the last 72 hours.  No results for input(s): \"LACTA\" in the last 72 hours.     dextrose      sodium chloride Stopped (11/28/24 1012)    sodium chloride      sodium chloride 20 mL/hr at 11/28/24 0828        pantoprazole  40 mg Oral QAM AC    sodium chloride flush  5-40 mL IntraVENous 2 times per day    dexAMETHasone  4 mg IntraVENous Q6H    vancomycin  1,750 mg IntraVENous Q8H    enoxaparin  30 mg SubCUTAneous BID    cefepime  2,000 mg IntraVENous Q8H    sodium chloride flush  5-40 mL IntraVENous 2 times per day    vancomycin (VANCOCIN) intermittent dosing (placeholder)   Other RX Placeholder       24HR INTAKE/OUTPUT:    Intake/Output Summary (Last 24 hours) at  11/28/2024 1412  Last data filed at 11/28/2024 1350  Gross per 24 hour   Intake 8173.1 ml   Output 3080 ml   Net 5093.1 ml     CT scan of chest with contrast performed on 24 November 2024:  IMPRESSION:  Impression:  No acute thoracic abnormality.      PUD and DVT prophylaxis reviewed.  Protonix 40 mg p.o. daily  Lovenox 30 mg SQ twice daily  I spoke with Dr. Deb Doherty MD from neurosurgery service regarding the patient management plan.  Patient will be transferred to medical floor under hospitalist service due to clinical stability.    Electronically signed by   Donald Sandoval MD on 11/28/2024 at 2:12 PM

## 2024-11-28 NOTE — PROGRESS NOTES
[facility] Infectious Disease         Progress Note      Jonathan Goldsmith  MEDICAL RECORD NUMBER:  [id number]  AGE: 49 y.o.   GENDER: male  : 1975  EPISODE DATE:  2024      Assessment:     Patient is a 49-year-old male who I am consulted for epidural abscess.  Blood cultures positive for Serratia.  I would recommend repeat blood cultures on .  Currently on therapy with vancomycin and cefepime.     Ventral epidural abscess: 1.5 cm at the L2-3 level.     Left psoas abscess: 2.7x1.6 cm. Also enhancement of the right psoas without asbscess.     Discitis/osteomyelitis at L2-3 level: surrounding L3 pedicle screws and left pedicle screw at L4.     Gram negative bacteremia: molecular ID showing serratia marcescens. Blood cultures x 2 growing gm neg bacilli. cefepime susceptible.    Will continue vancomycin and cefepime for staph and strep in the setting of IVDU as well as cefepime for pseudomonas and gm negative coverage given serratia species showing on blood molecular ID  Status post surgical intervention on  with fusion extension to L2 and L2, L3 disc space debridement    Vancomycin therapeutic drug monitoring  -Drawn 30 minutes prior to infusion for trough level  -Assessment of vancomycin toxicity: Nephrotoxicity and infusion reactions  -Labs evaluated include CBC, serum creatinine and vancomycin trough    I will follow cultures obtained from surgical intervention, if no additional growth to Serratia, will follow treatment plan as outlined below   Would wait till 72 hours given potential for delayed growth  In the setting of vertebral osteomyelitis and psoas abscess, I would recommend either continuation of cefepime or transition to ciprofloxacin to complete a 6-week course following surgical intervention  This will largely depend on if patient is willing to be admitted to long-term care for IV antimicrobials, if he is deemed high risk for leaving AGAINST MEDICAL ADVICE, it would  granulomata. Prior cholecystectomy. Bones: No suspicious osseous lesions. Spondylosis. T5 hemangioma. Chronic T7 anterior wedging deformity. Paravertebral soft tissue are normal.     Impression: No acute thoracic abnormality. This document has been electronically signed by: Brenden Shi MD on 11/24/2024 09:14 PM All CTs at this facility use dose modulation techniques and iterative reconstructions, and/or weight-based dosing when appropriate to reduce radiation to a low as reasonably achievable.       Electronically signed by Mathieu Olivares MD at 6:05 AM

## 2024-11-28 NOTE — PROGRESS NOTES
Neurosurgery Progress Note    Patient:  Jonathan Goldsmith      Unit/Bed:4D-15/015-A    YOB: 1975    MRN: [id number]     Acct: [id number]     Admit date: 11/24/2024    Chief Complaint   Patient presents with    Back Pain       Patient Seen, Chart, Physician notes, Labs, Radiology studies reviewed.    Subjective: Patient is seen and evaluated in the ICU setting with evaluation exam findings reviewed discussed with Dr. Doherty/neurosurgeon and with nursing.  Is resting comfortably postoperative day 1 from reexploration revision of posterior lumbar fusion for ID and debridement of lumbar disc base with extension of the fusion to the pelvis and lumbar 3.  Pain is better controlled today        Past, Family, Social History unchanged from admission.    Diet:  ADULT DIET; Regular    Medications:  Scheduled Meds:   pantoprazole  40 mg Oral QAM AC    sodium chloride flush  5-40 mL IntraVENous 2 times per day    dexAMETHasone  4 mg IntraVENous Q6H    vancomycin  1,750 mg IntraVENous Q8H    enoxaparin  30 mg SubCUTAneous BID    cefepime  2,000 mg IntraVENous Q8H    sodium chloride flush  5-40 mL IntraVENous 2 times per day    vancomycin (VANCOCIN) intermittent dosing (placeholder)   Other RX Placeholder     Continuous Infusions:   dextrose      sodium chloride 125 mL/hr at 11/28/24 0839    sodium chloride      dexmedeTOMIDine Stopped (11/28/24 0916)    sodium chloride 20 mL/hr at 11/28/24 0828     PRN Meds:glucose, dextrose bolus **OR** dextrose bolus, glucagon (rDNA), dextrose, acetaminophen, sodium chloride flush, sodium chloride, morphine **OR** morphine, HYDROcodone 5 mg - acetaminophen, cyclobenzaprine, ondansetron, HYDROmorphone **OR** HYDROmorphone, sodium chloride flush, sodium chloride    Objective: Patient is lying in bed with head of the bed appropriately elevated and pain well to moderately controlled.  Dressings are intact over flat dry incision and the surgical drain is intact and functioning with  T7 anterior wedging deformity. Paravertebral soft tissue are normal.     Impression: No acute thoracic abnormality. This document has been electronically signed by: Brenden Shi MD on 11/24/2024 09:14 PM All CTs at this facility use dose modulation techniques and iterative reconstructions, and/or weight-based dosing when appropriate to reduce radiation to a low as reasonably achievable.                   Assessment: Postoperative day 1 from reexploration revision of posterior instrumented fusion for disc space debridement at L2-3 with extension of the fusion to the pelvis and to lumbar 3    Principal Problem:    Sepsis (HCC)  Active Problems:    Discitis    Intravenous drug abuse (HCC)    Psoas abscess (HCC)    History of lumbar laminectomy    Gram-negative bacteremia  Resolved Problems:    * No resolved hospital problems. *        Plan: Lumbar 3 patient is seen and evaluated in the ICU setting with evaluation and exam findings reviewed and discussed with Dr. Doherty/neurosurgeon and with nursing.  Patient is resting comfortably postoperative day 1 from reexploration revision of posterior instrumented fusion for displaced debridement at lumbar 2 3 with extension of the fusion to the pelvis and to lumbar 3 as performed by Dr. Doherty/neurosurgeon, without complication.  Patient is resting comfortably with pain control improved.  Dressings are intact over flat dry incision and the surgical drain is intact and functioning with approximately 80 cc of output noted.  Anticipated transition from the ICU setting to stepdown as indicated with PT, OT, and up to a chair as tolerated.  Infectious disease to follow with surgical suture removal recommended at 14 days post operative.  Neurosurgery to follow      Electronically signed by Roberto Michaels PA-C on 11/28/2024 at 11:53 AM    Neurosurgery

## 2024-11-28 NOTE — TRANSFER CENTER NOTE
Vital signs stable and he no longer requires critical care level management.  Patient transferred to [facility] and signed out via [employer] to Dr. Kriss Tian.

## 2024-11-28 NOTE — BRIEF OP NOTE
Brief Postoperative Note      Patient: Jonathan Goldsmith  YOB: 1975  MRN: [id number]    Date of Procedure: 11/27/2024    Pre-Op Diagnosis Codes:      * Bilateral back pain, unspecified back location, unspecified chronicity [M54.9]    Post-Op Diagnosis: Same       Procedure(s):  Reexploration of L3-L4, L4-L5, L5-S1 Fusion Extension to L2, L2-L3 Disc Space Debridement    Surgeon(s):  Deb Doherty MD    Assistant:  Physician Assistant: Roberto Michaels PA-C    Anesthesia: General    Estimated Blood Loss (mL): 1000    Complications: None    Specimens:   ID Type Source Tests Collected by Time Destination   1 : L2, L3, and L4 tissue, fluid, and swabs for culture Bone Spine CULTURE, ANAEROBIC AND AEROBIC Deb Doherty MD 11/27/2024 1349    2 : L2, L3, and L4 tissue, fluid, and swabs for culture Swab Spine CULTURE, ANAEROBIC AND AEROBIC Deb Doherty MD 11/27/2024 1357    3 : L2, L3, and L4 tissue, fluid, and swabs for culture Tissue Spine CULTURE, FUNGUS, GRAM STAIN (Canceled), CULTURE, ANAEROBIC AND AEROBIC Deb Doherty MD 11/27/2024 1357    4 : Removed hardware Hardware Spine CULTURE, ANAEROBIC AND AEROBIC Deb Doherty MD 11/27/2024 1426    5 : L2, L3, and L4 tissue, fluid, and swabs for culture Tissue Spine CULTURE, FUNGUS, GRAM STAIN (Canceled), CULTURE, ANAEROBIC AND AEROBIC Deb Doherty MD 11/27/2024 1743        Implants:  Implant Name Type Inv. Item Serial No.  Lot No. LRB No. Used Action   SCREW SPNL L55MM DIA6.5MM POST THORACOLUMBOSACRAL CO CHROM [id number]] [employer] DANEK] - [id number]  SCREW SPNL L55MM DIA6.5MM POST THORACOLUMBOSACRAL CO CHROM [id number]] [employer] SOFAMOR DANEK]  MED[employer] SOFAMOR DANEK-WD  N/A 2 Implanted   SCREW SPNL L50MM DIA7.5MM POST THORACOLUMBOSACRAL CO CHROM - [id number]  SCREW SPNL L50MM DIA7.5MM POST THORACOLUMBOSACRAL CO CHROM  [employer] SOFAMOR DANEK-WD  N/A 2 Implanted   SCREW SPNL L40MM DIA7.5MM POST THORACOLUMBOSACRAL CO CHROM - [id number]   SCREW SPNL L40MM DIA7.5MM POST THORACOLUMBOSACRAL CO CHROM  MED[employer] SOFAMOR DANEK-WD  N/A 1 Implanted   SCREW SPNL L45MM DIA8.5MM POST THORACOLUMBOSACRAL CO CHROM - [id number]  SCREW SPNL L45MM DIA8.5MM POST THORACOLUMBOSACRAL CO CHROM  MEDTRONIC SOFAMOR DANEK-WD  N/A 2 Implanted   SCREW SPNL L50MM DIA8.5MM POST THORACOLUMBOSACRAL CO CHROM - [id number]  SCREW SPNL L50MM DIA8.5MM POST THORACOLUMBOSACRAL CO CHROM  MEDTRONIC SOFAMOR DANEK-WD  N/A 3 Implanted   SCREW SPNL L80MM DIA8.5MM POST THORLUM CO CHROM TI - [id number]  SCREW SPNL L80MM DIA8.5MM POST THORLUM CO CHROM TI  MEDTR[employer]IC SOFAMOR DANEK-WD  N/A 2 Implanted   GRAFT BNE SUB 10CC BEAD 25CC CA SULPHATE RAP SET W/ INDIV - [id number]  GRAFT BNE SUB 10CC BEAD 25CC CA SULPHATE RAP SET W/ INDIV  [employer]WD [id number] N/A 1 Implanted   CROSSLINK SPNL L2.15-2.95IN POST THORLUM TI MSPAN PLT FIX - [id number]  CROSSLINK SPNL L2.15-2.95IN POST THORLUM TI MSPAN PLT FIX  MEDTRONIC SOFAMOR DANEK-WD  N/A 1 Implanted   SET SCR SPNL L6MM DIA5.5MM TI BRK OFF DEANDRE W/ DETACH [facility] - [id number]  SET SCR SPNL L6MM DIA5.5MM TI BRK OFF DEANDRE W/ DETACH [facility]  MED[employer] SOFAMOR DANEK-WD  N/A 12 Implanted   GIOVANNA SPNL L120MM DIA5.5MM TI ANT POST THORACOLUMBOSACRAL CRV - [id number]  GIOVANNA SPNL L120MM DIA5.5MM TI ANT POST THORACOLUMBOSACRAL CRV  MEDTRONIC SOFAMOR DANEK-WD  N/A 2 Implanted         Drains: Hemovac  Closed/Suction Drain Inferior Back Accordion (Active)       Urinary Catheter 11/27/24 Marquez-Temperature;2 Way (Active)       Findings:  Infection Present At Time Of Surgery (PATOS) (choose all levels that have infection present):  - Deep Infection (muscle/fascia) present as evidenced by pus  Other Findings: Reexploration revision of posterior lumbar instrumented fusion for extension of the fusion to lumbar 3 with concurrent disc space debridement at L2-3    Electronically signed by Roberto Michaels PA-C on 11/27/2024 at 7:26 PM

## 2024-11-28 NOTE — PROGRESS NOTES
1524--SW went to room 4D-15 and was informed by an RN that pt was moved to room 4A-11.   SW then went to the room and RN at [facility] stated pt just got to his new room within the last 20-30 minutes and is very grumpy. SW checked in the room and pt did not respond to SW's greeting him. RN suggested meal time btw 5:30-6 p m might be better.         1963--SW attempted consult again but pt was sleeping. He turned his head towards SW when calling out his name. He asked SW to come back later. Pt had his dinner on the tray in front of him (unsure if he had eaten anything) and the lights were out.   SADIA to continue attempting.

## 2024-11-29 PROBLEM — Z98.1 STATUS POST LUMBAR SPINAL FUSION: Status: ACTIVE | Noted: 2024-11-29

## 2024-11-29 LAB
ALBUMIN SERPL BCG-MCNC: 2.9 G/DL (ref 3.5–5.1)
ALP SERPL-CCNC: 72 U/L (ref 38–126)
ALT SERPL W/O P-5'-P-CCNC: 14 U/L (ref 11–66)
ANION GAP SERPL CALC-SCNC: 11 MEQ/L (ref 8–16)
AST SERPL-CCNC: 20 U/L (ref 5–40)
BASOPHILS ABSOLUTE: 0 THOU/MM3 (ref 0–0.1)
BASOPHILS NFR BLD AUTO: 0.1 %
BILIRUB SERPL-MCNC: 0.2 MG/DL (ref 0.3–1.2)
BUN SERPL-MCNC: 16 MG/DL (ref 7–22)
CALCIUM SERPL-MCNC: 8.1 MG/DL (ref 8.5–10.5)
CHLORIDE SERPL-SCNC: 106 MEQ/L (ref 98–111)
CO2 SERPL-SCNC: 23 MEQ/L (ref 23–33)
CREAT SERPL-MCNC: 0.6 MG/DL (ref 0.4–1.2)
DEPRECATED RDW RBC AUTO: 41.4 FL (ref 35–45)
EOSINOPHIL NFR BLD AUTO: 0.1 %
EOSINOPHILS ABSOLUTE: 0 THOU/MM3 (ref 0–0.4)
ERYTHROCYTE [DISTWIDTH] IN BLOOD BY AUTOMATED COUNT: 13.7 % (ref 11.5–14.5)
GFR SERPL CREATININE-BSD FRML MDRD: > 90 ML/MIN/1.73M2
GLUCOSE SERPL-MCNC: 119 MG/DL (ref 70–108)
HCT VFR BLD AUTO: 31.7 % (ref 42–52)
HCT VFR BLD AUTO: 31.9 % (ref 42–52)
HCT VFR BLD AUTO: 33.3 % (ref 42–52)
HGB BLD-MCNC: 10.2 GM/DL (ref 14–18)
HGB BLD-MCNC: 10.3 GM/DL (ref 14–18)
HGB BLD-MCNC: 10.3 GM/DL (ref 14–18)
IMM GRANULOCYTES # BLD AUTO: 0.11 THOU/MM3 (ref 0–0.07)
IMM GRANULOCYTES NFR BLD AUTO: 0.8 %
LYMPHOCYTES ABSOLUTE: 1.2 THOU/MM3 (ref 1–4.8)
LYMPHOCYTES NFR BLD AUTO: 8.5 %
MAGNESIUM SERPL-MCNC: 2.1 MG/DL (ref 1.6–2.4)
MCH RBC QN AUTO: 26.9 PG (ref 26–33)
MCHC RBC AUTO-ENTMCNC: 32.3 GM/DL (ref 32.2–35.5)
MCV RBC AUTO: 83.3 FL (ref 80–94)
MONOCYTES ABSOLUTE: 0.8 THOU/MM3 (ref 0.4–1.3)
MONOCYTES NFR BLD AUTO: 5.4 %
NEUTROPHILS ABSOLUTE: 12.2 THOU/MM3 (ref 1.8–7.7)
NEUTROPHILS NFR BLD AUTO: 85.1 %
NRBC BLD AUTO-RTO: 0 /100 WBC
PHOSPHATE SERPL-MCNC: 2.6 MG/DL (ref 2.4–4.7)
PLATELET # BLD AUTO: 393 THOU/MM3 (ref 130–400)
PMV BLD AUTO: 9.5 FL (ref 9.4–12.4)
POTASSIUM SERPL-SCNC: 4.3 MEQ/L (ref 3.5–5.2)
PROT SERPL-MCNC: 5.9 G/DL (ref 6.1–8)
RBC # BLD AUTO: 3.83 MILL/MM3 (ref 4.7–6.1)
SODIUM SERPL-SCNC: 140 MEQ/L (ref 135–145)
VANCOMYCIN SERPL-MCNC: 14.6 UG/ML (ref 0.1–39.9)
WBC # BLD AUTO: 14.3 THOU/MM3 (ref 4.8–10.8)

## 2024-11-29 PROCEDURE — 36415 COLL VENOUS BLD VENIPUNCTURE: CPT

## 2024-11-29 PROCEDURE — 84100 ASSAY OF PHOSPHORUS: CPT

## 2024-11-29 PROCEDURE — 6370000000 HC RX 637 (ALT 250 FOR IP): Performed by: PHYSICIAN ASSISTANT

## 2024-11-29 PROCEDURE — 99233 SBSQ HOSP IP/OBS HIGH 50: CPT | Performed by: STUDENT IN AN ORGANIZED HEALTH CARE EDUCATION/TRAINING PROGRAM

## 2024-11-29 PROCEDURE — 85025 COMPLETE CBC W/AUTO DIFF WBC: CPT

## 2024-11-29 PROCEDURE — 2580000003 HC RX 258

## 2024-11-29 PROCEDURE — 2060000000 HC ICU INTERMEDIATE R&B

## 2024-11-29 PROCEDURE — 80053 COMPREHEN METABOLIC PANEL: CPT

## 2024-11-29 PROCEDURE — 97166 OT EVAL MOD COMPLEX 45 MIN: CPT

## 2024-11-29 PROCEDURE — 6360000002 HC RX W HCPCS

## 2024-11-29 PROCEDURE — 97530 THERAPEUTIC ACTIVITIES: CPT

## 2024-11-29 PROCEDURE — 97162 PT EVAL MOD COMPLEX 30 MIN: CPT

## 2024-11-29 PROCEDURE — 80202 ASSAY OF VANCOMYCIN: CPT

## 2024-11-29 PROCEDURE — 85014 HEMATOCRIT: CPT

## 2024-11-29 PROCEDURE — 99233 SBSQ HOSP IP/OBS HIGH 50: CPT | Performed by: INTERNAL MEDICINE

## 2024-11-29 PROCEDURE — 85018 HEMOGLOBIN: CPT

## 2024-11-29 PROCEDURE — APPSS60 APP SPLIT SHARED TIME 46-60 MINUTES: Performed by: PHYSICIAN ASSISTANT

## 2024-11-29 PROCEDURE — 83735 ASSAY OF MAGNESIUM: CPT

## 2024-11-29 PROCEDURE — 2580000003 HC RX 258: Performed by: PHYSICIAN ASSISTANT

## 2024-11-29 PROCEDURE — 6370000000 HC RX 637 (ALT 250 FOR IP): Performed by: INTERNAL MEDICINE

## 2024-11-29 PROCEDURE — 6370000000 HC RX 637 (ALT 250 FOR IP)

## 2024-11-29 PROCEDURE — 6360000002 HC RX W HCPCS: Performed by: PHYSICIAN ASSISTANT

## 2024-11-29 RX ORDER — SENNOSIDES A AND B 8.6 MG/1
1 TABLET, FILM COATED ORAL DAILY
Status: DISCONTINUED | OUTPATIENT
Start: 2024-11-29 | End: 2024-11-30

## 2024-11-29 RX ORDER — OXYCODONE HYDROCHLORIDE 5 MG/1
5 TABLET ORAL EVERY 4 HOURS PRN
Status: DISCONTINUED | OUTPATIENT
Start: 2024-11-29 | End: 2024-12-01

## 2024-11-29 RX ORDER — LIDOCAINE 4 G/G
1 PATCH TOPICAL DAILY
Status: DISCONTINUED | OUTPATIENT
Start: 2024-11-29 | End: 2024-12-03 | Stop reason: HOSPADM

## 2024-11-29 RX ORDER — OXYCODONE HYDROCHLORIDE 5 MG/1
10 TABLET ORAL EVERY 4 HOURS PRN
Status: DISCONTINUED | OUTPATIENT
Start: 2024-11-29 | End: 2024-12-01

## 2024-11-29 RX ORDER — POLYETHYLENE GLYCOL 3350 17 G/17G
17 POWDER, FOR SOLUTION ORAL DAILY
Status: DISCONTINUED | OUTPATIENT
Start: 2024-11-29 | End: 2024-12-03 | Stop reason: HOSPADM

## 2024-11-29 RX ADMIN — PANTOPRAZOLE SODIUM 40 MG: 40 TABLET, DELAYED RELEASE ORAL at 05:01

## 2024-11-29 RX ADMIN — ENOXAPARIN SODIUM 30 MG: 100 INJECTION SUBCUTANEOUS at 19:48

## 2024-11-29 RX ADMIN — CYCLOBENZAPRINE 10 MG: 10 TABLET, FILM COATED ORAL at 15:14

## 2024-11-29 RX ADMIN — HYDROCODONE BITARTRATE AND ACETAMINOPHEN 2 TABLET: 5; 325 TABLET ORAL at 08:37

## 2024-11-29 RX ADMIN — OXYCODONE 10 MG: 5 TABLET ORAL at 16:56

## 2024-11-29 RX ADMIN — DEXAMETHASONE SODIUM PHOSPHATE 4 MG: 4 INJECTION, SOLUTION INTRA-ARTICULAR; INTRALESIONAL; INTRAMUSCULAR; INTRAVENOUS; SOFT TISSUE at 08:37

## 2024-11-29 RX ADMIN — Medication 1750 MG: at 06:12

## 2024-11-29 RX ADMIN — SODIUM CHLORIDE, PRESERVATIVE FREE 10 ML: 5 INJECTION INTRAVENOUS at 19:48

## 2024-11-29 RX ADMIN — SENNOSIDES 8.6 MG: 8.6 TABLET, FILM COATED ORAL at 12:01

## 2024-11-29 RX ADMIN — DEXAMETHASONE SODIUM PHOSPHATE 4 MG: 4 INJECTION, SOLUTION INTRA-ARTICULAR; INTRALESIONAL; INTRAMUSCULAR; INTRAVENOUS; SOFT TISSUE at 05:01

## 2024-11-29 RX ADMIN — CEFEPIME 2000 MG: 2 INJECTION, POWDER, FOR SOLUTION INTRAVENOUS at 08:44

## 2024-11-29 RX ADMIN — MORPHINE SULFATE 4 MG: 4 INJECTION, SOLUTION INTRAMUSCULAR; INTRAVENOUS at 03:31

## 2024-11-29 RX ADMIN — ENOXAPARIN SODIUM 30 MG: 100 INJECTION SUBCUTANEOUS at 08:37

## 2024-11-29 RX ADMIN — CEFEPIME 2000 MG: 2 INJECTION, POWDER, FOR SOLUTION INTRAVENOUS at 16:55

## 2024-11-29 RX ADMIN — OXYCODONE 10 MG: 5 TABLET ORAL at 20:59

## 2024-11-29 RX ADMIN — MORPHINE SULFATE 4 MG: 4 INJECTION, SOLUTION INTRAMUSCULAR; INTRAVENOUS at 06:35

## 2024-11-29 RX ADMIN — CEFEPIME 2000 MG: 2 INJECTION, POWDER, FOR SOLUTION INTRAVENOUS at 03:29

## 2024-11-29 RX ADMIN — OXYCODONE 10 MG: 5 TABLET ORAL at 12:01

## 2024-11-29 ASSESSMENT — PAIN DESCRIPTION - ORIENTATION
ORIENTATION: LOWER
ORIENTATION: MID
ORIENTATION: LOWER

## 2024-11-29 ASSESSMENT — PAIN DESCRIPTION - LOCATION
LOCATION: BACK

## 2024-11-29 ASSESSMENT — PAIN - FUNCTIONAL ASSESSMENT
PAIN_FUNCTIONAL_ASSESSMENT: ACTIVITIES ARE NOT PREVENTED
PAIN_FUNCTIONAL_ASSESSMENT: PREVENTS OR INTERFERES WITH MANY ACTIVE NOT PASSIVE ACTIVITIES
PAIN_FUNCTIONAL_ASSESSMENT: ACTIVITIES ARE NOT PREVENTED
PAIN_FUNCTIONAL_ASSESSMENT: ACTIVITIES ARE NOT PREVENTED

## 2024-11-29 ASSESSMENT — PAIN SCALES - GENERAL
PAINLEVEL_OUTOF10: 8
PAINLEVEL_OUTOF10: 8
PAINLEVEL_OUTOF10: 4
PAINLEVEL_OUTOF10: 2
PAINLEVEL_OUTOF10: 6
PAINLEVEL_OUTOF10: 7
PAINLEVEL_OUTOF10: 8

## 2024-11-29 ASSESSMENT — PATIENT HEALTH QUESTIONNAIRE - PHQ9
SUM OF ALL RESPONSES TO PHQ QUESTIONS 1-9: 0
SUM OF ALL RESPONSES TO PHQ QUESTIONS 1-9: 0
SUM OF ALL RESPONSES TO PHQ9 QUESTIONS 1 & 2: 0
1. LITTLE INTEREST OR PLEASURE IN DOING THINGS: NOT AT ALL
SUM OF ALL RESPONSES TO PHQ QUESTIONS 1-9: 0
SUM OF ALL RESPONSES TO PHQ QUESTIONS 1-9: 0
2. FEELING DOWN, DEPRESSED OR HOPELESS: NOT AT ALL

## 2024-11-29 ASSESSMENT — PAIN DESCRIPTION - DESCRIPTORS
DESCRIPTORS: ACHING
DESCRIPTORS: SQUEEZING;STABBING
DESCRIPTORS: SHARP;SHOOTING
DESCRIPTORS: ACHING;SORE
DESCRIPTORS: STABBING
DESCRIPTORS: SPASM

## 2024-11-29 ASSESSMENT — LIFESTYLE VARIABLES
HOW MANY STANDARD DRINKS CONTAINING ALCOHOL DO YOU HAVE ON A TYPICAL DAY: PATIENT DOES NOT DRINK
HOW OFTEN DO YOU HAVE A DRINK CONTAINING ALCOHOL: NEVER

## 2024-11-29 NOTE — CONSULTS
Brief Intervention and Referral to Treatment Summary     Patient was provided PHQ-9, AUDIT-C and DAST Screening:      PHQ-9 Score:  0  AUDIT-C Score:  0  DAST Score:   4    Patient’s substance use is considered:    Harmful    Patient’s depression is considered:    N/a    Brief Education Was Provided     Patient was receptive    Brief Intervention Is Provided (Only for AUDIT-C or DAST)     Patient reports readiness to decrease and/or stop use and a plan was discussed       Recommendations/Referrals for Brief and/or Specialized Treatment Provided to Patient       Completed consult with pt. Pt reports previous services with [facility]. Is not currently active. When asked if he would follow-up there, pt states \"I am not sure yet.\" Pt did accept resource packet.

## 2024-11-29 NOTE — CARE COORDINATION
11/29/24, 2:21 PM EST    DISCHARGE ON GOING EVALUATION    Jonathan [facility]ACHUCA [facility] day: 5  Location: 4A-11/011-A Reason for admit: Sepsis (HCC) [A41.9]  Discitis, unspecified spinal region [M46.40]     Procedures:   11/27 OR: Reexploration of L3-S1 Fusion Extension to L2, L2-L3 Disc Space Debridement     11/26 ECHO     Imaging since last note:   11/29 XR Lumbar Spine Postop appearance lumbar spine     Barriers to Discharge: Spine swab (+) Serratia marcescens, WBC 14.3, diabetes management,pain and nausea control, PT/OT, IV Maxipime, Lovenox, Protonix.     PCP: Lan Davenport MD  Readmission Risk Score: 13.6    Patient Goals/Plan/Treatment Preferences: From home. Planning to start precert for [facility].

## 2024-11-29 NOTE — PLAN OF CARE
Problem: Discharge Planning  Goal: Discharge to home or other facility with appropriate resources  Outcome: Progressing  Flowsheets (Taken 11/29/2024 1535)  Discharge to home or other facility with appropriate resources: Arrange for needed discharge resources and transportation as appropriate     Problem: Pain  Goal: Verbalizes/displays adequate comfort level or baseline comfort level  Outcome: Progressing     Problem: Safety - Adult  Goal: Free from fall injury  Outcome: Progressing     Problem: ABCDS Injury Assessment  Goal: Absence of physical injury  Outcome: Progressing     Problem: Risk for Elopement  Goal: Patient will not exit the unit/facility without proper excort  Outcome: Progressing  Flowsheets (Taken 11/29/2024 1535)  Nursing Interventions for Elopement Risk:   Communicate/escalate to nursing supervisor the risk of elopement   Communicate/escalate to charge nurse the risk of elopement   Care plan reviewed with patient. Patient verbalizes understanding of the plan of care and contributed to goal setting.

## 2024-11-29 NOTE — PROGRESS NOTES
Neurosurgery Progress Note    Patient:  Jonathan Goldsmith      Unit/Bed:4A-11/011-A    YOB: 1975    MRN: [id number]     Acct: [id number]     Admit date: 11/24/2024    Chief Complaint   Patient presents with    Back Pain       Patient Seen, Chart, Physician notes, Labs, Radiology studies reviewed.    Subjective: Patient is seen and evaluated on the floor postoperative day 2 from reexploration revision of posterior lumbar instrumented fusion for I&D and washout with debridement of L2-3 disc space and extension of the fusion to the pelvis and lumbar 3.  Patient is resting comfortably this morning with pain very well-controlled        Past, Family, Social History unchanged from admission.    Diet:  ADULT DIET; Regular    Medications:  Scheduled Meds:   senna  1 tablet Oral Daily    polyethylene glycol  17 g Oral Daily    lidocaine  1 patch TransDERmal Daily    pantoprazole  40 mg Oral QAM AC    sodium chloride flush  5-40 mL IntraVENous 2 times per day    enoxaparin  30 mg SubCUTAneous BID    cefepime  2,000 mg IntraVENous Q8H    sodium chloride flush  5-40 mL IntraVENous 2 times per day     Continuous Infusions:   dextrose      sodium chloride Stopped (11/29/24 0755)    sodium chloride 20 mL/hr at 11/28/24 0828     PRN Meds:oxyCODONE **OR** oxyCODONE, glucose, dextrose bolus **OR** dextrose bolus, glucagon (rDNA), dextrose, acetaminophen, sodium chloride flush, sodium chloride, cyclobenzaprine, ondansetron, sodium chloride flush, sodium chloride    Objective: Patient was observed lying in bed with the head of the bed appropriately elevated and pain very well-controlled.  Dressings were changed over a flat dry incision following removal of the surgical drain performed at bedside this afternoon, without incident or complication.  He is otherwise neuro intact with no additional acute changes noted overnight.    Vitals: /76   Pulse 80   Temp 98.5 °F (36.9 °C) (Oral)   Resp 18   Ht 1.88 m (6' 2\")   Wt  Paravertebral soft tissue are normal.     Impression: No acute thoracic abnormality. This document has been electronically signed by: Brenden Shi MD on 11/24/2024 09:14 PM All CTs at this facility use dose modulation techniques and iterative reconstructions, and/or weight-based dosing when appropriate to reduce radiation to a low as reasonably achievable.                   Assessment: Postoperative day 2 from reexploration and revision of posterior lumbar instrumented fusion for ID washout and displaced debridement at L2-3 with extension of the fusion to the pelvis into L3    Principal Problem:    Sepsis (HCC)  Active Problems:    Discitis    Intravenous drug abuse (HCC)    Psoas abscess (HCC)    History of lumbar laminectomy    Gram-negative bacteremia  Resolved Problems:    * No resolved hospital problems. *        Plan: Patient was seen and evaluated on the floor with evaluation exam findings reviewed discussed with Dr. Doherty/neurosurgeon and the nursing. Postoperative day 2 from reexploration and revision of posterior lumbar instrumented fusion for ID washout and displaced debridement at L2-3 with extension of the fusion to the pelvis into L3    Principal Problem:    Sepsis (HCC)  Active Problems:    Discitis    Intravenous drug abuse (HCC)    Psoas abscess (HCC)    History of lumbar laminectomy    Gram-negative bacteremia  Resolved Problems:    * No resolved hospital problems. *        Plan: Postoperative day 2 from reexploration and revision of posterior lumbar instrumented fusion for ID washout and disc space debridement at L2-3 with extension of the fusion to the pelvis into L3    Principal Problem:    Sepsis (HCC)  Active Problems:    Discitis    Intravenous drug abuse (HCC)    Psoas abscess (HCC)    History of lumbar laminectomy    Gram-negative bacteremia  Resolved Problems:    * No resolved hospital problems. *        Plan: Patient was seen and evaluated on the floor with evaluation exam findings

## 2024-11-29 NOTE — PROGRESS NOTES
[facility]   PROGRESS NOTE      Patient: Jonathan Goldsmith  Room #: 4A-11/011-A            YOB: 1975  Age: 49 y.o.  Gender: male            Admit Date & Time: 11/24/2024  7:11 PM    Assessment:    The patient declined a visit today.    Interventions:  The patient was provided information about Spiritual Care being available.     Outcomes:  The  wished the patient a positive day.     Plan:  1.Spiritual care will continue to follow the patient according to [facility] spiritual care SOP.       Electronically signed by Chaplain Rayna, on 11/29/2024 at 11:01 AM.  Spiritual Care Department  [facility]  [phone]

## 2024-11-29 NOTE — PROGRESS NOTES
06:11 AM    K 4.4 11/24/2024 10:29 PM     11/29/2024 06:11 AM    CO2 23 11/29/2024 06:11 AM    PHOS 2.6 11/29/2024 06:11 AM    BUN 16 11/29/2024 06:11 AM    CREATININE 0.6 11/29/2024 06:11 AM     PT/INR:   Lab Results   Component Value Date/Time    INR 1.15 06/29/2017 05:15 PM     Prealbumin:   Lab Results   Component Value Date/Time    PREALBUMIN 15.3 07/25/2024 06:01 AM     Albumin:No results found for: \"LABALBU\"  Sed Rate:  Lab Results   Component Value Date/Time    SEDRATE 29 11/24/2024 07:55 PM     CRP:   Lab Results   Component Value Date/Time    CRP 22.79 11/27/2024 08:35 PM     Micro:   Lab Results   Component Value Date/Time    BC No growth 24 hours. 11/24/2024 07:57 PM    BC  11/24/2024 07:57 PM     sensitivity done-previous specimen Refer to blood culture collected 11/24/2024 at 1955 for sensitivity results.      Hemoglobin A1C: No results found for: \"LABA1C\"    MRI LUMBAR SPINE W WO CONTRAST    Result Date: 11/25/2024  PROCEDURE: MRI LUMBAR SPINE W WO CONTRAST CLINICAL INFORMATION: Back pain r/o osteomyelitis. Severe lower back pain with pain down the left leg. History of osteomyelitis and prior surgery. COMPARISON: Lumbar spine MRI 8/22/2024 and 7/18/2024. TECHNIQUE: Sagittal and axial T1 and T2-weighted images were obtained to the lumbar spine. Postcontrast axial and sagittal T1-weighted images were also obtained. FINDINGS: The patient has developed a ventral epidural abscess at the L2-3 level since the prior exams. This is at the disc level and extends over a length of 1.5 cm. In the axial plane, this measures 9 x 9 mm. There is central nonenhancement and peripheral enhancement. This has developed since the prior exam. This is best seen on sagittal image 8 of the postcontrast images. This is seen on axial postcontrast images 12 and 13. There is progressive abnormal signal in the L2 and L3 vertebral bodies consistent with ongoing discitis/osteomyelitis. There is diffuse abnormal signal on the  no spinal canal stenosis. There is some edema in both psoas muscles. There is no foraminal stenosis. At L5-S1, there has been fusion and posterior decompression. There is no spinal canal stenosis. There is mild bilateral foraminal stenosis. There is no abnormal enhancement. There are no suspicious findings in the visualized aspects of the retroperitoneum and paraspinal soft tissues.     1. Interval development of a ventral epidural abscess measuring 1.5 cm posterior to the disc at the L2-3 level which is causing worsening spinal canal stenosis. This is now moderate-severe. 2. Interval development of an abscess within the left psoas muscle. 3. Worsening discitis/osteomyelitis at the L2-3 level. 4. Diffuse edema and abnormal signal in both psoas muscles consistent with myositis. These findings were relayed via [employer] to Dr. Kaminski at 2:32 p.m. on 11/25/2024. **This report has been created using voice recognition software. It may contain minor errors which are inherent in voice recognition technology.** Electronically signed by Dr. Scarlet Linder    CT LUMBAR RECONSTRUCTION WO POST PROCESS    Result Date: 11/24/2024  CT lumbar Spine Without Contrast Indication: Back pain. History of spinal osteomyelitis. Technique: CT lumbar spine without intravenous contrast. Coronal and sagittal reformations were performed. Comparison: CT lumbar spine 7/27/24. MRI lumbar spine 8/22/24. Findings: L2 inferior and L3 superior endplate erosions with endplate sclerosis, new since 7/27/24 and increased since 08/22/2024. Chronic endplate osteophytosis at L2-L3. No paravertebral soft tissue swelling or fluid collection. Posterior fusion L3-S1 along with L3-L5 posterior decompression and post surgical changes L5-S1 disc space. Bony fusion L3-L4 vertebral bodies. Minor anterolisthesis L3 on L4. Findings similar to prior study. No fluid collection in the posterior paraspinous soft tissue. Multilevel degenerative disc disease from T11-T12  this facility use dose modulation techniques and iterative reconstructions, and/or weight-based dosing when appropriate to reduce radiation to a low as reasonably achievable.    CT ABDOMEN PELVIS W IV CONTRAST Additional Contrast? None    Result Date: 11/24/2024  CT abdomen and pelvis with contrast Indication: Back pain. History of spinal osteomyelitis. Technique: CT abdomen and pelvis with intravenous contrast. Coronal and sagittal reformations. Comparison: MRI lumbar spine 08/22/2024. CT lumbar spine 07/22/2024. CT abdomen/pelvis 9/27/2007. Findings: Partially imaged lung bases: No pleural effusion or focal consolidation. Calcified lung granulomata. Abdomen/pelvis: Calcified hepatic and splenic granulomata. Enlarged spleen up to 15.2 cm. Liver and spleen are otherwise normal. Prior cholecystectomy, new since 2007. Pancreas and adrenal glands are normal. The kidneys enhance symmetrically. No hydronephrosis bilaterally. The large and small bowel is nondilated. Cecum located in the midline, suggesting mobile cecum. Normal appendix. Moderate amount of stool in the colon No bulky abdominal or retroperitoneal lymphadenopathy. The abdominal aorta is normal in caliber. Urinary bladder is partially distended with a normal contour. Mildly enlarged prostate Fat containing umbilical and left inguinal hernias. Bones: Spondylosis posterior fusion L3-S1 along with multilevel posterior decompression. Chronic inferior L2 and superior L3 endplate irregularities with sclerosis and osteophytosis mainly new since 07/27/2024 and increased since 08/22/2024. No paravertebral soft tissue swelling or fluid collection L2-L3.     Impression: 1. Chronic L2-L3 spondylodiscitis. No paravertebral soft tissue swelling or fluid collection L2-L3 to suggest acute component. 2. No acute intra-abdominal abnormality. 3. Mild splenomegaly. This document has been electronically signed by: Brenden Shi MD on 11/24/2024 09:32 PM All CTs at this facility use

## 2024-11-29 NOTE — PROGRESS NOTES
Hospitalist Progress Note      Patient:  Jonathan Goldsmith    Unit/Bed:4A-11/011-A  YOB: 1975  MRN: [id number]   Acct: [id number]   PCP: Lan Davenport MD  Date of Admission: 11/24/2024    Assessment/Plan:    Sepsis secondary to serratia marcescens bacteremia  1.5cm ventral spine epidural abscess  Left psoas muscle abscess  Chronic L2-L3 discitis with associated severe bilateral neural foraminal stenosis  S/p reexploration L3-S1 with fusion extension to L2. L2-3 disc space debridement (11/27/24)  Hx of multiple admission for spinal osteomyelitis (most recently 08/2024), lumbar surgery complicated by epidural abscess (2017) who presented on 11/24/2024 for intractable back pain.  Onset of symptoms 3 days prior with back pain, subjective fever, chills, \"burning\" sensation in left leg.  His most recent inpatient stay was in 08/20/2024 for osteomyelitis with discharge to [place] for extended IV antibiotics; however, he left A without completing antibiotics. Antibiotic course during admission: vancomycin (11/24-present), ceftriaxone (11/24-11/25), cefepime (11/25-present).    Currently hemodynamically stable, afebrile. No leukocytosis. Has some postoperative pain  ID and neurosurgery consulted, appreciate recommendations  Discussed case with neurosurgery.  Will discontinue DXM at this time  Currently on cefepime in setting of bacteremia, plan for at least 6 weeks of therapy. High risk of elopement. Hesitant to place PICC. Plan for discharge to [facility] to complete abx.   If IV cefepime is not feasible, then will need to do 6 weeks of PO cipro  End date 1/8/25  Needs weekly CBC, BMP, ESR, CRP  Needs follow up with neurosurgery 14 days postop for suture removal.     History of polysubstance use disorder   Drug screen positive for methamphetamine and fentanyl. Admits to IVDU, methamphetamines and heroin.   Addition and social work consult  Mildly enlarged prostate Fat containing umbilical and left inguinal hernias. Bones: Spondylosis posterior fusion L3-S1 along with multilevel posterior decompression. Chronic inferior L2 and superior L3 endplate irregularities with sclerosis and osteophytosis mainly new since 07/27/2024 and increased since 08/22/2024. No paravertebral soft tissue swelling or fluid collection L2-L3.     Impression: 1. Chronic L2-L3 spondylodiscitis. No paravertebral soft tissue swelling or fluid collection L2-L3 to suggest acute component. 2. No acute intra-abdominal abnormality. 3. Mild splenomegaly. This document has been electronically signed by: Brenden Shi MD on 11/24/2024 09:32 PM All CTs at this facility use dose modulation techniques and iterative reconstructions, and/or weight-based dosing when appropriate to reduce radiation to a low as reasonably achievable.    CT CHEST W CONTRAST    Result Date: 11/24/2024  CT Chest with contrast Indication: Back pain. History of spinal osteomyelitis. Technique: CT chest with intravenous contrast. Coronal and sagittal reformations. Comparison: None Findings: Minor atelectasis. No pleural effusion, or focal consolidation. Old granulomatous disease with calcified mediastinal and hilar lymph nodes and calcified lung granulomata. No bulky mediastinal, hilar, or axillary lymphadenopathy. The heart is normal in size. No moderate or large pericardial effusion. The thoracic aorta is normal in caliber. Partially imaged upper abdomen: No upper abdominal ascites. Calcified hepatic and splenic granulomata. Prior cholecystectomy. Bones: No suspicious osseous lesions. Spondylosis. T5 hemangioma. Chronic T7 anterior wedging deformity. Paravertebral soft tissue are normal.     Impression: No acute thoracic abnormality. This document has been electronically signed by: Brenden Shi MD on 11/24/2024 09:14 PM All CTs at this facility use dose modulation techniques and iterative reconstructions, and/or

## 2024-11-29 NOTE — DISCHARGE INSTRUCTIONS
Neurosurgery recommends discharge planning with follow-ups with infectious disease, as indicated, and with neurosurgery at 14 days postoperatively for suture removal.

## 2024-11-29 NOTE — PROGRESS NOTES
[facility]  INPATIENT OCCUPATIONAL THERAPY  [facility] 4A  EVALUATION      Discharge Recommendations: Continue to assess pending progress, LTACH, Subacute/Skilled Nursing Facility  Equipment Recommendations: Yes Monitor for AD, shower chair, LHAE pending discharge disposition      Time In: 1018  Time Out: 1042  Timed Code Treatment Minutes: 16 Minutes  Minutes: 24          Date: 2024  Patient Name: Jonathan Goldsmith,   Gender: male      MRN: [id number]  : 1975  (49 y.o.)  Referring Practitioner: Roberto Michaels PA-C  Diagnosis: Sepsis (HCC)  Additional Pertinent Hx: Per MD H&P: Jonathan Goldsmith is a 49 y.o. male with PMHx of osteomyelitis and IV drug abuse who presented to [facility] with chief complaint of back pain. The patient has had several admissions for osteomyelitis, most recently in 2024.  He was treated inpatient then discharged to [place] for extended IV antibiotic therapy and ultimately left AMA before completing his course.  He tells me that this was due to his insurance running out.   He reports that his symptoms began 3 days ago.  He developed severe lower back pain and spasms.  He states he had a 101 fever at home.  He has chills.  He is unable to walk and actually fell at home. He reports lying there and not being able to get up for 3 hours.  He denies hitting his head and no LOC.  He denies any loss of bowel or bladder. He does report a burning sensation down his left leg.   ED course:  Found to be tachycardic with leukocytosis.  BC sent.  CT lumbar spine shows chronic L2-L3 discitis and severe bilateral L2-L3 neural foramina stenosis that has increased since 24.  He was treated with IV Fentanyl and given Rocephin and Vancomycin. 24: Dr. Doherty, Reexploration of L3-L4, L4-L5, L5-S1 Fusion Extension to L2, L2-L3 Disc Space Debridement    Restrictions/Precautions:  Restrictions/Precautions: General Precautions, Fall Risk  Position Activity  Minimal Assistance, X 1 Pt would not allow therapist to assist d/t pain- Gladis at BLE guiding to seated position for optimal posture EOB.  Sit to Supine: Moderate Assistance, X 1 Raising BLE onto bed.  Scooting: Moderate Assistance, X 1 EOB    TRANSFERS:  Transfers Not Tested at this time secondary to: Increased pain and pt declining standing trial at time of eval . OTR to further assess.    FUNCTIONAL MOBILITY:  OTR to further assess.    AM-[facility] Inpatient Daily Activity Raw Score: 14  AM-[facility] Inpatient ADL T-Scale Score : 33.39  ADL Inpatient CMS 0-100% Score: 59.67    Activity Tolerance:  Patient tolerance of  treatment: Fair treatment tolerance and Limited by pain      Modified [place]rose:  Premorbid Functional Status: Not Applicable  Current Functional Status:  Not Applicable    Assessment:  Assessment: Pt presents s/p reexploration of L3-L4, L4-L5, L5-S1 Fusion Extension to L2, L2-L3 Disc Space Debridement. He demonstrates decreased ADL/IADL performance, decreased transfers/functional mobility, activity tolerance, and strength. Pt requires skilled OT services to address these deficits in order to facilitate safe discharge planning and (I) independence in basic self care tasks.  Performance deficits / Impairments: Decreased functional mobility , Decreased ADL status, Decreased ROM, Decreased strength, Decreased safe awareness, Decreased endurance, Decreased balance  Prognosis: Fair  REQUIRES OT FOLLOW-UP: Yes  Decision Making: Medium Complexity    Treatment Initiated: Treatment and education initiated within context of evaluation.  Evaluation time included review of current medical information, gathering information related to past medical, social and functional history, completion of standardized testing, formal and informal observation of tasks, assessment of data and development of plan of care and goals.  Treatment time included skilled education and facilitation of tasks to increase safety and independence with

## 2024-11-29 NOTE — PROGRESS NOTES
[facility]  INPATIENT PHYSICAL THERAPY  EVALUATION  [facility] 4A - 4A-11/011-A    Discharge Recommendations: Therapy recommended at discharge  Equipment Recommendations:    Pend pt progress            Time In: 745  Time Out: 08  Timed Code Treatment Minutes: 16 Minutes  Minutes: 24          Date: 2024  Patient Name: Jonathan Goldsmith,  Gender:  male        MRN: [id number]  : 1975  (49 y.o.)      Referring Practitioner: Roberto Michaels PA-C  Diagnosis: Sepsis (HCC)  Additional Pertinent Hx: 49 y.o. male with PMHx of osteomyelitis and IV drug abuse who presented to [facility] with chief complaint of back pain. The patient has had several admissions for osteomyelitis, most recently in 2024.  He was treated inpatient then discharged to [place] for extended IV antibiotic therapy and ultimately left AMA before completing his course.  He tells me that this was due to his insurance running out.   He reports that his symptoms began 3 days ago.  He developed severe lower back pain and spasms.  He states he had a 101 fever at home.  He has chills.  He is unable to walk and actually fell at home. He reports lying there and not being able to get up for 3 hours.  He denies hitting his head and no LOC.  He denies any loss of bowel or bladder. He does report a burning sensation down his left leg.2024 12:22 PM   Reexploration of L3-L4, L4-L5, L5-S1 Fusion Extension to L2, L2-L3 Disc Space Debridement  Deb Doherty MD     Restrictions/Precautions:  Restrictions/Precautions: General Precautions, Fall Risk       Spinal Precautions: No Bending, No Lifting, No Twisting    Required Braces or Orthoses?: No      Subjective:  Chart Reviewed: Yes  Patient assessed for rehabilitation services?: Yes  Subjective: RN apprvoed session. Pt agreeable to treatment.    General:  Orientation Level: Oriented to place, Oriented to person, Oriented to situation  Vision: Within Functional Limits  Hearing:  of evaluation.  Evaluation time included review of current medical information, gathering information related to past medical, social and functional history, completion of standardized testing, formal and informal observation of tasks, assessment of data and development of plan of care and goals.  Treatment time included skilled education and facilitation of tasks to increase safety and independence with functional mobility for improved independence and quality of life.    Assessment:  Body Structures, Functions, Activity Limitations Requiring Skilled Therapeutic Intervention: Decreased functional mobility , Decreased body mechanics, Decreased strength, Decreased endurance, Decreased balance, Increased pain  Assessment: 49 y.o. male post lumbar surgery presents with decreased functional mobility, body mechanics, strength, endurance, balance, and increased pain.  Therapy Prognosis: Good    Requires PT Follow-Up: Yes    Patient Education:      .    Patient Education  Education Given To: Patient  Education Provided: Role of Therapy, Plan of Care  Education Method: Verbal  Barriers to Learning: None  Education Outcome: Verbalized understanding       Plan:  Current Treatment Recommendations: Strengthening, Balance training, Functional mobility training, Transfer training, Endurance training, Gait training, Stair training, Neuromuscular re-education, Safety education & training, Therapeutic activities  General Plan:  (5x O)    Goals:  Patient Goals : none stated  Short Term Goals  Time Frame for Short Term Goals: by discharge  Short Term Goal 1: Pt to perform bed mobility independently for safe return home.  Short Term Goal 2: Pt to tolerate sitting edge of bed for 20 minutes for increased core strength.  Short Term Goal 3: PT to assess transfers an gait.  Long Term Goals  Time Frame for Long Term Goals : NA due to short ELOS.    Following session, patient left in safe position with all fall risk precautions in

## 2024-11-30 LAB
ALBUMIN SERPL BCG-MCNC: 2.4 G/DL (ref 3.5–5.1)
ALP SERPL-CCNC: 63 U/L (ref 38–126)
ALT SERPL W/O P-5'-P-CCNC: 15 U/L (ref 11–66)
ANION GAP SERPL CALC-SCNC: 10 MEQ/L (ref 8–16)
AST SERPL-CCNC: 21 U/L (ref 5–40)
BILIRUB SERPL-MCNC: < 0.2 MG/DL (ref 0.3–1.2)
BUN SERPL-MCNC: 17 MG/DL (ref 7–22)
CALCIUM SERPL-MCNC: 8 MG/DL (ref 8.5–10.5)
CHLORIDE SERPL-SCNC: 105 MEQ/L (ref 98–111)
CO2 SERPL-SCNC: 23 MEQ/L (ref 23–33)
CREAT SERPL-MCNC: 0.6 MG/DL (ref 0.4–1.2)
DEPRECATED RDW RBC AUTO: 42.9 FL (ref 35–45)
ERYTHROCYTE [DISTWIDTH] IN BLOOD BY AUTOMATED COUNT: 13.8 % (ref 11.5–14.5)
GFR SERPL CREATININE-BSD FRML MDRD: > 90 ML/MIN/1.73M2
GLUCOSE SERPL-MCNC: 87 MG/DL (ref 70–108)
HCT VFR BLD AUTO: 30.8 % (ref 42–52)
HGB BLD-MCNC: 9.6 GM/DL (ref 14–18)
MAGNESIUM SERPL-MCNC: 1.9 MG/DL (ref 1.6–2.4)
MCH RBC QN AUTO: 26.6 PG (ref 26–33)
MCHC RBC AUTO-ENTMCNC: 31.2 GM/DL (ref 32.2–35.5)
MCV RBC AUTO: 85.3 FL (ref 80–94)
PHOSPHATE SERPL-MCNC: 2.8 MG/DL (ref 2.4–4.7)
PLATELET # BLD AUTO: 349 THOU/MM3 (ref 130–400)
PMV BLD AUTO: 9.2 FL (ref 9.4–12.4)
POTASSIUM SERPL-SCNC: 3.8 MEQ/L (ref 3.5–5.2)
PROT SERPL-MCNC: 5.5 G/DL (ref 6.1–8)
RBC # BLD AUTO: 3.61 MILL/MM3 (ref 4.7–6.1)
SODIUM SERPL-SCNC: 138 MEQ/L (ref 135–145)
WBC # BLD AUTO: 11.3 THOU/MM3 (ref 4.8–10.8)

## 2024-11-30 PROCEDURE — 36415 COLL VENOUS BLD VENIPUNCTURE: CPT

## 2024-11-30 PROCEDURE — 2580000003 HC RX 258: Performed by: PHYSICIAN ASSISTANT

## 2024-11-30 PROCEDURE — 80053 COMPREHEN METABOLIC PANEL: CPT

## 2024-11-30 PROCEDURE — 6360000002 HC RX W HCPCS

## 2024-11-30 PROCEDURE — 2580000003 HC RX 258

## 2024-11-30 PROCEDURE — 6370000000 HC RX 637 (ALT 250 FOR IP): Performed by: PHYSICIAN ASSISTANT

## 2024-11-30 PROCEDURE — 99233 SBSQ HOSP IP/OBS HIGH 50: CPT | Performed by: STUDENT IN AN ORGANIZED HEALTH CARE EDUCATION/TRAINING PROGRAM

## 2024-11-30 PROCEDURE — 6370000000 HC RX 637 (ALT 250 FOR IP)

## 2024-11-30 PROCEDURE — 84100 ASSAY OF PHOSPHORUS: CPT

## 2024-11-30 PROCEDURE — 6370000000 HC RX 637 (ALT 250 FOR IP): Performed by: INTERNAL MEDICINE

## 2024-11-30 PROCEDURE — 99232 SBSQ HOSP IP/OBS MODERATE 35: CPT | Performed by: INTERNAL MEDICINE

## 2024-11-30 PROCEDURE — 85027 COMPLETE CBC AUTOMATED: CPT

## 2024-11-30 PROCEDURE — 2060000000 HC ICU INTERMEDIATE R&B

## 2024-11-30 PROCEDURE — 83735 ASSAY OF MAGNESIUM: CPT

## 2024-11-30 RX ORDER — SENNOSIDES A AND B 8.6 MG/1
1 TABLET, FILM COATED ORAL 2 TIMES DAILY
Status: DISCONTINUED | OUTPATIENT
Start: 2024-11-30 | End: 2024-12-03 | Stop reason: HOSPADM

## 2024-11-30 RX ADMIN — OXYCODONE 10 MG: 5 TABLET ORAL at 19:50

## 2024-11-30 RX ADMIN — CYCLOBENZAPRINE 10 MG: 10 TABLET, FILM COATED ORAL at 06:01

## 2024-11-30 RX ADMIN — SENNOSIDES 8.6 MG: 8.6 TABLET, FILM COATED ORAL at 19:50

## 2024-11-30 RX ADMIN — CEFEPIME 2000 MG: 2 INJECTION, POWDER, FOR SOLUTION INTRAVENOUS at 17:47

## 2024-11-30 RX ADMIN — OXYCODONE 10 MG: 5 TABLET ORAL at 10:16

## 2024-11-30 RX ADMIN — CEFEPIME 2000 MG: 2 INJECTION, POWDER, FOR SOLUTION INTRAVENOUS at 01:52

## 2024-11-30 RX ADMIN — PANTOPRAZOLE SODIUM 40 MG: 40 TABLET, DELAYED RELEASE ORAL at 06:01

## 2024-11-30 RX ADMIN — CEFEPIME 2000 MG: 2 INJECTION, POWDER, FOR SOLUTION INTRAVENOUS at 10:19

## 2024-11-30 RX ADMIN — SENNOSIDES 8.6 MG: 8.6 TABLET, FILM COATED ORAL at 10:16

## 2024-11-30 RX ADMIN — SODIUM CHLORIDE, PRESERVATIVE FREE 10 ML: 5 INJECTION INTRAVENOUS at 10:16

## 2024-11-30 RX ADMIN — OXYCODONE 10 MG: 5 TABLET ORAL at 06:01

## 2024-11-30 RX ADMIN — OXYCODONE 10 MG: 5 TABLET ORAL at 23:43

## 2024-11-30 RX ADMIN — OXYCODONE 10 MG: 5 TABLET ORAL at 14:35

## 2024-11-30 RX ADMIN — ENOXAPARIN SODIUM 30 MG: 100 INJECTION SUBCUTANEOUS at 19:50

## 2024-11-30 RX ADMIN — CYCLOBENZAPRINE 10 MG: 10 TABLET, FILM COATED ORAL at 19:50

## 2024-11-30 RX ADMIN — ENOXAPARIN SODIUM 30 MG: 100 INJECTION SUBCUTANEOUS at 10:16

## 2024-11-30 ASSESSMENT — PAIN SCALES - GENERAL
PAINLEVEL_OUTOF10: 10
PAINLEVEL_OUTOF10: 8
PAINLEVEL_OUTOF10: 9
PAINLEVEL_OUTOF10: 3
PAINLEVEL_OUTOF10: 10
PAINLEVEL_OUTOF10: 8

## 2024-11-30 ASSESSMENT — PAIN - FUNCTIONAL ASSESSMENT
PAIN_FUNCTIONAL_ASSESSMENT: PREVENTS OR INTERFERES SOME ACTIVE ACTIVITIES AND ADLS
PAIN_FUNCTIONAL_ASSESSMENT: PREVENTS OR INTERFERES SOME ACTIVE ACTIVITIES AND ADLS

## 2024-11-30 ASSESSMENT — PAIN DESCRIPTION - LOCATION
LOCATION: BACK

## 2024-11-30 ASSESSMENT — PAIN DESCRIPTION - ORIENTATION
ORIENTATION: MID
ORIENTATION: LOWER

## 2024-11-30 ASSESSMENT — PAIN DESCRIPTION - DESCRIPTORS
DESCRIPTORS: ACHING
DESCRIPTORS: SPASM
DESCRIPTORS: ACHING;SORE;DISCOMFORT

## 2024-11-30 NOTE — PLAN OF CARE
Problem: Discharge Planning  Goal: Discharge to home or other facility with appropriate resources  Outcome: Progressing  Flowsheets (Taken 11/30/2024 1010)  Discharge to home or other facility with appropriate resources: Identify discharge learning needs (meds, wound care, etc)     Problem: Pain  Goal: Verbalizes/displays adequate comfort level or baseline comfort level  Outcome: Progressing     Problem: Safety - Adult  Goal: Free from fall injury  Outcome: Progressing     Problem: ABCDS Injury Assessment  Goal: Absence of physical injury  Outcome: Progressing     Problem: Risk for Elopement  Goal: Patient will not exit the unit/facility without proper excort  Outcome: Progressing  Flowsheets (Taken 11/30/2024 1010)  Nursing Interventions for Elopement Risk: Collaborate with treatment team for drug withdrawal symptoms treatment   Care plan reviewed with patient. Patient verbalizes understanding of the plan of care and contributed to goal setting.

## 2024-11-30 NOTE — PROGRESS NOTES
[facility] Infectious Disease         Progress Note      Jonathan Goldsmith  MEDICAL RECORD NUMBER:  [id number]  AGE: 49 y.o.   GENDER: male  : 1975  EPISODE DATE:  2024      Assessment:     Patient is a 49-year-old male who I am consulted for epidural abscess.  Blood cultures positive for Serratia.  I would recommend repeat blood cultures on .  Currently on therapy with  cefepime.     Continue cefepime for Serratia  Status post surgical intervention on  with fusion extension to L2 and L2, L3 disc space debridement    Gram-negative bacilli identified from spinal cultures, c/w Serratia species  In the setting of vertebral osteomyelitis and psoas abscess, I would recommend either continuation of cefepime or transition to ciprofloxacin to complete a 6-week course following surgical intervention    Therapy  Cefepime or ciprofloxacin, pending disposition  Duration for 6 weeks  End of therapy 2024  Recommend weekly CBC, BMP, ESR and CRP    Infectious disease follow-up will be scheduled      Subjective:     Chief Complaint   Patient presents with    Back Pain       Patient was seen at the bedside.  Patient does continue to complain of back pain though he seems significantly more comfortable than prior exams of demonstrated.  He is tolerating pathogen directed antimicrobial therapy.        Patient Active Problem List   Diagnosis Code    Epidural abscess  drained G06.2    Opiate abuse, continuous ([facility]) F11.10    Hypertension I10    Non compliance w medication regimen  left AMA yesterday from 4A Z91.148    Postoperative pain G89.18    Hyponatremia E87.1    Discitis M46.40    Osteomyelitis of spine (HCC)  multilevels  from t11 to l5 M46.20    Pain in left thigh M79.652    Subcutaneous nodules R22.9    Injection site reaction T80.90XA    Tobacco chew use Z72.0    Right leg numbness R20.0    Fall at home W19.XXXA, Y92.009    Microcytic anemia D50.9    Intravenous drug abuse (HCC)  throughout the psoas muscles. At L4-5, there has been fusion. There is no spinal canal stenosis. There is some edema in both psoas muscles. There is no foraminal stenosis. At L5-S1, there has been fusion and posterior decompression. There is no spinal canal stenosis. There is mild bilateral foraminal stenosis. There is no abnormal enhancement. There are no suspicious findings in the visualized aspects of the retroperitoneum and paraspinal soft tissues.     1. Interval development of a ventral epidural abscess measuring 1.5 cm posterior to the disc at the L2-3 level which is causing worsening spinal canal stenosis. This is now moderate-severe. 2. Interval development of an abscess within the left psoas muscle. 3. Worsening discitis/osteomyelitis at the L2-3 level. 4. Diffuse edema and abnormal signal in both psoas muscles consistent with myositis. These findings were relayed via [employer] to Dr. Kaminski at 2:32 p.m. on 11/25/2024. **This report has been created using voice recognition software. It may contain minor errors which are inherent in voice recognition technology.** Electronically signed by Dr. Scarlet Linder    CT LUMBAR RECONSTRUCTION WO POST PROCESS    Result Date: 11/24/2024  CT lumbar Spine Without Contrast Indication: Back pain. History of spinal osteomyelitis. Technique: CT lumbar spine without intravenous contrast. Coronal and sagittal reformations were performed. Comparison: CT lumbar spine 7/27/24. MRI lumbar spine 8/22/24. Findings: L2 inferior and L3 superior endplate erosions with endplate sclerosis, new since 7/27/24 and increased since 08/22/2024. Chronic endplate osteophytosis at L2-L3. No paravertebral soft tissue swelling or fluid collection. Posterior fusion L3-S1 along with L3-L5 posterior decompression and post surgical changes L5-S1 disc space. Bony fusion L3-L4 vertebral bodies. Minor anterolisthesis L3 on L4. Findings similar to prior study. No fluid collection in the posterior

## 2024-11-30 NOTE — PROGRESS NOTES
Hospitalist Progress Note      Patient:  Jonathan Goldsmith    Unit/Bed:4A-11/011-A  YOB: 1975  MRN: [id number]   Acct: [id number]   PCP: Lan Davenport MD  Date of Admission: 11/24/2024    Assessment/Plan:    Sepsis secondary to serratia marcescens bacteremia  1.5cm ventral spine epidural abscess  Left psoas muscle abscess  Chronic L2-L3 discitis with associated severe bilateral neural foraminal stenosis  S/p reexploration L3-S1 with fusion extension to L2. L2-3 disc space debridement (11/27/24)  Hx of multiple admission for spinal osteomyelitis (most recently 08/2024), lumbar surgery complicated by epidural abscess (2017) who presented on 11/24/2024 for intractable back pain.  Onset of symptoms 3 days prior with back pain, subjective fever, chills, \"burning\" sensation in left leg.  His most recent inpatient stay was in 08/20/2024 for osteomyelitis with discharge to [place] for extended IV antibiotics; however, he left A without completing antibiotics. Antibiotic course during admission: vancomycin (11/24-present), ceftriaxone (11/24-11/25), cefepime (11/25-present).    Currently hemodynamically stable, afebrile. downtrending leukocytosis. Has some postoperative pain  ID and neurosurgery consulted, appreciate recommendations  Currently on cefepime in setting of bacteremia, plan for at least 6 weeks of therapy. High risk of elopement. Hesitant to place PICC. [facility]   If IV cefepime is not feasible, then will need to do 6 weeks of PO cipro  End date 1/8/24  Needs weekly CBC, BMP, ESR, CRP  Needs follow up with neurosurgery 14 days postop for suture removal.     History of polysubstance use disorder   Drug screen positive for methamphetamine and fentanyl. Admits to IVDU, methamphetamines and heroin.   Addition and social work consult       LDA: []CVC / []PICC / []Midline / []Marquez / []Drains /

## 2024-12-01 LAB
ALBUMIN SERPL BCG-MCNC: 2.6 G/DL (ref 3.5–5.1)
ALP SERPL-CCNC: 61 U/L (ref 38–126)
ALT SERPL W/O P-5'-P-CCNC: 13 U/L (ref 11–66)
ANION GAP SERPL CALC-SCNC: 10 MEQ/L (ref 8–16)
AST SERPL-CCNC: 16 U/L (ref 5–40)
BACTERIA SPEC AEROBE CULT: ABNORMAL
BACTERIA SPEC ANAEROBE CULT: ABNORMAL
BACTERIA SPEC ANAEROBE CULT: ABNORMAL
BILIRUB SERPL-MCNC: 0.4 MG/DL (ref 0.3–1.2)
BUN SERPL-MCNC: 11 MG/DL (ref 7–22)
CALCIUM SERPL-MCNC: 7.9 MG/DL (ref 8.5–10.5)
CHLORIDE SERPL-SCNC: 100 MEQ/L (ref 98–111)
CO2 SERPL-SCNC: 24 MEQ/L (ref 23–33)
CREAT SERPL-MCNC: 0.6 MG/DL (ref 0.4–1.2)
DEPRECATED RDW RBC AUTO: 43.1 FL (ref 35–45)
ERYTHROCYTE [DISTWIDTH] IN BLOOD BY AUTOMATED COUNT: 14 % (ref 11.5–14.5)
GFR SERPL CREATININE-BSD FRML MDRD: > 90 ML/MIN/1.73M2
GLUCOSE SERPL-MCNC: 86 MG/DL (ref 70–108)
GRAM STN SPEC: ABNORMAL
GRAM STN SPEC: ABNORMAL
HCT VFR BLD AUTO: 33 % (ref 42–52)
HGB BLD-MCNC: 10.3 GM/DL (ref 14–18)
MAGNESIUM SERPL-MCNC: 1.8 MG/DL (ref 1.6–2.4)
MCH RBC QN AUTO: 26.2 PG (ref 26–33)
MCHC RBC AUTO-ENTMCNC: 31.2 GM/DL (ref 32.2–35.5)
MCV RBC AUTO: 84 FL (ref 80–94)
ORGANISM: ABNORMAL
ORGANISM: ABNORMAL
PHOSPHATE SERPL-MCNC: 3.4 MG/DL (ref 2.4–4.7)
PLATELET # BLD AUTO: 308 THOU/MM3 (ref 130–400)
PMV BLD AUTO: 8.9 FL (ref 9.4–12.4)
POTASSIUM SERPL-SCNC: 3.6 MEQ/L (ref 3.5–5.2)
PROT SERPL-MCNC: 5.4 G/DL (ref 6.1–8)
RBC # BLD AUTO: 3.93 MILL/MM3 (ref 4.7–6.1)
SODIUM SERPL-SCNC: 134 MEQ/L (ref 135–145)
WBC # BLD AUTO: 12.9 THOU/MM3 (ref 4.8–10.8)

## 2024-12-01 PROCEDURE — 05HC33Z INSERTION OF INFUSION DEVICE INTO LEFT BASILIC VEIN, PERCUTANEOUS APPROACH: ICD-10-PCS | Performed by: INTERNAL MEDICINE

## 2024-12-01 PROCEDURE — 1200000000 HC SEMI PRIVATE

## 2024-12-01 PROCEDURE — 6360000002 HC RX W HCPCS

## 2024-12-01 PROCEDURE — 6370000000 HC RX 637 (ALT 250 FOR IP)

## 2024-12-01 PROCEDURE — 83735 ASSAY OF MAGNESIUM: CPT

## 2024-12-01 PROCEDURE — 85027 COMPLETE CBC AUTOMATED: CPT

## 2024-12-01 PROCEDURE — 99232 SBSQ HOSP IP/OBS MODERATE 35: CPT | Performed by: INTERNAL MEDICINE

## 2024-12-01 PROCEDURE — 2580000003 HC RX 258

## 2024-12-01 PROCEDURE — 80053 COMPREHEN METABOLIC PANEL: CPT

## 2024-12-01 PROCEDURE — 6360000002 HC RX W HCPCS: Performed by: INTERNAL MEDICINE

## 2024-12-01 PROCEDURE — 6370000000 HC RX 637 (ALT 250 FOR IP): Performed by: PHYSICIAN ASSISTANT

## 2024-12-01 PROCEDURE — 36415 COLL VENOUS BLD VENIPUNCTURE: CPT

## 2024-12-01 PROCEDURE — 6370000000 HC RX 637 (ALT 250 FOR IP): Performed by: INTERNAL MEDICINE

## 2024-12-01 PROCEDURE — 84100 ASSAY OF PHOSPHORUS: CPT

## 2024-12-01 PROCEDURE — 99233 SBSQ HOSP IP/OBS HIGH 50: CPT | Performed by: STUDENT IN AN ORGANIZED HEALTH CARE EDUCATION/TRAINING PROGRAM

## 2024-12-01 RX ORDER — OXYCODONE HYDROCHLORIDE 5 MG/1
10 TABLET ORAL EVERY 4 HOURS PRN
Status: DISCONTINUED | OUTPATIENT
Start: 2024-12-01 | End: 2024-12-03 | Stop reason: HOSPADM

## 2024-12-01 RX ORDER — OXYCODONE HYDROCHLORIDE 15 MG/1
15 TABLET ORAL EVERY 4 HOURS PRN
Status: DISCONTINUED | OUTPATIENT
Start: 2024-12-01 | End: 2024-12-03 | Stop reason: HOSPADM

## 2024-12-01 RX ADMIN — HYDROMORPHONE HYDROCHLORIDE 0.5 MG: 1 INJECTION, SOLUTION INTRAMUSCULAR; INTRAVENOUS; SUBCUTANEOUS at 10:34

## 2024-12-01 RX ADMIN — OXYCODONE 10 MG: 5 TABLET ORAL at 03:54

## 2024-12-01 RX ADMIN — OXYCODONE HYDROCHLORIDE 15 MG: 15 TABLET ORAL at 13:38

## 2024-12-01 RX ADMIN — CYCLOBENZAPRINE 10 MG: 10 TABLET, FILM COATED ORAL at 15:38

## 2024-12-01 RX ADMIN — CEFEPIME 2000 MG: 2 INJECTION, POWDER, FOR SOLUTION INTRAVENOUS at 17:14

## 2024-12-01 RX ADMIN — ENOXAPARIN SODIUM 30 MG: 100 INJECTION SUBCUTANEOUS at 10:34

## 2024-12-01 RX ADMIN — PANTOPRAZOLE SODIUM 40 MG: 40 TABLET, DELAYED RELEASE ORAL at 07:07

## 2024-12-01 RX ADMIN — ACETAMINOPHEN 650 MG: 325 TABLET ORAL at 23:49

## 2024-12-01 RX ADMIN — SENNOSIDES 8.6 MG: 8.6 TABLET, FILM COATED ORAL at 10:34

## 2024-12-01 RX ADMIN — OXYCODONE HYDROCHLORIDE 15 MG: 15 TABLET ORAL at 21:35

## 2024-12-01 RX ADMIN — SENNOSIDES 8.6 MG: 8.6 TABLET, FILM COATED ORAL at 20:01

## 2024-12-01 RX ADMIN — HYDROMORPHONE HYDROCHLORIDE 0.5 MG: 1 INJECTION, SOLUTION INTRAMUSCULAR; INTRAVENOUS; SUBCUTANEOUS at 20:02

## 2024-12-01 RX ADMIN — CEFEPIME 2000 MG: 2 INJECTION, POWDER, FOR SOLUTION INTRAVENOUS at 03:09

## 2024-12-01 RX ADMIN — ENOXAPARIN SODIUM 30 MG: 100 INJECTION SUBCUTANEOUS at 20:01

## 2024-12-01 RX ADMIN — CEFEPIME 2000 MG: 2 INJECTION, POWDER, FOR SOLUTION INTRAVENOUS at 10:34

## 2024-12-01 RX ADMIN — HYDROMORPHONE HYDROCHLORIDE 0.5 MG: 1 INJECTION, SOLUTION INTRAMUSCULAR; INTRAVENOUS; SUBCUTANEOUS at 16:02

## 2024-12-01 RX ADMIN — OXYCODONE 10 MG: 5 TABLET ORAL at 09:03

## 2024-12-01 ASSESSMENT — PAIN DESCRIPTION - DESCRIPTORS
DESCRIPTORS: ACHING
DESCRIPTORS: ACHING
DESCRIPTORS: ACHING;BURNING;SORE
DESCRIPTORS: BURNING
DESCRIPTORS: ACHING;DULL
DESCRIPTORS: ACHING
DESCRIPTORS: SHARP;SORE;ACHING;DISCOMFORT

## 2024-12-01 ASSESSMENT — PAIN SCALES - GENERAL
PAINLEVEL_OUTOF10: 7
PAINLEVEL_OUTOF10: 6
PAINLEVEL_OUTOF10: 8
PAINLEVEL_OUTOF10: 10
PAINLEVEL_OUTOF10: 9
PAINLEVEL_OUTOF10: 9
PAINLEVEL_OUTOF10: 8
PAINLEVEL_OUTOF10: 8
PAINLEVEL_OUTOF10: 10
PAINLEVEL_OUTOF10: 9
PAINLEVEL_OUTOF10: 8

## 2024-12-01 ASSESSMENT — PAIN DESCRIPTION - FREQUENCY: FREQUENCY: CONTINUOUS

## 2024-12-01 ASSESSMENT — PAIN DESCRIPTION - ONSET: ONSET: PROGRESSIVE

## 2024-12-01 ASSESSMENT — PAIN DESCRIPTION - LOCATION
LOCATION: BACK

## 2024-12-01 ASSESSMENT — PAIN DESCRIPTION - PAIN TYPE
TYPE: SURGICAL PAIN
TYPE: SURGICAL PAIN

## 2024-12-01 ASSESSMENT — PAIN DESCRIPTION - ORIENTATION
ORIENTATION: LOWER

## 2024-12-01 ASSESSMENT — PAIN - FUNCTIONAL ASSESSMENT
PAIN_FUNCTIONAL_ASSESSMENT: PREVENTS OR INTERFERES SOME ACTIVE ACTIVITIES AND ADLS
PAIN_FUNCTIONAL_ASSESSMENT: PREVENTS OR INTERFERES WITH MANY ACTIVE NOT PASSIVE ACTIVITIES
PAIN_FUNCTIONAL_ASSESSMENT: PREVENTS OR INTERFERES SOME ACTIVE ACTIVITIES AND ADLS
PAIN_FUNCTIONAL_ASSESSMENT: PREVENTS OR INTERFERES SOME ACTIVE ACTIVITIES AND ADLS

## 2024-12-01 NOTE — PROGRESS NOTES
[facility] Infectious Disease         Progress Note      Jonathan Goldsmith  MEDICAL RECORD NUMBER:  [id number]  AGE: 49 y.o.   GENDER: male  : 1975  EPISODE DATE:  2024      Assessment:     Patient is a 49-year-old male who I am consulted for epidural abscess.  Blood cultures positive for Serratia.  I would recommend repeat blood cultures on .  Currently on therapy with  cefepime.     Continue cefepime for Serratia  Status post surgical intervention on  with fusion extension to L2 and L2, L3 disc space debridement    Gram-negative bacilli identified from spinal cultures, c/w Serratia species  In the setting of vertebral osteomyelitis and psoas abscess, I would recommend either continuation of cefepime or transition to ciprofloxacin to complete a 6-week course following surgical intervention    Therapy  Cefepime or ciprofloxacin, pending disposition  Duration for 6 weeks  End of therapy 2024  Recommend weekly CBC, BMP, ESR and CRP    Infectious disease follow-up will be scheduled      Subjective:     Chief Complaint   Patient presents with    Back Pain       Patient was seen at the bedside.  No new concerns or concerns.  Patient remains on therapy with cefepime, awaiting disposition plan.        Patient Active Problem List   Diagnosis Code    Epidural abscess  drained G06.2    Opiate abuse, continuous ([facility]) F11.10    Hypertension I10    Non compliance w medication regimen  left AMA yesterday from 4A Z91.148    Postoperative pain G89.18    Hyponatremia E87.1    Discitis M46.40    Osteomyelitis of spine (HCC)  multilevels  from t11 to l5 M46.20    Pain in left thigh M79.652    Subcutaneous nodules R22.9    Injection site reaction T80.90XA    Tobacco chew use Z72.0    Right leg numbness R20.0    Fall at home W19.XXXA, Y92.009    Microcytic anemia D50.9    Intravenous drug abuse (HCC) F19.10    Hepatitis C antibody test positive R76.8    Osteomyelitis M86.9     modulation techniques and iterative reconstructions, and/or weight-based dosing when appropriate to reduce radiation to a low as reasonably achievable.    CT CHEST W CONTRAST    Result Date: 11/24/2024  CT Chest with contrast Indication: Back pain. History of spinal osteomyelitis. Technique: CT chest with intravenous contrast. Coronal and sagittal reformations. Comparison: None Findings: Minor atelectasis. No pleural effusion, or focal consolidation. Old granulomatous disease with calcified mediastinal and hilar lymph nodes and calcified lung granulomata. No bulky mediastinal, hilar, or axillary lymphadenopathy. The heart is normal in size. No moderate or large pericardial effusion. The thoracic aorta is normal in caliber. Partially imaged upper abdomen: No upper abdominal ascites. Calcified hepatic and splenic granulomata. Prior cholecystectomy. Bones: No suspicious osseous lesions. Spondylosis. T5 hemangioma. Chronic T7 anterior wedging deformity. Paravertebral soft tissue are normal.     Impression: No acute thoracic abnormality. This document has been electronically signed by: Brenden Shi MD on 11/24/2024 09:14 PM All CTs at this facility use dose modulation techniques and iterative reconstructions, and/or weight-based dosing when appropriate to reduce radiation to a low as reasonably achievable.       Electronically signed by Mathieu Olivares MD at 7:58 AM

## 2024-12-01 NOTE — PROGRESS NOTES
T2-weighted images were obtained to the lumbar spine. Postcontrast axial and sagittal T1-weighted images were also obtained. FINDINGS: The patient has developed a ventral epidural abscess at the L2-3 level since the prior exams. This is at the disc level and extends over a length of 1.5 cm. In the axial plane, this measures 9 x 9 mm. There is central nonenhancement and peripheral enhancement. This has developed since the prior exam. This is best seen on sagittal image 8 of the postcontrast images. This is seen on axial postcontrast images 12 and 13. There is progressive abnormal signal in the L2 and L3 vertebral bodies consistent with ongoing discitis/osteomyelitis. There is diffuse abnormal signal on the T1-weighted images with diffuse low signal. There is diffuse edema on the STIR sequence and enhancement after contrast. There is worsening fluid signal in the disc space. There are endplate erosions. In the left psoas muscle, there is a paraspinal abscess at the L2-3 level extending over a length of 2.7 cm. In the axial plane this measures 2.7 x 1.6 cm. This is consistent with an abscess extending into the left psoas muscle. This tracks into the neural foramen. There is abnormal enhancement of the medial margin of the right psoas muscle without an abscess in the psoas muscle on the right. There are bilateral pedicle screws in L3-S1. Infection in the L3 vertebral body surrounds the L3 pedicle screws. This also appears to involve the left pedicle screw at L4. All of these areas have associated abnormal signal on the STIR sequence. Above the L2 level, the vertebral bodies have normal signal.  There are no compression fractures.  The visualized aspects of the distal spinal cord are normal. The nerve roots of the cauda equina and the tip of the conus are normal. There is no abnormal enhancement of the nerve roots. There is disc desiccation in the lower thoracic spine. On the axial images, at T11-T12 and T12-L1, there  Comparison: None Findings: The thoracic vertebral bodies are intact without acute fracture. Chronic moderate T7 anterior wedging fracture. Chronic mild T11 anterior wedging deformity. Multilevel disc space narrowing and endplate osteophytosis. Multilevel Schmorls nodes. T5 hemangioma. No high-grade central canal stenosis. Mainly mild neural foramina stenosis T8-T9 down to T10-T11 due to facet arthropathy. No paravertebral soft tissue swelling. Partially imaged lungs: No focal consolidation or pleural effusion. Old granulomatous disease.     Impression: 1. No acute thoracic spinal abnormality. 2. Thoracic spondylosis. This document has been electronically signed by: Brenden Shi MD on 11/24/2024 09:39 PM All CTs at this facility use dose modulation techniques and iterative reconstructions, and/or weight-based dosing when appropriate to reduce radiation to a low as reasonably achievable.    CT ABDOMEN PELVIS W IV CONTRAST Additional Contrast? None    Result Date: 11/24/2024  CT abdomen and pelvis with contrast Indication: Back pain. History of spinal osteomyelitis. Technique: CT abdomen and pelvis with intravenous contrast. Coronal and sagittal reformations. Comparison: MRI lumbar spine 08/22/2024. CT lumbar spine 07/22/2024. CT abdomen/pelvis 9/27/2007. Findings: Partially imaged lung bases: No pleural effusion or focal consolidation. Calcified lung granulomata. Abdomen/pelvis: Calcified hepatic and splenic granulomata. Enlarged spleen up to 15.2 cm. Liver and spleen are otherwise normal. Prior cholecystectomy, new since 2007. Pancreas and adrenal glands are normal. The kidneys enhance symmetrically. No hydronephrosis bilaterally. The large and small bowel is nondilated. Cecum located in the midline, suggesting mobile cecum. Normal appendix. Moderate amount of stool in the colon No bulky abdominal or retroperitoneal lymphadenopathy. The abdominal aorta is normal in caliber. Urinary bladder is partially distended with

## 2024-12-02 LAB
ANION GAP SERPL CALC-SCNC: 9 MEQ/L (ref 8–16)
BACTERIA SPEC AEROBE CULT: ABNORMAL
BACTERIA SPEC ANAEROBE CULT: ABNORMAL
BUN SERPL-MCNC: 11 MG/DL (ref 7–22)
CALCIUM SERPL-MCNC: 8.6 MG/DL (ref 8.5–10.5)
CHLORIDE SERPL-SCNC: 98 MEQ/L (ref 98–111)
CO2 SERPL-SCNC: 28 MEQ/L (ref 23–33)
CREAT SERPL-MCNC: 0.7 MG/DL (ref 0.4–1.2)
CRP SERPL-MCNC: 19.72 MG/DL (ref 0–1)
DEPRECATED RDW RBC AUTO: 42.8 FL (ref 35–45)
ERYTHROCYTE [DISTWIDTH] IN BLOOD BY AUTOMATED COUNT: 14.1 % (ref 11.5–14.5)
ERYTHROCYTE [SEDIMENTATION RATE] IN BLOOD BY WESTERGREN METHOD: 53 MM/HR (ref 0–10)
FUNGUS SPEC CULT: NORMAL
FUNGUS SPEC CULT: NORMAL
FUNGUS SPEC FUNGUS STN: NORMAL
FUNGUS SPEC FUNGUS STN: NORMAL
GFR SERPL CREATININE-BSD FRML MDRD: > 90 ML/MIN/1.73M2
GLUCOSE SERPL-MCNC: 88 MG/DL (ref 70–108)
GRAM STN SPEC: ABNORMAL
HCT VFR BLD AUTO: 33 % (ref 42–52)
HGB BLD-MCNC: 10.4 GM/DL (ref 14–18)
MAGNESIUM SERPL-MCNC: 2.1 MG/DL (ref 1.6–2.4)
MCH RBC QN AUTO: 26.4 PG (ref 26–33)
MCHC RBC AUTO-ENTMCNC: 31.5 GM/DL (ref 32.2–35.5)
MCV RBC AUTO: 83.8 FL (ref 80–94)
ORGANISM: ABNORMAL
PLATELET # BLD AUTO: 344 THOU/MM3 (ref 130–400)
PMV BLD AUTO: 8.7 FL (ref 9.4–12.4)
POTASSIUM SERPL-SCNC: 4.4 MEQ/L (ref 3.5–5.2)
RBC # BLD AUTO: 3.94 MILL/MM3 (ref 4.7–6.1)
SODIUM SERPL-SCNC: 135 MEQ/L (ref 135–145)
WBC # BLD AUTO: 14 THOU/MM3 (ref 4.8–10.8)

## 2024-12-02 PROCEDURE — 6360000002 HC RX W HCPCS

## 2024-12-02 PROCEDURE — 86140 C-REACTIVE PROTEIN: CPT

## 2024-12-02 PROCEDURE — 6360000002 HC RX W HCPCS: Performed by: INTERNAL MEDICINE

## 2024-12-02 PROCEDURE — 99232 SBSQ HOSP IP/OBS MODERATE 35: CPT | Performed by: INTERNAL MEDICINE

## 2024-12-02 PROCEDURE — 36415 COLL VENOUS BLD VENIPUNCTURE: CPT

## 2024-12-02 PROCEDURE — 85027 COMPLETE CBC AUTOMATED: CPT

## 2024-12-02 PROCEDURE — 85651 RBC SED RATE NONAUTOMATED: CPT

## 2024-12-02 PROCEDURE — 6370000000 HC RX 637 (ALT 250 FOR IP)

## 2024-12-02 PROCEDURE — 97116 GAIT TRAINING THERAPY: CPT

## 2024-12-02 PROCEDURE — 1200000000 HC SEMI PRIVATE

## 2024-12-02 PROCEDURE — 2580000003 HC RX 258: Performed by: PHYSICIAN ASSISTANT

## 2024-12-02 PROCEDURE — 99232 SBSQ HOSP IP/OBS MODERATE 35: CPT | Performed by: STUDENT IN AN ORGANIZED HEALTH CARE EDUCATION/TRAINING PROGRAM

## 2024-12-02 PROCEDURE — 80048 BASIC METABOLIC PNL TOTAL CA: CPT

## 2024-12-02 PROCEDURE — 2580000003 HC RX 258

## 2024-12-02 PROCEDURE — 6370000000 HC RX 637 (ALT 250 FOR IP): Performed by: INTERNAL MEDICINE

## 2024-12-02 PROCEDURE — 6370000000 HC RX 637 (ALT 250 FOR IP): Performed by: PHYSICIAN ASSISTANT

## 2024-12-02 PROCEDURE — 97530 THERAPEUTIC ACTIVITIES: CPT

## 2024-12-02 PROCEDURE — 83735 ASSAY OF MAGNESIUM: CPT

## 2024-12-02 PROCEDURE — 97110 THERAPEUTIC EXERCISES: CPT

## 2024-12-02 RX ORDER — LACTOBACILLUS RHAMNOSUS GG 10B CELL
1 CAPSULE ORAL
Status: DISCONTINUED | OUTPATIENT
Start: 2024-12-03 | End: 2024-12-03 | Stop reason: HOSPADM

## 2024-12-02 RX ORDER — MULTIVITAMIN WITH IRON
1 TABLET ORAL DAILY
Status: DISCONTINUED | OUTPATIENT
Start: 2024-12-02 | End: 2024-12-03 | Stop reason: HOSPADM

## 2024-12-02 RX ADMIN — OXYCODONE HYDROCHLORIDE 15 MG: 15 TABLET ORAL at 13:22

## 2024-12-02 RX ADMIN — OXYCODONE HYDROCHLORIDE 15 MG: 15 TABLET ORAL at 08:53

## 2024-12-02 RX ADMIN — CEFEPIME 2000 MG: 2 INJECTION, POWDER, FOR SOLUTION INTRAVENOUS at 08:55

## 2024-12-02 RX ADMIN — OXYCODONE HYDROCHLORIDE 15 MG: 15 TABLET ORAL at 23:14

## 2024-12-02 RX ADMIN — HYDROMORPHONE HYDROCHLORIDE 0.5 MG: 1 INJECTION, SOLUTION INTRAMUSCULAR; INTRAVENOUS; SUBCUTANEOUS at 20:15

## 2024-12-02 RX ADMIN — SODIUM CHLORIDE, PRESERVATIVE FREE 10 ML: 5 INJECTION INTRAVENOUS at 08:54

## 2024-12-02 RX ADMIN — ENOXAPARIN SODIUM 30 MG: 100 INJECTION SUBCUTANEOUS at 08:53

## 2024-12-02 RX ADMIN — SODIUM CHLORIDE, PRESERVATIVE FREE 10 ML: 5 INJECTION INTRAVENOUS at 20:16

## 2024-12-02 RX ADMIN — HYDROMORPHONE HYDROCHLORIDE 0.5 MG: 1 INJECTION, SOLUTION INTRAMUSCULAR; INTRAVENOUS; SUBCUTANEOUS at 10:20

## 2024-12-02 RX ADMIN — ENOXAPARIN SODIUM 30 MG: 100 INJECTION SUBCUTANEOUS at 20:13

## 2024-12-02 RX ADMIN — HYDROMORPHONE HYDROCHLORIDE 0.5 MG: 1 INJECTION, SOLUTION INTRAMUSCULAR; INTRAVENOUS; SUBCUTANEOUS at 16:08

## 2024-12-02 RX ADMIN — ACETAMINOPHEN 650 MG: 325 TABLET ORAL at 20:13

## 2024-12-02 RX ADMIN — PANTOPRAZOLE SODIUM 40 MG: 40 TABLET, DELAYED RELEASE ORAL at 05:06

## 2024-12-02 RX ADMIN — CYCLOBENZAPRINE 10 MG: 10 TABLET, FILM COATED ORAL at 18:55

## 2024-12-02 RX ADMIN — CEFEPIME 2000 MG: 2 INJECTION, POWDER, FOR SOLUTION INTRAVENOUS at 18:52

## 2024-12-02 RX ADMIN — Medication 1 TABLET: at 12:27

## 2024-12-02 RX ADMIN — SODIUM CHLORIDE, PRESERVATIVE FREE 10 ML: 5 INJECTION INTRAVENOUS at 10:20

## 2024-12-02 RX ADMIN — HYDROMORPHONE HYDROCHLORIDE 0.5 MG: 1 INJECTION, SOLUTION INTRAMUSCULAR; INTRAVENOUS; SUBCUTANEOUS at 00:30

## 2024-12-02 RX ADMIN — SODIUM CHLORIDE, PRESERVATIVE FREE 10 ML: 5 INJECTION INTRAVENOUS at 20:19

## 2024-12-02 RX ADMIN — CEFEPIME 2000 MG: 2 INJECTION, POWDER, FOR SOLUTION INTRAVENOUS at 01:37

## 2024-12-02 RX ADMIN — HYDROMORPHONE HYDROCHLORIDE 0.5 MG: 1 INJECTION, SOLUTION INTRAMUSCULAR; INTRAVENOUS; SUBCUTANEOUS at 05:03

## 2024-12-02 ASSESSMENT — PAIN DESCRIPTION - DESCRIPTORS
DESCRIPTORS: ACHING
DESCRIPTORS: ACHING
DESCRIPTORS: SHARP
DESCRIPTORS: ACHING
DESCRIPTORS: BURNING
DESCRIPTORS: ACHING;DISCOMFORT

## 2024-12-02 ASSESSMENT — PAIN SCALES - GENERAL
PAINLEVEL_OUTOF10: 9
PAINLEVEL_OUTOF10: 10
PAINLEVEL_OUTOF10: 8
PAINLEVEL_OUTOF10: 10
PAINLEVEL_OUTOF10: 8

## 2024-12-02 ASSESSMENT — PAIN DESCRIPTION - ORIENTATION
ORIENTATION: LOWER;MID
ORIENTATION: LOWER;MID
ORIENTATION: MID;LOWER
ORIENTATION: MID

## 2024-12-02 ASSESSMENT — PAIN DESCRIPTION - LOCATION
LOCATION: BACK

## 2024-12-02 ASSESSMENT — PAIN - FUNCTIONAL ASSESSMENT
PAIN_FUNCTIONAL_ASSESSMENT: ACTIVITIES ARE NOT PREVENTED
PAIN_FUNCTIONAL_ASSESSMENT: ACTIVITIES ARE NOT PREVENTED
PAIN_FUNCTIONAL_ASSESSMENT: PREVENTS OR INTERFERES WITH MANY ACTIVE NOT PASSIVE ACTIVITIES
PAIN_FUNCTIONAL_ASSESSMENT: ACTIVITIES ARE NOT PREVENTED

## 2024-12-02 ASSESSMENT — PAIN DESCRIPTION - FREQUENCY: FREQUENCY: CONTINUOUS

## 2024-12-02 ASSESSMENT — PAIN DESCRIPTION - PAIN TYPE
TYPE: SURGICAL PAIN
TYPE: SURGICAL PAIN

## 2024-12-02 NOTE — PROGRESS NOTES
Hospitalist Progress Note      Patient:  Jonathan Goldsmith    Unit/Bed:7K-20/020-A  YOB: 1975  MRN: [id number]   Acct: [id number]   PCP: Lan Davenport MD  Date of Admission: 11/24/2024    Assessment/Plan:    Sepsis secondary to serratia marcescens bacteremia  1.5cm ventral spine epidural abscess  Left psoas muscle abscess  Chronic L2-L3 discitis with associated severe bilateral neural foraminal stenosis  S/p reexploration L3-S1 with fusion extension to L2. L2-3 disc space debridement (11/27/24)  Hx of multiple admission for spinal osteomyelitis (most recently 08/2024), lumbar surgery complicated by epidural abscess (2017) who presented on 11/24/2024 for intractable back pain.  Onset of symptoms 3 days prior with back pain, subjective fever, chills, \"burning\" sensation in left leg.  His most recent inpatient stay was in 08/20/2024 for osteomyelitis with discharge to [place] for extended IV antibiotics; however, he left AMA without completing antibiotics. Antibiotic course during admission: vancomycin (11/24-present), ceftriaxone (11/24-11/25), cefepime (11/25-present).    Currently hemodynamically stable. Febrile with tmax 100.8F overnight. WBC uptrend to 14  Postop pain stable  ID and neurosurgery consulted, appreciate recommendations  Currently on cefepime in setting of bacteremia, plan for at least 6 weeks of therapy. High risk of elopement. Hesitant to place PICC. Plan for discharge to [facility]ACH to complete abx.   If IV cefepime is not feasible, then will need to do 6 weeks of PO cipro  End date 1/8/25  Needs weekly CBC, BMP, ESR, CRP  ESR, CRP ordered 12/2/2024   Needs follow up with neurosurgery 14 days postop for suture removal.     History of polysubstance use disorder   Drug screen positive for methamphetamine and fentanyl. Admits to IVDU, methamphetamines and heroin.   Addition and social work consult       LDA: []CVC / []PICC  conus are normal. There is no abnormal enhancement of the nerve roots. There is disc desiccation in the lower thoracic spine. On the axial images, at T11-T12 and T12-L1, there are mild facet degenerative changes. There is no spinal canal or foraminal stenosis. At L1-L2, there is no spinal canal stenosis. There is no foraminal stenosis. There is some edema in the medial margin of the left psoas muscle. At L2-3, this level is described above. There is moderate to severe stenosis of the thecal sac at this level due in part to the epidural abscess. The individual nerve roots are difficult to identify. There is moderate severity bilateral foraminal stenosis at this level. At L3-4, there has been fusion. There is no spinal canal stenosis. There is stable moderate severity bilateral foraminal stenosis. There is edema throughout the psoas muscles. At L4-5, there has been fusion. There is no spinal canal stenosis. There is some edema in both psoas muscles. There is no foraminal stenosis. At L5-S1, there has been fusion and posterior decompression. There is no spinal canal stenosis. There is mild bilateral foraminal stenosis. There is no abnormal enhancement. There are no suspicious findings in the visualized aspects of the retroperitoneum and paraspinal soft tissues.     1. Interval development of a ventral epidural abscess measuring 1.5 cm posterior to the disc at the L2-3 level which is causing worsening spinal canal stenosis. This is now moderate-severe. 2. Interval development of an abscess within the left psoas muscle. 3. Worsening discitis/osteomyelitis at the L2-3 level. 4. Diffuse edema and abnormal signal in both psoas muscles consistent with myositis. These findings were relayed via [employer] to Dr. Kaminski at 2:32 p.m. on 11/25/2024. **This report has been created using voice recognition software. It may contain minor errors which are inherent in voice recognition technology.** Electronically signed by   contrast. Indication: Back pain. History of spinal osteomyelitis. Technique: CT thoracic spine without intravenous contrast. Coronal and sagittal reformations were performed. Comparison: None Findings: The thoracic vertebral bodies are intact without acute fracture. Chronic moderate T7 anterior wedging fracture. Chronic mild T11 anterior wedging deformity. Multilevel disc space narrowing and endplate osteophytosis. Multilevel Schmorls nodes. T5 hemangioma. No high-grade central canal stenosis. Mainly mild neural foramina stenosis T8-T9 down to T10-T11 due to facet arthropathy. No paravertebral soft tissue swelling. Partially imaged lungs: No focal consolidation or pleural effusion. Old granulomatous disease.     Impression: 1. No acute thoracic spinal abnormality. 2. Thoracic spondylosis. This document has been electronically signed by: Brenden Shi MD on 11/24/2024 09:39 PM All CTs at this facility use dose modulation techniques and iterative reconstructions, and/or weight-based dosing when appropriate to reduce radiation to a low as reasonably achievable.    CT ABDOMEN PELVIS W IV CONTRAST Additional Contrast? None    Result Date: 11/24/2024  CT abdomen and pelvis with contrast Indication: Back pain. History of spinal osteomyelitis. Technique: CT abdomen and pelvis with intravenous contrast. Coronal and sagittal reformations. Comparison: MRI lumbar spine 08/22/2024. CT lumbar spine 07/22/2024. CT abdomen/pelvis 9/27/2007. Findings: Partially imaged lung bases: No pleural effusion or focal consolidation. Calcified lung granulomata. Abdomen/pelvis: Calcified hepatic and splenic granulomata. Enlarged spleen up to 15.2 cm. Liver and spleen are otherwise normal. Prior cholecystectomy, new since 2007. Pancreas and adrenal glands are normal. The kidneys enhance symmetrically. No hydronephrosis bilaterally. The large and small bowel is nondilated. Cecum located in the midline, suggesting mobile cecum. Normal appendix.

## 2024-12-02 NOTE — PROGRESS NOTES
Comprehensive Nutrition Assessment    Type and Reason for Visit:  Initial, LOS    Nutrition Recommendations/Plan:   Continue diet per provider and encourage adequate PO intake at best efforts.   Start ONS: Mark (BID) - mixed with starry or other preferred beverages  Recommend MVI to assist with wound healing efforts and probiotic with antibiotic usage     Malnutrition Assessment:  Malnutrition Status:  At risk for malnutrition (12/02/24 1007)    Context:  Acute Illness     Findings of the 6 clinical characteristics of malnutrition:  Energy Intake:   (pt states he chronically eats 1 meal per day)  Weight Loss:  No weight loss (per pt)     Body Fat Loss:  No body fat loss     Muscle Mass Loss:  Unable to assess (moderate losses noted but suspected r/t drug abuse)    Fluid Accumulation:  Unable to assess     Strength:  Not Performed    Nutrition Assessment:     Pt. nutritionally compromised AEB wounds.  At risk for further nutrition compromise r/t increased nutrient needs for wound healing - 1.5 cm ventral spine epidural abscess, L psoas muscle abscess, chronic L2-L3 discitis - s/p re-exploration L3-S1 with fusion extension to L2 and disc space debridement L2-3 (11/27), recent [place] stay - pt left AMA without finishing IV antibiotics, underlying medical condition (PMHx: medical noncompliance, IV drug use, s/p lumbar laminectomy 12/30/16 for L5-S1 abscess - multiple surgeries since).       Nutrition Related Findings:    Pt. Report/Treatments/Miscellaneous: Pt seen, reports good appetite and that he has been eating ~50% of his meals this admit. He notes that \"3 meals/day is crazy\" as he is used to consuming 1 meal per day. He denies weight changes and states he has been stable at 250#; weight fluctuations in EMR over last few years. Pt denies N/V or chewing/swallowing difficulties. Educated on Mark ONS to assist with wound healing efforts - pt agreed to trying; encouraged pt to try mixing with differing beverages  to establish tolerance. Pt denies further nutrition related questions or concerns at this time.   GI Status: BM - 12/1; per EMR and pt report  Pertinent Labs: Reviewed BMP 12/2  Pertinent Meds: cefepime, protonix, senna, IVF, dilaudid, oxy-IR     Wound Type:  (Chronic L2-L3 discitis with associated severe bilateral neural foraminal stenosis  S/p reexploration L3-S1 with fusion extension to L2. L2-3 disc space debridement (11/27/24))       Current Nutrition Intake & Therapies:    Average Meal Intake:  (varied PO intakes)  Average Supplements Intake:  (initiated today)  ADULT DIET; Regular  ADULT ORAL NUTRITION SUPPLEMENT; Breakfast, Lunch, Dinner; Wound Healing Oral Supplement    Anthropometric Measures:  Height: 188 cm (6' 2\")  Ideal Body Weight (IBW): 190 lbs (86 kg)    Admission Body Weight: 100 kg (220 lb 7 oz) (11/24: estimated)  Current Body Weight: 115.1 kg (253 lb 12 oz) (11/27: no edema)  Current BMI (kg/m2): 32.6  Usual Body Weight:  (per pt 250#; per EMR: 8/21/24: 211# 3 oz)        Weight Adjustment For: No Adjustment                 BMI Categories: Obese Class 1 (BMI 30.0-34.9)    Estimated Daily Nutrient Needs:  Energy Requirements Based On: Kcal/kg  Weight Used for Energy Requirements: Other (115.1 kg)  Energy (kcal/day): 3020-0238 kcals (18-20 kcals/kg)  Weight Used for Protein Requirements: Ideal (86.36 kg)  Protein (g/day): ~130 grams (1.5 grams/kg of IBW) - increased needs r/t wound       Nutrition Diagnosis:   Increased nutrient needs related to increase demand for energy/nutrients as evidenced by wounds    Nutrition Interventions:   Food and/or Nutrient Delivery: Continue Current Diet, Start Oral Nutrition Supplement  Nutrition Education/Counseling: Education/Counseling initiated  Coordination of Nutrition Care: Continue to monitor while inpatient       Goals:  Goals: Meet at least 75% of estimated needs, by next RD assessment  Type of Goal: New goal       Nutrition Monitoring and Evaluation:       Food/Nutrient Intake Outcomes: Progression of Nutrition, Food and Nutrient Intake, Supplement Intake  Physical Signs/Symptoms Outcomes: Biochemical Data, GI Status, Fluid Status or Edema, Nutrition Focused Physical Findings, Skin, Weight    Discharge Planning:    Too soon to determine, Continue Oral Nutrition Supplement (If discharged to LTACH)     Jamie Hung RD, LD  Contact: [phone]

## 2024-12-02 NOTE — CARE COORDINATION
12/2/24, 8:31 AM EST    DISCHARGE ON GOING EVALUATION    Jonathan [facility]ACHU[facility] day: 8  Location: -20/020-A Reason for admit: Sepsis (HCC) [A41.9]  Discitis, unspecified spinal region [M46.40]     Procedures:   11/27 OR: Reexploration of L3-S1 Fusion Extension to L2, L2-L3 Disc Space Debridement      11/26 ECHO EF 60-65%  Imaging since last note: none    Barriers to Discharge: Pain and nausea control, tissue and spine hardware both (+) for Serratia marcescens. WBC 14.0, diabetes management, PT/OT, IV Maxipime, Lovenox.   ,   PCP: Lan Davenport MD  Readmission Risk Score: 13.6    Patient Goals/Plan/Treatment Preferences: Precert to be initiated for Bruce Neves.

## 2024-12-02 NOTE — PROGRESS NOTES
[facility]  INPATIENT PHYSICAL THERAPY  DAILY NOTE  [facility] ORTHOPEDICS 7K - 7K-20/020-A      Discharge Recommendations: Continue to assess pending progress  Equipment Recommendations:    Pend pt progress            Time In: 1011  Time Out: 1034  Timed Code Treatment Minutes: 23 Minutes  Minutes: 23          Date: 2024  Patient Name: Jonathan Goldsmith,  Gender:  male        MRN: [id number]  : 1975  (49 y.o.)     Referring Practitioner: Roberto Michaels PA-C  Diagnosis: Sepsis (HCC)  Additional Pertinent Hx: 49 y.o. male with PMHx of osteomyelitis and IV drug abuse who presented to [facility] with chief complaint of back pain. The patient has had several admissions for osteomyelitis, most recently in 2024.  He was treated inpatient then discharged to [place] for extended IV antibiotic therapy and ultimately left AMA before completing his course.  He tells me that this was due to his insurance running out.   He reports that his symptoms began 3 days ago.  He developed severe lower back pain and spasms.  He states he had a 101 fever at home.  He has chills.  He is unable to walk and actually fell at home. He reports lying there and not being able to get up for 3 hours.  He denies hitting his head and no LOC.  He denies any loss of bowel or bladder. He does report a burning sensation down his left leg.2024 12:22 PM   Reexploration of L3-L4, L4-L5, L5-S1 Fusion Extension to L2, L2-L3 Disc Space Debridement  Deb Doherty MD     Prior Level of Function:  Lives With: Other (comment)  Type of Home: Trailer  Home Layout: One level  Home Access: Stairs to enter with rails  Entrance Stairs - Number of Steps: 4  Home Equipment: None   Bathroom Shower/Tub: Tub/Shower unit  Bathroom Toilet: Standard  Bathroom Equipment: None    ADL Assistance: Independent  Homemaking Assistance: Independent  Ambulation Assistance: Independent  Transfer Assistance: Independent  Active :  No    Restrictions/Precautions:  Restrictions/Precautions: General Precautions, Fall Risk  Position Activity Restriction  Spinal Precautions: No Bending, No Lifting, No Twisting     SUBJECTIVE: pt requested pain meds prior to session, RN administered. Pt cooperative for amb in hallway.     PAIN: 9/10: back per pt    Vitals: Vitals not assessed per clinical judgement, see nursing flowsheet    OBJECTIVE:  Bed Mobility:  Rolling to Left: Modified Independent   Rolling to Right: Modified Independent   Supine to Sit: Stand By Assistance, X 1  Sit to Supine: Stand By Assistance, X 1   Scooting: Stand By Assistance, X 1  HOB up 20 degrees, inc time to complete, cues for breathing technique to dec back pain  Transfers:  Sit to Stand: contact guard Assistance, X 1  Stand to Sit:Stand By Assistance, X 1    Ambulation:  Stand By Assistance, X 1  Distance: 275'x1  Surface: Level Tile  Device: Rolling Walker  Gait Deviations: Forward Flexed Posture, Slow Sofia, Decreased Heel Strike Bilaterally, Mild Path Deviations, and with inc distance pt with inc fatigue and more fwd flexed posture, cues for erect posture     Stairs:  Not Tested    Balance:  Static Sitting Balance:  Modified Independent, X 1  Dynamic Sitting Balance: Supervision, X 1, donning socks  Static Standing Balance: Contact Guard Assistance, to SBAx1 with RW, cues for erect posture    Exercise:  None    Functional Outcome Measures:  [facility] (6 CLICK) BASIC MOBILITY  AM-[facility] Mobility Raw Score : 19  AM-[facility] Inpatient T-Scale Score : 45.44        Modified Wilfrido Scale:  Not Applicable    ASSESSMENT:  Assessment: Patient progressing toward established goals. Pt able to amb in hallway but cont with inc back pain and use of RW with one assist.  Activity Tolerance:  Patient tolerance of  treatment:Fair.  Plan: Current Treatment Recommendations: Strengthening, Balance training, Functional mobility training, Transfer training, Endurance training, Gait training, Stair

## 2024-12-02 NOTE — PROGRESS NOTES
No Bending, No Lifting, No Twisting     Social/Functional History:  Lives With: Other (comment)  Type of Home: Trailer  Home Layout: One level  Home Access: Stairs to enter with rails  Entrance Stairs - Number of Steps: 4  Home Equipment: None   Bathroom Shower/Tub: Tub/Shower unit  Bathroom Toilet: Standard  Bathroom Equipment: None       ADL Assistance: Independent  Homemaking Assistance: Independent  Ambulation Assistance: Independent  Transfer Assistance: Independent    Active : No  Patient's  Info: friend drives  Occupation: Part time employment  Type of Occupation:   Leisure & Hobbies: Model planes/cars       SUBJECTIVE: RN okayed OT session.  Pt. In room seated on EOB with complaint of lower back pain although agreeable to participate.     PAIN: 8/10: RN provided pain medication     Vitals: Vitals not assessed per clinical judgement, see nursing flowsheet    COGNITION: Decreased Problem Solving and Decreased Safety Awareness    ADL:   No ADL's completed this session..    IADL:   Not Tested    BALANCE:  Sitting Balance:  Modified Independent. Seated on EOB  Standing Balance: Stand By Assistance. With support of RW    BED MOBILITY:  Not Tested    TRANSFERS:  Sit to Stand:  Stand By Assistance.    Stand to Sit: Stand By Assistance.      FUNCTIONAL MOBILITY:  Assistive Device: Rolling Walker  Assist Level:  Stand By Assistance.   Distance: Within room and Household distances within unit  No LOB, good pace     **Consulted with OT Regarding Pt. Progress along with Transfer and functional mobility status during treatment.  OTR to update goals/goal.     ADDITIONAL ACTIVITIES:  Addressed increasing patient's UB strength through completion of upper extremity  theraband exercises using moderate resistance band. Pt completed pull aparts, shoulder flexion, elbow extension, horizontal  abduction and chest press x12 reps each while seated for increased strength and overall activity tolerance to  support Adls. .       Precautions reviewed Pt. Unable to recall initially.     Modified Ida Scale:  Not Applicable    ASSESSMENT:     Activity Tolerance:  Patient tolerance of  treatment: Good treatment tolerance      Plan: Times Per Week: 5-6x  Current Treatment Recommendations: Strengthening, ROM, Balance training, Functional mobility training, Endurance training, Pain management, Safety education & training, Patient/Caregiver education & training, Equipment evaluation, education, & procurement, Self-Care / ADL, Home management training    Education:  Learners: Patient  Home Exercise Program, Home Safety, Importance of Increasing Activity, Fall Prevention, and Assistive Device Safety    Goals  Short Term Goals  Time Frame for Short Term Goals: Until discharge  Short Term Goal 1: Pt will tolerate EOB sitting >10 minutes with SBA to improve ADL participation.  Short Term Goal 2: Pt will complete grooming tasks with Gladis for increased (I) in ADLs.  Short Term Goal 3: Pt will complete LB ADLs with S and 0-2 cues for spinal precautions to increase (I) with ADL.  Short Term Goal 4: Pt will complete BUE HEP (I) x10-15 reps with mod resistance and no cues to increase activity tolerance.  Short Term Goal 5: Pt will tolerate further assessment of functional transfers/mobility with OTR as appropriate.  Additional Goals?: No  Long Term Goals  Time Frame for Long Term Goals : none stated d/t ELOS.    Following session, patient left in safe position with all fall risk precautions in place.

## 2024-12-02 NOTE — PLAN OF CARE
Problem: Discharge Planning  Goal: Discharge to home or other facility with appropriate resources  Outcome: Progressing  Flowsheets (Taken 12/2/2024 1657)  Discharge to home or other facility with appropriate resources: Identify barriers to discharge with patient and caregiver     Problem: Pain  Goal: Verbalizes/displays adequate comfort level or baseline comfort level  Outcome: Progressing  Flowsheets (Taken 12/2/2024 1657)  Verbalizes/displays adequate comfort level or baseline comfort level: Encourage patient to monitor pain and request assistance     Problem: Safety - Adult  Goal: Free from fall injury  Outcome: Progressing  Flowsheets (Taken 12/2/2024 1657)  Free From Fall Injury: Instruct family/caregiver on patient safety     Problem: ABCDS Injury Assessment  Goal: Absence of physical injury  Outcome: Progressing  Flowsheets (Taken 12/2/2024 1657)  Absence of Physical Injury: Implement safety measures based on patient assessment     Problem: Risk for Elopement  Goal: Patient will not exit the unit/facility without proper excort  Outcome: Progressing  Flowsheets (Taken 12/2/2024 1657)  Nursing Interventions for Elopement Risk: Assist with personal care needs such as toileting, eating, dressing, as needed to reduce the risk of wandering     Problem: Nutrition Deficit:  Goal: Optimize nutritional status  Outcome: Progressing  Flowsheets (Taken 12/2/2024 1657)  Nutrient intake appropriate for improving, restoring, or maintaining nutritional needs: Assess nutritional status and recommend course of action     Problem: Skin/Tissue Integrity - Adult  Goal: Incisions, wounds, or drain sites healing without S/S of infection  Outcome: Progressing  Flowsheets (Taken 12/2/2024 1657)  Incisions, Wounds, or Drain Sites Healing Without Sign and Symptoms of Infection: ADMISSION and DAILY: Assess and document risk factors for pressure ulcer development     Problem: Musculoskeletal - Adult  Goal: Return mobility to safest

## 2024-12-02 NOTE — PROGRESS NOTES
[facility] Infectious Disease         Progress Note      Jonathan Goldsmith  MEDICAL RECORD NUMBER:  [id number]  AGE: 49 y.o.   GENDER: male  : 1975  EPISODE DATE:  2024      Assessment:     Patient is a 49-year-old male who I am consulted for epidural abscess.  Blood cultures positive for Serratia.  I would recommend repeat blood cultures on .  Currently on therapy with  cefepime.     Continue cefepime for Serratia  Status post surgical intervention on  with fusion extension to L2, L3 disc space debridement    In the setting of vertebral osteomyelitis and psoas abscess, I would recommend either continuation of cefepime or transition to ciprofloxacin to complete a 6-week course following surgical intervention    Tentatively, patient will complete 6 weeks of parenteral therapy at care facility.  Concerns for placing PICC line in patient with history of substance abuse.  Patient preferences towards oral antimicrobials rather than IV.  I have explained the potential risks and he understands that IV is preferred.    Therapy  Cefepime or ciprofloxacin, pending disposition  Duration for 6 weeks  End of therapy 2024  Recommend weekly CBC, BMP, ESR and CRP    Infectious disease follow-up will be scheduled      Subjective:     Chief Complaint   Patient presents with    Back Pain       Patient was seen at the bedside.  No new concerns or concerns.  Patient remains on therapy with cefepime.  Continues to have postoperative pain.        Patient Active Problem List   Diagnosis Code    Epidural abscess  drained G06.2    Opiate abuse, continuous ([facility]) F11.10    Hypertension I10    Non compliance w medication regimen  left AMA yesterday from 4A Z91.148    Postoperative pain G89.18    Hyponatremia E87.1    Discitis M46.40    Osteomyelitis of spine (HCC)  multilevels  from t11 to l5 M46.20    Pain in left thigh M79.652    Subcutaneous nodules R22.9    Injection site reaction T80.90XA  collection L2-L3 to suggest acute component. 2. No acute intra-abdominal abnormality. 3. Mild splenomegaly. This document has been electronically signed by: Brenden Shi MD on 11/24/2024 09:32 PM All CTs at this facility use dose modulation techniques and iterative reconstructions, and/or weight-based dosing when appropriate to reduce radiation to a low as reasonably achievable.    CT CHEST W CONTRAST    Result Date: 11/24/2024  CT Chest with contrast Indication: Back pain. History of spinal osteomyelitis. Technique: CT chest with intravenous contrast. Coronal and sagittal reformations. Comparison: None Findings: Minor atelectasis. No pleural effusion, or focal consolidation. Old granulomatous disease with calcified mediastinal and hilar lymph nodes and calcified lung granulomata. No bulky mediastinal, hilar, or axillary lymphadenopathy. The heart is normal in size. No moderate or large pericardial effusion. The thoracic aorta is normal in caliber. Partially imaged upper abdomen: No upper abdominal ascites. Calcified hepatic and splenic granulomata. Prior cholecystectomy. Bones: No suspicious osseous lesions. Spondylosis. T5 hemangioma. Chronic T7 anterior wedging deformity. Paravertebral soft tissue are normal.     Impression: No acute thoracic abnormality. This document has been electronically signed by: Brenden Shi MD on 11/24/2024 09:14 PM All CTs at this facility use dose modulation techniques and iterative reconstructions, and/or weight-based dosing when appropriate to reduce radiation to a low as reasonably achievable.       Electronically signed by Mathieu Olivares MD at 5:53 AM

## 2024-12-03 ENCOUNTER — HOSPITAL ENCOUNTER (OUTPATIENT)
Age: 49
Discharge: HOME | End: 2024-12-12
Attending: INTERNAL MEDICINE
Payer: COMMERCIAL

## 2024-12-03 VITALS
SYSTOLIC BLOOD PRESSURE: 102 MMHG | BODY MASS INDEX: 32.57 KG/M2 | HEART RATE: 81 BPM | OXYGEN SATURATION: 95 % | DIASTOLIC BLOOD PRESSURE: 61 MMHG | RESPIRATION RATE: 18 BRPM | TEMPERATURE: 98.4 F | HEIGHT: 74 IN | WEIGHT: 253.75 LBS

## 2024-12-03 PROBLEM — A41.9 SEPSIS (HCC): Status: RESOLVED | Noted: 2024-11-24 | Resolved: 2024-12-03

## 2024-12-03 LAB
ANION GAP SERPL CALC-SCNC: 13 MEQ/L (ref 8–16)
BUN SERPL-MCNC: 13 MG/DL (ref 7–22)
CALCIUM SERPL-MCNC: 8.6 MG/DL (ref 8.5–10.5)
CHLORIDE SERPL-SCNC: 97 MEQ/L (ref 98–111)
CO2 SERPL-SCNC: 26 MEQ/L (ref 23–33)
CREAT SERPL-MCNC: 0.7 MG/DL (ref 0.4–1.2)
DEPRECATED RDW RBC AUTO: 42.4 FL (ref 35–45)
ERYTHROCYTE [DISTWIDTH] IN BLOOD BY AUTOMATED COUNT: 14.1 % (ref 11.5–14.5)
GFR SERPL CREATININE-BSD FRML MDRD: > 90 ML/MIN/1.73M2
GLUCOSE SERPL-MCNC: 90 MG/DL (ref 70–108)
HCT VFR BLD AUTO: 33.8 % (ref 42–52)
HGB BLD-MCNC: 10.6 GM/DL (ref 14–18)
MAGNESIUM SERPL-MCNC: 2 MG/DL (ref 1.6–2.4)
MCH RBC QN AUTO: 26.2 PG (ref 26–33)
MCHC RBC AUTO-ENTMCNC: 31.4 GM/DL (ref 32.2–35.5)
MCV RBC AUTO: 83.5 FL (ref 80–94)
PLATELET # BLD AUTO: 372 THOU/MM3 (ref 130–400)
PMV BLD AUTO: 9 FL (ref 9.4–12.4)
POTASSIUM SERPL-SCNC: 4.3 MEQ/L (ref 3.5–5.2)
RBC # BLD AUTO: 4.05 MILL/MM3 (ref 4.7–6.1)
SODIUM SERPL-SCNC: 136 MEQ/L (ref 135–145)
WBC # BLD AUTO: 13.8 THOU/MM3 (ref 4.8–10.8)

## 2024-12-03 PROCEDURE — 6370000000 HC RX 637 (ALT 250 FOR IP): Performed by: INTERNAL MEDICINE

## 2024-12-03 PROCEDURE — 2580000003 HC RX 258

## 2024-12-03 PROCEDURE — 6360000002 HC RX W HCPCS

## 2024-12-03 PROCEDURE — 83735 ASSAY OF MAGNESIUM: CPT

## 2024-12-03 PROCEDURE — 6360000002 HC RX W HCPCS: Performed by: INTERNAL MEDICINE

## 2024-12-03 PROCEDURE — 6370000000 HC RX 637 (ALT 250 FOR IP)

## 2024-12-03 PROCEDURE — 85027 COMPLETE CBC AUTOMATED: CPT

## 2024-12-03 PROCEDURE — 2580000003 HC RX 258: Performed by: PHYSICIAN ASSISTANT

## 2024-12-03 PROCEDURE — 97530 THERAPEUTIC ACTIVITIES: CPT

## 2024-12-03 PROCEDURE — 80048 BASIC METABOLIC PNL TOTAL CA: CPT

## 2024-12-03 PROCEDURE — 99231 SBSQ HOSP IP/OBS SF/LOW 25: CPT | Performed by: INTERNAL MEDICINE

## 2024-12-03 PROCEDURE — 99232 SBSQ HOSP IP/OBS MODERATE 35: CPT | Performed by: STUDENT IN AN ORGANIZED HEALTH CARE EDUCATION/TRAINING PROGRAM

## 2024-12-03 PROCEDURE — 36415 COLL VENOUS BLD VENIPUNCTURE: CPT

## 2024-12-03 RX ORDER — ACETAMINOPHEN 325 MG/1
650 TABLET ORAL EVERY 6 HOURS PRN
OUTPATIENT
Start: 2024-12-03

## 2024-12-03 RX ORDER — ONDANSETRON 2 MG/ML
4 INJECTION INTRAMUSCULAR; INTRAVENOUS EVERY 6 HOURS PRN
OUTPATIENT
Start: 2024-12-03

## 2024-12-03 RX ORDER — LACTOBACILLUS RHAMNOSUS GG 10B CELL
1 CAPSULE ORAL
OUTPATIENT
Start: 2024-12-04

## 2024-12-03 RX ORDER — OXYCODONE HYDROCHLORIDE 15 MG/1
15 TABLET ORAL EVERY 4 HOURS PRN
OUTPATIENT
Start: 2024-12-03

## 2024-12-03 RX ORDER — PANTOPRAZOLE SODIUM 40 MG/1
40 TABLET, DELAYED RELEASE ORAL
OUTPATIENT
Start: 2024-12-04

## 2024-12-03 RX ORDER — POLYETHYLENE GLYCOL 3350 17 G/17G
17 POWDER, FOR SOLUTION ORAL DAILY
OUTPATIENT
Start: 2024-12-04

## 2024-12-03 RX ORDER — CYCLOBENZAPRINE HCL 10 MG
10 TABLET ORAL EVERY 12 HOURS PRN
OUTPATIENT
Start: 2024-12-03

## 2024-12-03 RX ORDER — LIDOCAINE 4 G/G
1 PATCH TOPICAL DAILY
OUTPATIENT
Start: 2024-12-04

## 2024-12-03 RX ORDER — OXYCODONE HYDROCHLORIDE 5 MG/1
10 TABLET ORAL EVERY 4 HOURS PRN
OUTPATIENT
Start: 2024-12-03

## 2024-12-03 RX ORDER — ENOXAPARIN SODIUM 100 MG/ML
30 INJECTION SUBCUTANEOUS 2 TIMES DAILY
OUTPATIENT
Start: 2024-12-03

## 2024-12-03 RX ORDER — MULTIVITAMIN WITH IRON
1 TABLET ORAL DAILY
OUTPATIENT
Start: 2024-12-04

## 2024-12-03 RX ORDER — SENNOSIDES A AND B 8.6 MG/1
1 TABLET, FILM COATED ORAL 2 TIMES DAILY
OUTPATIENT
Start: 2024-12-03

## 2024-12-03 RX ADMIN — Medication 1 CAPSULE: at 09:16

## 2024-12-03 RX ADMIN — PANTOPRAZOLE SODIUM 40 MG: 40 TABLET, DELAYED RELEASE ORAL at 06:04

## 2024-12-03 RX ADMIN — HYDROMORPHONE HYDROCHLORIDE 0.5 MG: 1 INJECTION, SOLUTION INTRAMUSCULAR; INTRAVENOUS; SUBCUTANEOUS at 09:19

## 2024-12-03 RX ADMIN — SODIUM CHLORIDE, PRESERVATIVE FREE 10 ML: 5 INJECTION INTRAVENOUS at 09:17

## 2024-12-03 RX ADMIN — Medication 1 TABLET: at 09:16

## 2024-12-03 RX ADMIN — CEFEPIME 2000 MG: 2 INJECTION, POWDER, FOR SOLUTION INTRAVENOUS at 01:48

## 2024-12-03 RX ADMIN — SODIUM CHLORIDE, PRESERVATIVE FREE 10 ML: 5 INJECTION INTRAVENOUS at 09:20

## 2024-12-03 RX ADMIN — ENOXAPARIN SODIUM 30 MG: 100 INJECTION SUBCUTANEOUS at 09:16

## 2024-12-03 RX ADMIN — CEFEPIME 2000 MG: 2 INJECTION, POWDER, FOR SOLUTION INTRAVENOUS at 09:19

## 2024-12-03 RX ADMIN — HYDROMORPHONE HYDROCHLORIDE 0.5 MG: 1 INJECTION, SOLUTION INTRAMUSCULAR; INTRAVENOUS; SUBCUTANEOUS at 02:54

## 2024-12-03 RX ADMIN — OXYCODONE HYDROCHLORIDE 15 MG: 15 TABLET ORAL at 06:08

## 2024-12-03 RX ADMIN — OXYCODONE HYDROCHLORIDE 15 MG: 15 TABLET ORAL at 12:41

## 2024-12-03 ASSESSMENT — PAIN DESCRIPTION - DESCRIPTORS
DESCRIPTORS: ACHING
DESCRIPTORS: SHARP
DESCRIPTORS: BURNING
DESCRIPTORS: SHARP;NAGGING

## 2024-12-03 ASSESSMENT — PAIN SCALES - GENERAL
PAINLEVEL_OUTOF10: 9
PAINLEVEL_OUTOF10: 1
PAINLEVEL_OUTOF10: 0
PAINLEVEL_OUTOF10: 8
PAINLEVEL_OUTOF10: 10

## 2024-12-03 ASSESSMENT — PAIN DESCRIPTION - LOCATION
LOCATION: BACK
LOCATION: BACK;LEG

## 2024-12-03 ASSESSMENT — PAIN DESCRIPTION - PAIN TYPE: TYPE: SURGICAL PAIN

## 2024-12-03 ASSESSMENT — PAIN - FUNCTIONAL ASSESSMENT
PAIN_FUNCTIONAL_ASSESSMENT: ACTIVITIES ARE NOT PREVENTED
PAIN_FUNCTIONAL_ASSESSMENT: ACTIVITIES ARE NOT PREVENTED

## 2024-12-03 ASSESSMENT — PAIN DESCRIPTION - ORIENTATION
ORIENTATION: MID
ORIENTATION: LEFT;MID;LOWER
ORIENTATION: MID;LOWER
ORIENTATION: MID

## 2024-12-03 NOTE — PROGRESS NOTES
[facility]  INPATIENT PHYSICAL THERAPY  DAILY NOTE  [facility] ORTHOPEDICS 7K - 7K-20/020-A      Discharge Recommendations: Continue to assess pending progress and Patient would benefit from continued PT at discharge  Equipment Recommendations:    Pend pt progress            Time In: 08  Time Out: 09  Timed Code Treatment Minutes: 16 Minutes  Minutes: 16          Date: 12/3/2024  Patient Name: Jonathan Goldsmith,  Gender:  male        MRN: [id number]  : 1975  (49 y.o.)     Referring Practitioner: Roberto Michaels PA-C  Diagnosis: Sepsis (HCC)  Additional Pertinent Hx: 49 y.o. male with PMHx of osteomyelitis and IV drug abuse who presented to [facility] with chief complaint of back pain. The patient has had several admissions for osteomyelitis, most recently in 2024.  He was treated inpatient then discharged to [place] for extended IV antibiotic therapy and ultimately left AMA before completing his course.  He tells me that this was due to his insurance running out.   He reports that his symptoms began 3 days ago.  He developed severe lower back pain and spasms.  He states he had a 101 fever at home.  He has chills.  He is unable to walk and actually fell at home. He reports lying there and not being able to get up for 3 hours.  He denies hitting his head and no LOC.  He denies any loss of bowel or bladder. He does report a burning sensation down his left leg.2024 12:22 PM   Reexploration of L3-L4, L4-L5, L5-S1 Fusion Extension to L2, L2-L3 Disc Space Debridement  Deb Doherty MD     Prior Level of Function:  Lives With: Other (comment)  Type of Home: Trailer  Home Layout: One level  Home Access: Stairs to enter with rails  Entrance Stairs - Number of Steps: 4  Home Equipment: None   Bathroom Shower/Tub: Tub/Shower unit  Bathroom Toilet: Standard  Bathroom Equipment: None    ADL Assistance: Independent  Homemaking Assistance: Independent  Ambulation Assistance: Independent  Transfer

## 2024-12-03 NOTE — PROGRESS NOTES
[facility] Infectious Disease         Progress Note      Jonathan Goldsmith  MEDICAL RECORD NUMBER:  [id number]  AGE: 49 y.o.   GENDER: male  : 1975  EPISODE DATE:  2024      Assessment:     Patient is a 49-year-old male who I am consulted for epidural abscess.  Blood cultures positive for Serratia.  I would recommend repeat blood cultures on .  Currently on therapy with  cefepime.     Continue cefepime for Serratia  Status post surgical intervention on  with fusion extension to L2, L3 disc space debridement    In the setting of vertebral osteomyelitis and psoas abscess, I would recommend either continuation of cefepime or transition to ciprofloxacin to complete a 6-week course following surgical intervention    Tentatively, patient will complete 6 weeks of parenteral therapy at care facility.  Concerns for placing PICC line in patient with history of substance abuse.  Patient preferences towards oral antimicrobials rather than IV.  I have explained the potential risks and he understands that IV is preferred.    Therapy  Cefepime or ciprofloxacin, pending disposition   - I would highly recommend IV though patient seems fairly resistant and would prefer oral  Duration for 6 weeks  End of therapy 2024  Recommend weekly CBC, BMP, ESR and CRP    Infectious disease follow-up will be scheduled      Subjective:     Chief Complaint   Patient presents with    Back Pain       Patient was seen at the bedside.  No new concerns or concerns.  Patient remains on therapy with cefepime.  Continues to have postoperative pain.        Patient Active Problem List   Diagnosis Code    Epidural abscess  drained G06.2    Opiate abuse, continuous ([facility]) F11.10    Hypertension I10    Non compliance w medication regimen  left AMA yesterday from 4A Z91.148    Postoperative pain G89.18    Hyponatremia E87.1    Discitis M46.40    Osteomyelitis of spine ([facility])  multilevels  from t11 to l5 M46.20     prior to encounter.     No current outpatient medications on file prior to encounter.       REVIEW OF SYSTEMS    Constitutional: +fever, no night sweats, no fatigue.  Head: no head ache , no head injury, no migranes.  Eye: no blurring of vision, no double vision.  Ears: no hearing difficulty, no tinnitus  Mouth/throat: no ulceration, dental caries , dysphagia  Lungs: no cough, no shortness of breath, no wheeze  CVS: no palpitation, no chest pain, no shortness of breath  GI: no abdominal pain, no nausea , no vomiting, no constipation  UMAIR: no dysuria, frequency and urgency, no hematuria, no kidney stones  Musculoskeletal: +back pain, no other  swelling , stiffness,  Endocrine: no polyuria, polydipsia, no cold or heat intolerance  Hematology: no anemia, no easy brusing or bleeding, no hx of clotting disorder  Dermatology: no skin rash, no eczema, no pruritis,  Psychiatry: no depression, no anxiety,no panic attacks, no suicide ideation      Objective:      BP (!) 134/58   Pulse 87   Temp 98.9 °F (37.2 °C) (Oral)   Resp 18   Ht 1.88 m (6' 2\")   Wt 115.1 kg (253 lb 12 oz)   SpO2 98%   BMI 32.58 kg/m²     Wt Readings from Last 3 Encounters:   11/27/24 115.1 kg (253 lb 12 oz)   08/23/24 105.6 kg (232 lb 12.9 oz)   07/18/24 104.3 kg (230 lb)       There is no immunization history for the selected administration types on file for this patient.    PHYSICAL EXAM    BP (!) 134/58   Pulse 87   Temp 98.9 °F (37.2 °C) (Oral)   Resp 18   Ht 1.88 m (6' 2\")   Wt 115.1 kg (253 lb 12 oz)   SpO2 98%   BMI 32.58 kg/m²   General:  Awake, alert, not in distress.  HEENT: pink conjunctiva, unicteric sclera, moist oral mucosa.  Chest:  bilateral air entry, Clear to auscultation  Spine: minimal tenderness to lumbar spine region. No tenderness to palpation of cervical or thoracic spine  Cardiovascular:  RRR ,S1S2, no murmur or gallop.  Abdomen:  Soft, non tender to palpation.  Extremities: no edema  Skin:  Warm and dry. No lesions  Schmorls nodes. T5 hemangioma. No high-grade central canal stenosis. Mainly mild neural foramina stenosis T8-T9 down to T10-T11 due to facet arthropathy. No paravertebral soft tissue swelling. Partially imaged lungs: No focal consolidation or pleural effusion. Old granulomatous disease.     Impression: 1. No acute thoracic spinal abnormality. 2. Thoracic spondylosis. This document has been electronically signed by: Brenden Shi MD on 11/24/2024 09:39 PM All CTs at this facility use dose modulation techniques and iterative reconstructions, and/or weight-based dosing when appropriate to reduce radiation to a low as reasonably achievable.    CT ABDOMEN PELVIS W IV CONTRAST Additional Contrast? None    Result Date: 11/24/2024  CT abdomen and pelvis with contrast Indication: Back pain. History of spinal osteomyelitis. Technique: CT abdomen and pelvis with intravenous contrast. Coronal and sagittal reformations. Comparison: MRI lumbar spine 08/22/2024. CT lumbar spine 07/22/2024. CT abdomen/pelvis 9/27/2007. Findings: Partially imaged lung bases: No pleural effusion or focal consolidation. Calcified lung granulomata. Abdomen/pelvis: Calcified hepatic and splenic granulomata. Enlarged spleen up to 15.2 cm. Liver and spleen are otherwise normal. Prior cholecystectomy, new since 2007. Pancreas and adrenal glands are normal. The kidneys enhance symmetrically. No hydronephrosis bilaterally. The large and small bowel is nondilated. Cecum located in the midline, suggesting mobile cecum. Normal appendix. Moderate amount of stool in the colon No bulky abdominal or retroperitoneal lymphadenopathy. The abdominal aorta is normal in caliber. Urinary bladder is partially distended with a normal contour. Mildly enlarged prostate Fat containing umbilical and left inguinal hernias. Bones: Spondylosis posterior fusion L3-S1 along with multilevel posterior decompression. Chronic inferior L2 and superior L3 endplate irregularities with

## 2024-12-03 NOTE — DISCHARGE INSTR - COC
Continuity of Care Form    Patient Name: Jonathan Goldsmith   :  1975  MRN:  [id number]    Admit date:  2024  Discharge date:  12/3/2024    Code Status Order: Full Code   Advance Directives:   Advance Care Flowsheet Documentation             Admitting Physician:  PRATIBHA Huerta - DELFINO  PCP: Lan Davenport MD    Discharging Nurse: Erendira COX  Discharging Hospital Unit/Room#: 7K-20/020-A  Discharging Unit Phone Number: [phone]    Emergency Contact:   Extended Emergency Contact Information  Primary Emergency Contact: Rachel Goldsmith  Home Phone: [phone]  Relation: Brother/Sister    Past Surgical History:  Past Surgical History:   Procedure Laterality Date    BACK SURGERY  2017    L5-S1 bilateral repeat laminectomies with resection of epidural granulation tissue and bilateral L5-S1 microdiscectomy  on 17    BACK SURGERY  2017    Lumbar laminectomy, L3-S1 decompression, PSF, lumbar incision and drainage--Dr Barroso    CHOLECYSTECTOMY      LUMBAR FUSION N/A 2024    Reexploration of L3-L4, L4-L5, L5-S1 Fusion Extension to L2, L2-L3 Disc Space Debridement performed by Deb Doherty MD at [facility]    LUMBAR LAMINECTOMY  2016    L5 - S1 for abcess       Immunization History:   There is no immunization history for the selected administration types on file for this patient.    Active Problems:  Patient Active Problem List   Diagnosis Code    Epidural abscess  drained G06.2    Opiate abuse, continuous (HCC) F11.10    Hypertension I10    Non compliance w medication regimen  left AMA yesterday from 4A Z91.148    Postoperative pain G89.18    Hyponatremia E87.1    Discitis M46.40    Osteomyelitis of spine (HCC)  multilevels  from t11 to l5 M46.20    Pain in left thigh M79.652    Subcutaneous nodules R22.9    Injection site reaction T80.90XA    Tobacco chew use Z72.0    Right leg numbness R20.0    Fall at home W19.XXXA, Y92.009    Microcytic anemia D50.9    Intravenous drug abuse  Piggyback:176.5]  Out: 2500 [Urine:2500]    Safety Concerns:     At Risk for Falls    Impairments/Disabilities:      None    Nutrition Therapy:  Current Nutrition Therapy:   - Oral Diet:  General    Routes of Feeding: None  Liquids: No Restrictions  Daily Fluid Restriction: no  Last Modified Barium Swallow with Video (Video Swallowing Test): not done    Treatments at the Time of Hospital Discharge:   Respiratory Treatments: None  Oxygen Therapy:  is not on home oxygen therapy.  Ventilator:    - No ventilator support    Rehab Therapies: Physical Therapy and Occupational Therapy  Weight Bearing Status/Restrictions: No weight bearing restrictions  Other Medical Equipment (for information only, NOT a DME order):  None  Other Treatments: dry dressing changes    Patient's personal belongings (please select all that are sent with patient):  Personal clothes, cell phone, body wash, personal hygiene, watch    RN SIGNATURE:  Electronically signed by Erendira De Leon RN on 12/3/24 at 2:03 PM EST    CASE MANAGEMENT/SOCIAL WORK SECTION    Inpatient Status Date: ***    Readmission Risk Assessment Score:  Readmission Risk              Risk of Unplanned Readmission:  25           Discharging to Facility/ Agency   Name:   Address:  Phone:  Fax:    Dialysis Facility (if applicable)   Name:  Address:  Dialysis Schedule:  Phone:  Fax:    / signature: {Esignature:[id number]}    PHYSICIAN SECTION    Prognosis: Good    Condition at Discharge: Stable    Rehab Potential (if transferring to Rehab): Good    Recommended Labs or Other Treatments After Discharge: CBC, BMP, CRP, ESR weekly    Physician Certification: I certify the above information and transfer of Jonathan Goldsmith  is necessary for the continuing treatment of the diagnosis listed and that he requires LTAC for greater 30 days.     Update Admission H&P: No change in H&P    PHYSICIAN SIGNATURE:  Electronically signed by Devon Saenz DO on 12/3/24 at 11:58 AM EST

## 2024-12-03 NOTE — DISCHARGE SUMMARY
Hospitalist Discharge Summary        Patient: Jonathan Goldsmith  YOB: 1975  MRN: [id number]   Acct: [id number]    Primary Care Physician: Lan Davenport MD    Admit date  11/24/2024    Discharge date: 12/3/2024     Chief Complaint on presentation :-  back pain    Discharge Assessment and Plan:-   Sepsis secondary to serratia marcescens bacteremia  1.5cm ventral spine epidural abscess  Left psoas muscle abscess  Chronic L2-L3 discitis with associated severe bilateral neural foraminal stenosis  S/p reexploration L3-S1 with fusion extension to L2. L2-3 disc space debridement (11/27/24)  Hx of multiple admission for spinal osteomyelitis (most recently 08/2024), lumbar surgery complicated by epidural abscess (2017) who presented on 11/24/2024 for intractable back pain.  Onset of symptoms 3 days prior with back pain, subjective fever, chills, \"burning\" sensation in left leg.  His most recent inpatient stay was in 08/20/2024 for osteomyelitis with discharge to [facility] for extended IV antibiotics; however, he left A without completing antibiotics. Antibiotic course during admission: vancomycin (11/24-present), ceftriaxone (11/24-11/25), cefepime (11/25-present).    Currently hemodynamically stable. Afebrile. WBC 13.8  Postop pain improving  ID and neurosurgery consulted, appreciate recommendations  Currently on cefepime in setting of bacteremia, plan for at least 6 weeks of therapy. High risk of elopement. Hesitant to place PICC. Plan for discharge to LTACH to complete abx.   If IV cefepime is not feasible, then will need to do 6 weeks of PO cipro  End date 1/8/24  Needs weekly CBC, BMP, ESR, CRP  ESR, CRP ordered 12/2/2024   Needs follow up with neurosurgery 14 days postop for suture removal.      History of polysubstance use disorder   Drug screen positive for methamphetamine and fentanyl. Admits to IVDU, methamphetamines and heroin.   Addition and social work consult     Admission H and  stenosis. Mainly mild neural foramina stenosis T8-T9 down to T10-T11 due to facet arthropathy. No paravertebral soft tissue swelling. Partially imaged lungs: No focal consolidation or pleural effusion. Old granulomatous disease.     Impression: 1. No acute thoracic spinal abnormality. 2. Thoracic spondylosis. This document has been electronically signed by: Brenden Shi MD on 11/24/2024 09:39 PM All CTs at this facility use dose modulation techniques and iterative reconstructions, and/or weight-based dosing when appropriate to reduce radiation to a low as reasonably achievable.    CT ABDOMEN PELVIS W IV CONTRAST Additional Contrast? None    Result Date: 11/24/2024  CT abdomen and pelvis with contrast Indication: Back pain. History of spinal osteomyelitis. Technique: CT abdomen and pelvis with intravenous contrast. Coronal and sagittal reformations. Comparison: MRI lumbar spine 08/22/2024. CT lumbar spine 07/22/2024. CT abdomen/pelvis 9/27/2007. Findings: Partially imaged lung bases: No pleural effusion or focal consolidation. Calcified lung granulomata. Abdomen/pelvis: Calcified hepatic and splenic granulomata. Enlarged spleen up to 15.2 cm. Liver and spleen are otherwise normal. Prior cholecystectomy, new since 2007. Pancreas and adrenal glands are normal. The kidneys enhance symmetrically. No hydronephrosis bilaterally. The large and small bowel is nondilated. Cecum located in the midline, suggesting mobile cecum. Normal appendix. Moderate amount of stool in the colon No bulky abdominal or retroperitoneal lymphadenopathy. The abdominal aorta is normal in caliber. Urinary bladder is partially distended with a normal contour. Mildly enlarged prostate Fat containing umbilical and left inguinal hernias. Bones: Spondylosis posterior fusion L3-S1 along with multilevel posterior decompression. Chronic inferior L2 and superior L3 endplate irregularities with sclerosis and osteophytosis mainly new since 07/27/2024 and

## 2024-12-03 NOTE — PLAN OF CARE
Problem: Discharge Planning  Goal: Discharge to home or other facility with appropriate resources  12/2/2024 1915 by Sommer Gaston LPN  Outcome: Progressing  Flowsheets (Taken 12/2/2024 1915)  Discharge to home or other facility with appropriate resources:   Identify barriers to discharge with patient and caregiver   Arrange for needed discharge resources and transportation as appropriate     Problem: Pain  Goal: Verbalizes/displays adequate comfort level or baseline comfort level  12/2/2024 1915 by Sommer Gaston LPN  Outcome: Progressing  Flowsheets (Taken 12/2/2024 1915)  Verbalizes/displays adequate comfort level or baseline comfort level:   Encourage patient to monitor pain and request assistance   Assess pain using appropriate pain scale   Administer analgesics based on type and severity of pain and evaluate response   Implement non-pharmacological measures as appropriate and evaluate response     Problem: Safety - Adult  Goal: Free from fall injury  12/2/2024 1915 by Sommer Gaston LPN  Outcome: Progressing  Flowsheets  Taken 12/2/2024 1915 by Sommer Gaston LPN  Free From Fall Injury: Instruct family/caregiver on patient safety  Taken 12/2/2024 1659 by Erendira De Leon RN  Free From Fall Injury: Instruct family/caregiver on patient safety     Problem: ABCDS Injury Assessment  Goal: Absence of physical injury  12/2/2024 1915 by Sommer Gaston LPN  Outcome: Progressing  Flowsheets  Taken 12/2/2024 1915 by Sommer Gaston LPN  Absence of Physical Injury: Implement safety measures based on patient assessment  Taken 12/2/2024 1659 by Erendira De Leon RN  Absence of Physical Injury: Implement safety measures based on patient assessment     Problem: Risk for Elopement  Goal: Patient will not exit the unit/facility without proper excort  12/2/2024 1915 by Sommer Gaston LPN  Outcome: Progressing  Flowsheets (Taken 12/2/2024 1915)  Nursing Interventions for Elopement Risk: Assist with personal care  needs such as toileting, eating, dressing, as needed to reduce the risk of wandering     Problem: Nutrition Deficit:  Goal: Optimize nutritional status  12/2/2024 1915 by Sommer Gaston LPN  Outcome: Progressing  Flowsheets (Taken 12/2/2024 1915)  Nutrient intake appropriate for improving, restoring, or maintaining nutritional needs: Assess nutritional status and recommend course of action     Problem: Skin/Tissue Integrity - Adult  Goal: Incisions, wounds, or drain sites healing without S/S of infection  12/2/2024 1915 by Sommer Gaston LPN  Outcome: Progressing  Flowsheets  Taken 12/2/2024 1915 by Sommer Gaston LPN  Incisions, Wounds, or Drain Sites Healing Without Sign and Symptoms of Infection: ADMISSION and DAILY: Assess and document risk factors for pressure ulcer development  Taken 12/2/2024 1700 by Erendira De Leon RN  Incisions, Wounds, or Drain Sites Healing Without Sign and Symptoms of Infection: ADMISSION and DAILY: Assess and document risk factors for pressure ulcer development  Note: No new skin breakdown observed this shift.     Problem: Musculoskeletal - Adult  Goal: Return mobility to safest level of function  12/2/2024 1915 by Sommer Gaston LPN  Outcome: Progressing  Flowsheets (Taken 12/2/2024 1915)  Return Mobility to Safest Level of Function: Assess patient stability and activity tolerance for standing, transferring and ambulating with or without assistive devices     Problem: Gastrointestinal - Adult  Goal: Maintains or returns to baseline bowel function  12/2/2024 1915 by Sommer Gaston LPN  Outcome: Progressing  Flowsheets (Taken 12/2/2024 1915)  Maintains or returns to baseline bowel function: Assess bowel function     Problem: Genitourinary - Adult  Goal: Absence of urinary retention  12/2/2024 1915 by Sommer Gaston LPN  Outcome: Progressing  Flowsheets (Taken 12/2/2024 1915)  Absence of urinary retention: Assess patient’s ability to void and empty bladder     Problem:

## 2024-12-03 NOTE — PROGRESS NOTES
Report called to Jennie at [facility], transport to Room 37, 3:30pm. Pt updated.

## 2024-12-03 NOTE — PROGRESS NOTES
Hospitalist Progress Note      Patient:  Jonathan Goldsmith    Unit/Bed:7K-20/020-A  YOB: 1975  MRN: [id number]   Acct: [id number]   PCP: Lan Davenport MD  Date of Admission: 11/24/2024    Assessment/Plan:    Sepsis secondary to serratia marcescens bacteremia  1.5cm ventral spine epidural abscess  Left psoas muscle abscess  Chronic L2-L3 discitis with associated severe bilateral neural foraminal stenosis  S/p reexploration L3-S1 with fusion extension to L2. L2-3 disc space debridement (11/27/24)  Hx of multiple admission for spinal osteomyelitis (most recently 08/2024), lumbar surgery complicated by epidural abscess (2017) who presented on 11/24/2024 for intractable back pain.  Onset of symptoms 3 days prior with back pain, subjective fever, chills, \"burning\" sensation in left leg.  His most recent inpatient stay was in 08/20/2024 for osteomyelitis with discharge to [place] for extended IV antibiotics; however, he left AMA without completing antibiotics. Antibiotic course during admission: vancomycin (11/24-present), ceftriaxone (11/24-11/25), cefepime (11/25-present).    Currently hemodynamically stable. Afebrile. WBC 13.8  Postop pain improving  ID and neurosurgery consulted, appreciate recommendations  Currently on cefepime in setting of bacteremia, plan for at least 6 weeks of therapy. High risk of elopement. Hesitant to place PICC. Plan for discharge to LTACH to complete abx.   If IV cefepime is not feasible, then will need to do 6 weeks of PO cipro  End date 1/8/25  Needs weekly CBC, BMP, ESR, CRP  ESR, CRP ordered 12/2/2024   Needs follow up with neurosurgery 14 days postop for suture removal.     History of polysubstance use disorder   Drug screen positive for methamphetamine and fentanyl. Admits to IVDU, methamphetamines and heroin.   Addition and social work consult     LDA: []CVC / []PICC / []Midline / []Marquez / []Drains  osseous lesions. Spondylosis. T5 hemangioma. Chronic T7 anterior wedging deformity. Paravertebral soft tissue are normal.     Impression: No acute thoracic abnormality. This document has been electronically signed by: Brenden Shi MD on 11/24/2024 09:14 PM All CTs at this facility use dose modulation techniques and iterative reconstructions, and/or weight-based dosing when appropriate to reduce radiation to a low as reasonably achievable.      Electronically signed by Devon Saenz DO on 12/3/2024 at 7:45 AM

## 2024-12-03 NOTE — PROGRESS NOTES
[facility]  OCCUPATIONAL THERAPY MISSED TREATMENT NOTE  [facility] 7K  7K-20/020-A      Date: 12/3/2024  Patient Name: Jonathan Goldsmith        CSN: [id number]   : 1975  (49 y.o.)  Gender: male   Referring Practitioner: Roberto Michaels PA-C            REASON FOR MISSED TREATMENT:  Patient sleeping at this time, will attempt next available time.

## 2024-12-03 NOTE — CARE COORDINATION
12/3/24, 2:00 PM EST    Patient goals/plan/ treatment preferences discussed by  and .  Patient goals/plan/ treatment preferences reviewed with patient/ family.  Patient/ family verbalize understanding of discharge plan and are in agreement with goal/plan/treatment preferences.  Understanding was demonstrated using the teach back method.  AVS provided by RN at time of discharge, which includes all necessary medical information pertaining to the patients current course of illness, treatment, post-discharge goals of care, and treatment preferences. Joe Barrera today. Dr. Rodriguez to be attending. Primary RN to call report. Chart printed.     Services At/After Discharge: LTAC        Respiratory

## 2024-12-03 NOTE — PLAN OF CARE
Problem: Discharge Planning  Goal: Discharge to home or other facility with appropriate resources  12/3/2024 0426 by Marcin Aj RN  Outcome: Progressing  Flowsheets (Taken 12/3/2024 0426)  Discharge to home or other facility with appropriate resources: Identify barriers to discharge with patient and caregiver     Problem: Pain  Goal: Verbalizes/displays adequate comfort level or baseline comfort level  12/3/2024 0426 by Marcin Aj RN  Outcome: Progressing  Flowsheets (Taken 12/3/2024 0426)  Verbalizes/displays adequate comfort level or baseline comfort level:   Encourage patient to monitor pain and request assistance   Assess pain using appropriate pain scale   Administer analgesics based on type and severity of pain and evaluate response   Implement non-pharmacological measures as appropriate and evaluate response     Problem: Safety - Adult  Goal: Free from fall injury  12/3/2024 0426 by Marcin Aj RN  Outcome: Progressing  Flowsheets (Taken 12/3/2024 0426)  Free From Fall Injury: Instruct family/caregiver on patient safety     Problem: Nutrition Deficit:  Goal: Optimize nutritional status  12/3/2024 0426 by Marcin Aj RN  Outcome: Progressing  Flowsheets (Taken 12/3/2024 0426)  Nutrient intake appropriate for improving, restoring, or maintaining nutritional needs:   Monitor oral intake, labs, and treatment plans   Assess nutritional status and recommend course of action     Problem: Skin/Tissue Integrity - Adult  Goal: Incisions, wounds, or drain sites healing without S/S of infection  12/3/2024 0426 by Marcin Aj RN  Outcome: Progressing  Flowsheets (Taken 12/3/2024 0425)  Incisions, Wounds, or Drain Sites Healing Without Sign and Symptoms of Infection:   TWICE DAILY: Assess and document skin integrity   TWICE DAILY: Assess and document dressing/incision, wound bed, drain sites and surrounding tissue     Problem: Musculoskeletal - Adult  Goal: Return mobility to safest

## 2024-12-04 LAB
ALBUMIN SERPL BCG-MCNC: 3.1 G/DL (ref 3.5–5.1)
ALP SERPL-CCNC: 71 U/L (ref 38–126)
ALT SERPL W/O P-5'-P-CCNC: 12 U/L (ref 11–66)
ANION GAP SERPL CALC-SCNC: 10 MEQ/L (ref 8–16)
ANISOCYTOSIS BLD QL SMEAR: PRESENT
AST SERPL-CCNC: 12 U/L (ref 5–40)
AUTO DIFF PNL BLD: ABNORMAL
BASOPHILS ABSOLUTE: 0 THOU/MM3 (ref 0–0.1)
BASOPHILS NFR BLD AUTO: 0 %
BILIRUB SERPL-MCNC: 0.2 MG/DL (ref 0.3–1.2)
BLASTS: 0 % (ref 0–1)
BUN SERPL-MCNC: 14 MG/DL (ref 7–22)
CALCIUM SERPL-MCNC: 8.6 MG/DL (ref 8.5–10.5)
CHLORIDE SERPL-SCNC: 96 MEQ/L (ref 98–111)
CO2 SERPL-SCNC: 29 MEQ/L (ref 23–33)
CREAT SERPL-MCNC: 0.7 MG/DL (ref 0.4–1.2)
DEPRECATED RDW RBC AUTO: 41.1 FL (ref 35–45)
EOSINOPHIL NFR BLD AUTO: 7 %
EOSINOPHILS ABSOLUTE: 0.7 THOU/MM3 (ref 0–0.4)
ERYTHROCYTE [DISTWIDTH] IN BLOOD BY AUTOMATED COUNT: 13.8 % (ref 11.5–14.5)
GFR SERPL CREATININE-BSD FRML MDRD: > 90 ML/MIN/1.73M2
GLUCOSE SERPL-MCNC: 87 MG/DL (ref 70–108)
HCT VFR BLD AUTO: 34 % (ref 42–52)
HGB BLD-MCNC: 10.8 GM/DL (ref 14–18)
LYMPHOCYTES ABSOLUTE: 2.6 THOU/MM3 (ref 1–4.8)
LYMPHOCYTES NFR BLD AUTO: 25 %
MANUAL DIFF BLD: NORMAL
MCH RBC QN AUTO: 26 PG (ref 26–33)
MCHC RBC AUTO-ENTMCNC: 31.8 GM/DL (ref 32.2–35.5)
MCV RBC AUTO: 81.9 FL (ref 80–94)
METAMYELOCYTES: 2 %
MONOCYTES ABSOLUTE: 0.1 THOU/MM3 (ref 0.4–1.3)
MONOCYTES NFR BLD AUTO: 1 %
MYELOCYTES: 1 %
NEUTROPHILS ABSOLUTE: 6.7 THOU/MM3 (ref 1.8–7.7)
NEUTROPHILS NFR BLD AUTO: 64 %
NRBC BLD AUTO-RTO: 0 /100 WBC
PATHOLOGIST REVIEW: ABNORMAL
PLATELET # BLD AUTO: 424 THOU/MM3 (ref 130–400)
PLATELET BLD QL SMEAR: ABNORMAL
PMV BLD AUTO: 9 FL (ref 9.4–12.4)
POLYCHROMASIA BLD QL SMEAR: ABNORMAL
POTASSIUM SERPL-SCNC: 4.7 MEQ/L (ref 3.5–5.2)
PREALB SERPL-MCNC: 12.1 MG/DL (ref 20–40)
PROT SERPL-MCNC: 6.8 G/DL (ref 6.1–8)
RBC # BLD AUTO: 4.15 MILL/MM3 (ref 4.7–6.1)
SCAN OF BLOOD SMEAR: NORMAL
SMUDGE CELLS BLD QL SMEAR: PRESENT
SODIUM SERPL-SCNC: 135 MEQ/L (ref 135–145)
TARGETS BLD QL SMEAR: ABNORMAL
VARIANT LYMPHS BLD QL SMEAR: ABNORMAL %
WBC # BLD AUTO: 10.4 THOU/MM3 (ref 4.8–10.8)

## 2024-12-09 ENCOUNTER — APPOINTMENT (OUTPATIENT)
Dept: GENERAL RADIOLOGY | Age: 49
End: 2024-12-09
Attending: INTERNAL MEDICINE
Payer: COMMERCIAL

## 2024-12-09 PROCEDURE — 73564 X-RAY EXAM KNEE 4 OR MORE: CPT

## 2024-12-10 LAB
ANION GAP SERPL CALC-SCNC: 12 MEQ/L (ref 8–16)
BASOPHILS ABSOLUTE: 0.1 THOU/MM3 (ref 0–0.1)
BASOPHILS NFR BLD AUTO: 0.9 %
BUN SERPL-MCNC: 15 MG/DL (ref 7–22)
CALCIUM SERPL-MCNC: 8.7 MG/DL (ref 8.5–10.5)
CHLORIDE SERPL-SCNC: 100 MEQ/L (ref 98–111)
CO2 SERPL-SCNC: 24 MEQ/L (ref 23–33)
CREAT SERPL-MCNC: 0.6 MG/DL (ref 0.4–1.2)
CRP SERPL-MCNC: 1.95 MG/DL (ref 0–1)
DEPRECATED RDW RBC AUTO: 41.8 FL (ref 35–45)
EOSINOPHIL NFR BLD AUTO: 1.7 %
EOSINOPHILS ABSOLUTE: 0.1 THOU/MM3 (ref 0–0.4)
ERYTHROCYTE [DISTWIDTH] IN BLOOD BY AUTOMATED COUNT: 13.7 % (ref 11.5–14.5)
ERYTHROCYTE [SEDIMENTATION RATE] IN BLOOD BY WESTERGREN METHOD: 27 MM/HR (ref 0–10)
GFR SERPL CREATININE-BSD FRML MDRD: > 90 ML/MIN/1.73M2
GLUCOSE SERPL-MCNC: 79 MG/DL (ref 70–108)
HCT VFR BLD AUTO: 34.9 % (ref 42–52)
HGB BLD-MCNC: 10.7 GM/DL (ref 14–18)
IMM GRANULOCYTES # BLD AUTO: 0.14 THOU/MM3 (ref 0–0.07)
IMM GRANULOCYTES NFR BLD AUTO: 1.6 %
LYMPHOCYTES ABSOLUTE: 2 THOU/MM3 (ref 1–4.8)
LYMPHOCYTES NFR BLD AUTO: 22.8 %
MCH RBC QN AUTO: 25.7 PG (ref 26–33)
MCHC RBC AUTO-ENTMCNC: 30.7 GM/DL (ref 32.2–35.5)
MCV RBC AUTO: 83.7 FL (ref 80–94)
MONOCYTES ABSOLUTE: 0.9 THOU/MM3 (ref 0.4–1.3)
MONOCYTES NFR BLD AUTO: 9.9 %
NEUTROPHILS ABSOLUTE: 5.5 THOU/MM3 (ref 1.8–7.7)
NEUTROPHILS NFR BLD AUTO: 63.1 %
NRBC BLD AUTO-RTO: 0 /100 WBC
PLATELET # BLD AUTO: 372 THOU/MM3 (ref 130–400)
PMV BLD AUTO: 8.9 FL (ref 9.4–12.4)
POTASSIUM SERPL-SCNC: 4.8 MEQ/L (ref 3.5–5.2)
RBC # BLD AUTO: 4.17 MILL/MM3 (ref 4.7–6.1)
REASON FOR REJECTION: NORMAL
REJECTED TEST: NORMAL
SODIUM SERPL-SCNC: 136 MEQ/L (ref 135–145)
WBC # BLD AUTO: 8.7 THOU/MM3 (ref 4.8–10.8)

## 2024-12-12 ENCOUNTER — TELEPHONE (OUTPATIENT)
Dept: FAMILY MEDICINE CLINIC | Age: 49
End: 2024-12-12

## 2024-12-12 NOTE — TELEPHONE ENCOUNTER
Call received from Darby COX @ [facility]  Patient being discharged today s/p osteomyelitis of the spine  Requesting verbal authorization for Dr Davenport to follow for home health nursing for wound management    Advised that patient has not been seen in-office since 7/28/2017 so Dr Davenport is unable to follow until seen     Hospital follow up scheduled 12/18/24 @ 2pm    Will follow up with TCM call tomorrow

## 2024-12-13 ENCOUNTER — TELEPHONE (OUTPATIENT)
Dept: FAMILY MEDICINE CLINIC | Age: 49
End: 2024-12-13

## 2024-12-13 NOTE — TELEPHONE ENCOUNTER
Care Transitions Initial Follow Up Call    Outreach made within 2 business days of discharge: Yes    Patient: Jonathan Goldsmith Patient : [id number]   MRN: [id number]  Reason for Admission: osteomyelitis   Discharge Date: 24       Spoke with: left message with sister    Discharge department/facility: [facility]

## 2024-12-19 ENCOUNTER — TELEPHONE (OUTPATIENT)
Dept: INFECTIOUS DISEASES | Age: 49
End: 2024-12-19

## 2024-12-19 ENCOUNTER — TELEPHONE (OUTPATIENT)
Dept: NEUROSURGERY | Age: 49
End: 2024-12-19

## 2024-12-19 ENCOUNTER — OFFICE VISIT (OUTPATIENT)
Dept: NEUROSURGERY | Age: 49
End: 2024-12-19

## 2024-12-19 VITALS — HEART RATE: 93 BPM | SYSTOLIC BLOOD PRESSURE: 148 MMHG | DIASTOLIC BLOOD PRESSURE: 95 MMHG

## 2024-12-19 DIAGNOSIS — M46.46 DISCITIS OF LUMBAR REGION: Primary | ICD-10-CM

## 2024-12-19 DIAGNOSIS — Z98.1 STATUS POST LUMBAR SPINAL FUSION: ICD-10-CM

## 2024-12-19 DIAGNOSIS — M46.46 DISCITIS OF LUMBAR REGION: ICD-10-CM

## 2024-12-19 DIAGNOSIS — Z98.1 STATUS POST LUMBAR SPINAL FUSION: Primary | ICD-10-CM

## 2024-12-19 DIAGNOSIS — G06.2 EPIDURAL ABSCESS: Primary | ICD-10-CM

## 2024-12-19 PROCEDURE — 99024 POSTOP FOLLOW-UP VISIT: CPT | Performed by: NEUROLOGICAL SURGERY

## 2024-12-19 RX ORDER — OXYCODONE HYDROCHLORIDE 5 MG/1
5 TABLET ORAL EVERY 4 HOURS PRN
COMMUNITY
Start: 2024-12-12 | End: 2024-12-19 | Stop reason: SDUPTHER

## 2024-12-19 RX ORDER — CIPROFLOXACIN 500 MG/1
500 TABLET, FILM COATED ORAL 2 TIMES DAILY
Qty: 70 TABLET | Refills: 0 | Status: SHIPPED | OUTPATIENT
Start: 2024-12-19 | End: 2025-01-23

## 2024-12-19 NOTE — TELEPHONE ENCOUNTER
Call received from infectious disease stating that they are unable to get the patient into their office any earlier, but she stated that Dr. Olivares did call in an antibiotic for the patient.   Voicemail left informing patient that the antibiotic was sent into [facility] pharmacy. Office number provided for any further questions or concerns.

## 2024-12-19 NOTE — TELEPHONE ENCOUNTER
Called back [facility] talked to Sonia, told her that we could not get patient in sooner but, Dr. Olivares agreed to send in patients antibiotics.  He e-scribed them to [place] [facility], patients preferred pharmacy.  Sonia stated she would call patient and let him know that the prescription was sent in.

## 2024-12-20 DIAGNOSIS — Z98.1 STATUS POST LUMBAR SPINAL FUSION: ICD-10-CM

## 2024-12-20 RX ORDER — OXYCODONE HYDROCHLORIDE 5 MG/1
5 TABLET ORAL EVERY 4 HOURS PRN
Qty: 28 TABLET | Refills: 0 | Status: SHIPPED | OUTPATIENT
Start: 2024-12-20 | End: 2024-12-23 | Stop reason: SDUPTHER

## 2024-12-23 ENCOUNTER — TELEPHONE (OUTPATIENT)
Dept: NEUROSURGERY | Age: 49
End: 2024-12-23

## 2024-12-23 DIAGNOSIS — Z98.1 STATUS POST LUMBAR SPINAL FUSION: ICD-10-CM

## 2024-12-23 RX ORDER — OXYCODONE HYDROCHLORIDE 5 MG/1
5 TABLET ORAL EVERY 4 HOURS PRN
Qty: 28 TABLET | Refills: 0 | Status: CANCELLED | OUTPATIENT
Start: 2024-12-23 | End: 2024-12-30

## 2024-12-23 RX ORDER — OXYCODONE HYDROCHLORIDE 5 MG/1
5 TABLET ORAL EVERY 4 HOURS PRN
Qty: 28 TABLET | Refills: 0 | Status: SHIPPED | OUTPATIENT
Start: 2024-12-23 | End: 2024-12-30

## 2024-12-23 NOTE — TELEPHONE ENCOUNTER
Patient called stating he is having some drainage from his wound. And Needs a refill of his pain medication. A script was sent in on 12/19 to [facility] pharmacy. Pt called to have the prescription sent to [facility] in [place]. The order was canceled at [facility] pharmacy and rerouted back to Roberto Michaels for authorization. Patient called stating he has called the [facility] pharmacy and they don't have anything ready for  for the pt. Writer encouraged pt to go to ED if pain gets unbearable or if drainage from wound continues. Pt stated he doesn't think the wound is open, just draining. Pt verbalizes understanding. KL

## 2024-12-30 ENCOUNTER — TELEPHONE (OUTPATIENT)
Dept: INFECTIOUS DISEASES | Age: 49
End: 2024-12-30

## 2024-12-30 LAB
FUNGUS SPEC CULT: NORMAL
FUNGUS SPEC CULT: NORMAL
FUNGUS SPEC FUNGUS STN: NORMAL
FUNGUS SPEC FUNGUS STN: NORMAL

## 2024-12-30 NOTE — PROGRESS NOTES
[facility] [place] [facility]  [facility]  [address]  [facility] [address]  Dept: [phone]  Dept Fax: [phone]  Loc: [phone]    Post Op Follow Visit  Visit Date: 12/19/2024      Jonathan  is a 49 y.o. male who is returning to the office today for a post-op follow-up visit. He had surgery on 11/27/2024  ( L2-Pelvis plus L2-L3 disc space debridement)      Patient was evaluated today and is doing adequately overall.  Still complaining from severe back pain no new complaints were voiced.  Patient  lives with their family  Wound: drainage: has some drainage from some area after suture removal (patient stated he that he did not take his antibiotics for the last 5 days).     Assessment/Plan:  Status Post 2-Pelvis plus L2-L3 disc space debridement as a treatment for infected heart where and L2-L3 discitis  Doing adequately overall  Encouraged gradual increase in physical and mental activity.  Follow-up with infectious disease as scheduled.  Advice regarding the importance in follow-up with the recommendation infectious disease recommendation and antibiotic was given to the patient  Fall precaution and home safety education provided to patient.  Follow-up: 1 to 2 weeks      Electronically signed by Deb Doherty MD on 12/30/24 at 12:24 PM EST

## 2025-01-02 ENCOUNTER — TELEPHONE (OUTPATIENT)
Dept: NEUROSURGERY | Age: 50
End: 2025-01-02

## 2025-01-02 NOTE — TELEPHONE ENCOUNTER
Patient called regarding his missed appt with ID and asking if he was able to get a refill of his pain medication as he is almost out and is in a lot of pain since his fall last week. Pt stated he missed his appt with ID because he was sick in bed with pneumonia and is just now able to talk. Writer encouraged pt to go to ED if the pain is increasing since fall. Writer placed pt on hold and attempted to call ID to set up a new appt for pt. Writer left  for ID office to call pt or to call our office for any other questions. Writer then spoke back with pt . Pt is encouraged to go to ED and reach out to ID for follow up appt. MARIELLE

## (undated) DEVICE — DISPOSABLE STANDARD BIPOLAR FORCEPS, NON-STICK,: Brand: SPETZLER-MALIS

## (undated) DEVICE — BIPOLAR SEALER 23-112-1 AQM 6.0: Brand: AQUAMANTYS ®

## (undated) DEVICE — DRAIN SURG 10FR PVC TB W/ TRCR MID PERF NO RESVR HUBLESS

## (undated) DEVICE — PACK-MAJOR

## (undated) DEVICE — GLOVE SURG SZ 85 L12IN THK75MIL DK GRN LTX FREE

## (undated) DEVICE — SUTURE VICRYL + ABSRB L18IN 0 NDL L36MM OS6 VLT 1/2 CIR REV CUT

## (undated) DEVICE — C-ARMOR C-ARM EQUIPMENT COVERS CLEAR STERILE UNIVERSAL FIT 12 PER CASE: Brand: C-ARMOR

## (undated) DEVICE — 7" HEIGHT PRONE HEADREST. WITH COMFORT FOAM AND RIGHT SIDE INTUBATION SLOT: Brand: SOULE MEDICAL

## (undated) DEVICE — PENCIL SMK EVAC ALL IN 1 DSGN ENH VISIBILITY IMPROVED AIR

## (undated) DEVICE — BAND RUBBER ST

## (undated) DEVICE — CONTAINER,SPECIMEN,PNEU TUBE,4OZ,OR STRL: Brand: MEDLINE

## (undated) DEVICE — DR SALMA'S SPINE: Brand: MEDLINE INDUSTRIES, INC.

## (undated) DEVICE — 24FR BIPLR COAG ELECTRDE, STERILE: Brand: N.A.

## (undated) DEVICE — SYRINGE MED 30ML STD CLR PLAS LUERLOCK TIP N CTRL DISP

## (undated) DEVICE — SYRINGE IRRIG 60ML SFT PLIABLE BLB EZ TO GRP 1 HND USE W/

## (undated) DEVICE — COVER,MAYO STAND,STERILE: Brand: MEDLINE

## (undated) DEVICE — TOOL MR8-14MH30 MR8 14CM MATCH 3MM: Brand: MIDAS REX MR8

## (undated) DEVICE — SPK10281 JACKSON TABLE KIT: Brand: SPK10281 JACKSON TABLE KIT

## (undated) DEVICE — FISH HOOKS, W/SUTURE, RUBBER BAND BLUNT, BARBLESS: Brand: DEROYAL

## (undated) DEVICE — DRAPE MICSCP W132XL406CM LENS DIA68MM W VARI LENS2 FOR LEICA

## (undated) DEVICE — HYDROGEN PEROXIDE 3% 8OZ

## (undated) DEVICE — SUTURE ETHILON SZ 3-0 L18IN NONABSORBABLE BLK L24MM FS-1 3/8 663G

## (undated) DEVICE — GLOVE ORANGE PI 8 1/2   MSG9085

## (undated) DEVICE — AGENT HEMOSTATIC SURGIFLOW MATRIX KIT W/THROMBIN

## (undated) DEVICE — OIL CARTRIDGE: Brand: CORE, MAESTRO

## (undated) DEVICE — PROBE STIM 3 MM FOR PEDCL SCREW DISP

## (undated) DEVICE — 450 ML BOTTLE OF 0.05% CHLORHEXIDINE GLUCONATE IN 99.95% STERILE WATER FOR IRRIGATION, USP AND APPLICATOR.: Brand: IRRISEPT ANTIMICROBIAL WOUND LAVAGE

## (undated) DEVICE — NEEDLE SPNL L3.5IN PNK HUB S STL REG WALL FIT STYL W/ QNCKE

## (undated) DEVICE — DRESSING HYDROFIBER AQUACEL AG ADVANTAGE 3.5X12 IN

## (undated) DEVICE — SOLUTION IRRIG 3000ML 0.9% SOD CHL USP UROMATIC PLAS CONT

## (undated) DEVICE — Device

## (undated) DEVICE — CARBIDE MATCH HEAD

## (undated) DEVICE — SOLUTION PREP PAINT POV IOD FOR SKIN MUCOUS MEM

## (undated) DEVICE — SOLUTION SCRB 4OZ 7.5% POVIDONE IOD ANTIMIC BTL

## (undated) DEVICE — BLADE ES L4IN INSUL EDGE

## (undated) DEVICE — GLOVE ORANGE PI 8   MSG9080

## (undated) DEVICE — DRAPE MICROSCOPE 54 IN X 120 IN

## (undated) DEVICE — GAUZE,SPONGE,8"X4",12PLY,XRAY,STRL,LF: Brand: MEDLINE

## (undated) DEVICE — BAG,BANDED,W/RUBBERBAND,STERILE,30X36: Brand: MEDLINE

## (undated) DEVICE — STERILE-Z SURGICAL PATIENT COVERS CLEAR POLYETHYLENE STERILE UNIVERSAL FIT 10 PER CASE: Brand: STERILE-Z

## (undated) DEVICE — MARKER SURG PASS SPHR NDI

## (undated) DEVICE — CATHETER IV 14GA L1.75IN OD2.146MM ID1.740MM ORNG VIALON

## (undated) DEVICE — TUBING, SUCTION, 1/4" X 20', STRAIGHT: Brand: MEDLINE INDUSTRIES, INC.

## (undated) DEVICE — KIT EVAC 400CC PVC RADPQ Y CONN

## (undated) DEVICE — DIFFUSER: Brand: CORE, MAESTRO

## (undated) DEVICE — SUTURE PERMA-HAND SZ 2-0 L30IN NONABSORBABLE BLK L26MM SH K833H